# Patient Record
Sex: MALE | Race: WHITE | NOT HISPANIC OR LATINO | Employment: OTHER | URBAN - METROPOLITAN AREA
[De-identification: names, ages, dates, MRNs, and addresses within clinical notes are randomized per-mention and may not be internally consistent; named-entity substitution may affect disease eponyms.]

---

## 2017-01-12 ENCOUNTER — OFFICE VISIT (OUTPATIENT)
Dept: INTERNAL MEDICINE | Facility: CLINIC | Age: 82
End: 2017-01-12

## 2017-01-12 DIAGNOSIS — E78.00 PURE HYPERCHOLESTEROLEMIA: ICD-10-CM

## 2017-01-12 DIAGNOSIS — G47.00 PERSISTENT INSOMNIA: ICD-10-CM

## 2017-01-12 DIAGNOSIS — I10 ESSENTIAL HYPERTENSION: Primary | ICD-10-CM

## 2017-01-12 DIAGNOSIS — M79.10 MYALGIA: ICD-10-CM

## 2017-01-12 DIAGNOSIS — E66.9 ADIPOSITY: ICD-10-CM

## 2017-01-12 LAB
ALBUMIN SERPL-MCNC: 4.1 G/DL (ref 3.2–4.8)
ALBUMIN/GLOB SERPL: 1.6 G/DL (ref 1.5–2.5)
ALP SERPL-CCNC: 65 U/L (ref 25–100)
ALT SERPL W P-5'-P-CCNC: 26 U/L (ref 7–40)
ANION GAP SERPL CALCULATED.3IONS-SCNC: 11 MMOL/L (ref 3–11)
ARTICHOKE IGE QN: 56 MG/DL (ref 0–130)
AST SERPL-CCNC: 29 U/L (ref 0–33)
BASOPHILS # BLD AUTO: 0.02 10*3/MM3 (ref 0–0.2)
BASOPHILS NFR BLD AUTO: 0.3 % (ref 0–1)
BILIRUB SERPL-MCNC: 1 MG/DL (ref 0.3–1.2)
BUN BLD-MCNC: 15 MG/DL (ref 9–23)
BUN/CREAT SERPL: 13.6 (ref 7–25)
CALCIUM SPEC-SCNC: 9.7 MG/DL (ref 8.7–10.4)
CHLORIDE SERPL-SCNC: 106 MMOL/L (ref 99–109)
CHOLEST SERPL-MCNC: 132 MG/DL (ref 0–200)
CO2 SERPL-SCNC: 28 MMOL/L (ref 20–31)
CREAT BLD-MCNC: 1.1 MG/DL (ref 0.6–1.3)
CRP SERPL-MCNC: 5.5 MG/DL (ref 0–10)
DEPRECATED RDW RBC AUTO: 44.7 FL (ref 37–54)
EOSINOPHIL # BLD AUTO: 0.29 10*3/MM3 (ref 0.1–0.3)
EOSINOPHIL NFR BLD AUTO: 4.3 % (ref 0–3)
ERYTHROCYTE [DISTWIDTH] IN BLOOD BY AUTOMATED COUNT: 12.9 % (ref 11.3–14.5)
GFR SERPL CREATININE-BSD FRML MDRD: 65 ML/MIN/1.73
GLOBULIN UR ELPH-MCNC: 2.5 GM/DL
GLUCOSE BLD-MCNC: 112 MG/DL (ref 70–100)
HCT VFR BLD AUTO: 45.2 % (ref 38.9–50.9)
HDLC SERPL-MCNC: 43 MG/DL (ref 40–60)
HGB BLD-MCNC: 15.8 G/DL (ref 13.1–17.5)
IMM GRANULOCYTES # BLD: 0.04 10*3/MM3 (ref 0–0.03)
IMM GRANULOCYTES NFR BLD: 0.6 % (ref 0–0.6)
LYMPHOCYTES # BLD AUTO: 1.66 10*3/MM3 (ref 0.6–4.8)
LYMPHOCYTES NFR BLD AUTO: 24.6 % (ref 24–44)
MCH RBC QN AUTO: 33.1 PG (ref 27–31)
MCHC RBC AUTO-ENTMCNC: 35 G/DL (ref 32–36)
MCV RBC AUTO: 94.8 FL (ref 80–99)
MONOCYTES # BLD AUTO: 0.41 10*3/MM3 (ref 0–1)
MONOCYTES NFR BLD AUTO: 6.1 % (ref 0–12)
NEUTROPHILS # BLD AUTO: 4.33 10*3/MM3 (ref 1.5–8.3)
NEUTROPHILS NFR BLD AUTO: 64.1 % (ref 41–71)
PLATELET # BLD AUTO: 148 10*3/MM3 (ref 150–450)
PMV BLD AUTO: 10.8 FL (ref 6–12)
POTASSIUM BLD-SCNC: 4.5 MMOL/L (ref 3.5–5.5)
PROT SERPL-MCNC: 6.6 G/DL (ref 5.7–8.2)
RBC # BLD AUTO: 4.77 10*6/MM3 (ref 4.2–5.76)
SODIUM BLD-SCNC: 145 MMOL/L (ref 132–146)
TRIGL SERPL-MCNC: 117 MG/DL (ref 0–150)
WBC NRBC COR # BLD: 6.75 10*3/MM3 (ref 3.5–10.8)

## 2017-01-12 PROCEDURE — 85025 COMPLETE CBC W/AUTO DIFF WBC: CPT | Performed by: INTERNAL MEDICINE

## 2017-01-12 PROCEDURE — 80053 COMPREHEN METABOLIC PANEL: CPT | Performed by: INTERNAL MEDICINE

## 2017-01-12 PROCEDURE — 99214 OFFICE O/P EST MOD 30 MIN: CPT | Performed by: INTERNAL MEDICINE

## 2017-01-12 PROCEDURE — 86140 C-REACTIVE PROTEIN: CPT | Performed by: INTERNAL MEDICINE

## 2017-01-12 PROCEDURE — 80061 LIPID PANEL: CPT | Performed by: INTERNAL MEDICINE

## 2017-01-12 RX ORDER — MAGNESIUM HYDROXIDE/ALUMINUM HYDROXICE/SIMETHICONE 120; 1200; 1200 MG/30ML; MG/30ML; MG/30ML
15 SUSPENSION ORAL DAILY PRN
COMMUNITY
End: 2017-06-08 | Stop reason: HOSPADM

## 2017-01-12 RX ORDER — CLOPIDOGREL BISULFATE 75 MG/1
75 TABLET ORAL DAILY
Qty: 90 TABLET | Refills: 1 | Status: SHIPPED | OUTPATIENT
Start: 2017-01-12 | End: 2017-11-11 | Stop reason: SDUPTHER

## 2017-01-12 NOTE — MR AVS SNAPSHOT
Iván Host   1/12/2017 7:45 AM   Office Visit    Provider:  Charles Scott MD   Department:  [facility] INTERNAL MEDICINE   Dept Phone:  [phone]                Your Full Care Plan              Today's Medication Changes          These changes are accurate as of: 1/12/17  8:20 AM.  If you have any questions, ask your nurse or doctor.               New Medication(s)Ordered:     Phentermine-Topiramate 3.75-23 MG capsule sustained-release 24 hr   Take 1 capsule by mouth Every Morning.         Medication(s)that have changed:     clopidogrel 75 MG tablet   Commonly known as:  PLAVIX   Take 1 tablet by mouth Daily.   What changed:  See the new instructions.            Where to Get Your Medications      These medications were sent to [facility]/pharmacy #[id number] - [place], [place] - [address] - [phone]  - [phone]   [address] [id number]    Hours:  24-hours Phone:  [phone]     clopidogrel 75 MG tablet         You can get these medications from any pharmacy     Bring a paper prescription for each of these medications     Phentermine-Topiramate 3.75-23 MG capsule sustained-release 24 hr                  Your Updated Medication List          This list is accurate as of: 1/12/17  8:20 AM.  Always use your most recent med list.                ADVIL 200 MG capsule   Generic drug:  Ibuprofen       aluminum-magnesium hydroxide-simethicone 200-200-20 MG/5ML suspension   Commonly known as:  MAALOX/MYLANTA       aspirin 81 MG chewable tablet   Chew 1 tablet Daily.       atenolol 50 MG tablet   Commonly known as:  TENORMIN   TAKE 1 TABLET DAILY.       bismuth subsalicylate 262 MG/15ML suspension   Commonly known as:  PEPTO BISMOL       CENTRUM ADULTS tablet       clopidogrel 75 MG tablet   Commonly known as:  PLAVIX   Take 1 tablet by mouth Daily.       Co Q-10 200 MG capsule       Glucosamine 500 MG capsule       oxybutynin XL 5 MG 24 hr tablet      Commonly known as:  DITROPAN-XL   TAKE 1 TABLET BY MOUTH EVERY 12 (TWELVE) HOURS.       Phentermine-Topiramate 3.75-23 MG capsule sustained-release 24 hr   Take 1 capsule by mouth Every Morning.       raNITIdine 300 MG tablet   Commonly known as:  ZANTAC   Take 1 tablet by mouth every night.       rosuvastatin 10 MG tablet   Commonly known as:  CRESTOR   Take 1 tablet by mouth Every Night.       tamsulosin 0.4 MG capsule 24 hr capsule   Commonly known as:  FLOMAX   TAKE 1 CAPSULE EVERY 12 HOURS       triamcinolone 0.1 % cream   Commonly known as:  KENALOG       VIAGRA 100 MG tablet   Generic drug:  sildenafil       Vitamin C 500 MG capsule       Vitamin D (Cholecalciferol) 1000 UNITS capsule       zaleplon 10 MG capsule   Commonly known as:  SONATA               We Performed the Following     C-reactive Protein     CBC & Differential     Comprehensive Metabolic Panel     Lipid Panel       You Were Diagnosed With        Codes Comments    Essential hypertension    -  Primary ICD-10-CM: I10  ICD-9-CM: 401.9     Adiposity     ICD-10-CM: E66.9  ICD-9-CM: 278.02     Pure hypercholesterolemia     ICD-10-CM: E78.00  ICD-9-CM: 272.0     Myalgia     ICD-10-CM: M79.1  ICD-9-CM: 729.1       Instructions    1.  Start Qsymia 1 capsule every morning - call the office  February 1 with status.    2.  Plan on increased dose of medicine - next month.    3.  Plan on three-day bowel prep for colonoscopy - in March.    4.  Continue usual medicines and supplements - as listed.    5.  Use Pepto-Bismol or Mylanta - for indigestion.    6.  Maintain a routine physical fitness program - every week.    7.  Follow a low-calorie diet - low in salt and low in sugar - for steady weight loss.    8.  Goal weight by December - 235 pounds.    9.  Return in April - fasting checkup.     Patient Instructions History      [facility] Sign[facility]     [facility] allows you to send messages to your doctor, view your test results, renew your  prescriptions, schedule appointments, and more. To sign up, go to [employer] and click on the Sign Up Now link in the New User? box. Enter your [employer] Activation Code exactly as it appears below along with the last four digits of your Social Security Number and your Date of Birth () to complete the sign-up process. If you do not sign up before the expiration date, you must request a new code.    [employer] Activation Code: [email or web address]  Expires: 2017  8:20 AM    If you have questions, you can email [email or web address] or call [phone] to talk to our [employer] staff. Remember, [employer] is NOT to be used for urgent needs. For medical emergencies, dial 911.               Other Info from Your Visit           Your Appointments     Mar 20, 2017  7:00 AM EDT   Outside Facility with Ryan Torres MD   [facility] GASTROENTEROLOGY (--)    [address]  [facility] [address]   [phone]            2017  7:45 AM EDT   Follow Up with Charles Scott MD   [facility] INTERNAL MEDICINE (--)    [address]  [facility] [address]   [phone]           Arrive 15 minutes prior to appointment.              Allergies     Atorvastatin Intolerance     Fatigue    Topiramate Intolerance     Leg cramps      Vital Signs     Smoking Status                   Never Smoker           Problems and Diagnoses Noted     Adiposity    High cholesterol or triglycerides    High blood pressure    Myalgia

## 2017-01-12 NOTE — PATIENT INSTRUCTIONS
1.  Start Qsymia 1 capsule every morning - call the office  February 1 with status.    2.  Plan on increased dose of medicine - next month.    3.  Plan on three-day bowel prep for colonoscopy - in March.    4.  Continue usual medicines and supplements - as listed.    5.  Use Pepto-Bismol or Mylanta - for indigestion.    6.  Maintain a routine physical fitness program - every week.    7.  Follow a low-calorie diet - low in salt and low in sugar - for steady weight loss.    8.  Goal weight by December - 235 pounds.    9.  Return in April - fasting checkup.

## 2017-01-13 VITALS
DIASTOLIC BLOOD PRESSURE: 80 MMHG | SYSTOLIC BLOOD PRESSURE: 130 MMHG | HEART RATE: 70 BPM | RESPIRATION RATE: 14 BRPM | WEIGHT: 247 LBS | TEMPERATURE: 97.8 F | OXYGEN SATURATION: 96 % | BODY MASS INDEX: 32.59 KG/M2

## 2017-01-13 RX ORDER — ROSUVASTATIN CALCIUM 10 MG/1
10 TABLET, COATED ORAL EVERY OTHER DAY
Qty: 90 TABLET | Refills: 1 | Status: SHIPPED | OUTPATIENT
Start: 2017-01-13 | End: 2017-06-08 | Stop reason: ALTCHOICE

## 2017-01-13 NOTE — PROGRESS NOTES
Siria Lara is a 81 y.o. male.     History of Present Illness     The patient is now 2 months status post TIA and brief hospitalization.  He has had no more neurologic events.  He has had no palpitations or chest pains.  He continues on aspirin and Plavix for secondary prevention.  He does have a remote history of a TIA several years ago as well as hypertension and hyperlipidemia.  He has previously failed atorvastatin but is now tolerating Crestor for the last several weeks.  He is back to her regular exercise program and  has never smoked.    The following portions of the patient's history were reviewed and updated as appropriate: allergies, current medications, past family history, past medical history, past social history, past surgical history and problem list.    Review of Systems   Constitutional: Negative for appetite change and fatigue.   HENT: Negative for ear pain and sore throat.    Respiratory: Negative for cough and shortness of breath.    Cardiovascular: Negative for chest pain and palpitations.   Gastrointestinal: Negative for abdominal pain and nausea.   Genitourinary: Positive for frequency and urgency.        Overactive bladder responds well to oxybutynin   Musculoskeletal: Positive for arthralgias. Negative for back pain and gait problem.   Neurological: Negative for dizziness and headaches.   Psychiatric/Behavioral: Negative for sleep disturbance. The patient is not nervous/anxious.         Sleep quality is good with medication ×2 months.         Objective   There were no vitals taken for this visit.    Physical Exam   Constitutional: He is oriented to person, place, and time. He appears well-developed and well-nourished. No distress.   Cardiovascular: Normal rate, regular rhythm and normal heart sounds.    Pulmonary/Chest: Effort normal and breath sounds normal. He has no wheezes. He has no rales.   Abdominal: Soft. Bowel sounds are normal. He exhibits no distension. There is no  tenderness.   Obese   Musculoskeletal: Normal range of motion. He exhibits no edema.   Neurological: He is alert and oriented to person, place, and time. He exhibits normal muscle tone. Coordination normal.   Psychiatric: He has a normal mood and affect. His behavior is normal. Judgment and thought content normal.   Nursing note and vitals reviewed.    Procedures  Assessment/Plan   Diagnoses and all orders for this visit:    Essential hypertension    Adiposity    Pure hypercholesterolemia  -     Comprehensive Metabolic Panel  -     Lipid Panel    Myalgia  -     CBC & Differential  -     C-reactive Protein  -     CK; Future  -     CBC Auto Differential    Other orders  -     Phentermine-Topiramate 3.75-23 MG capsule sustained-release 24 hr; Take 1 capsule by mouth Every Morning.  -     clopidogrel (PLAVIX) 75 MG tablet; Take 1 tablet by mouth Daily.      The patient is neurologically doing well.  I've asked him to continue on aspirin and Plavix for secondary prevention.  He must continue working on his risk factors to optimize his chances for preventing future events.    The patient's cholesterol level is quite low with an LDL of 50.  He will likely do well on Crestor every 48 hours and will also likely have less risk of failure on this medication.    The patient's obesity remains a significant risk factor.  He has made no progress in the last few years with standard care.  I've counseled him on the use of medications for appetite suppression and is agreeable.    The patient's blood pressure is in good control.  He should continue salt restriction and atenolol for a goal blood pressure of 130/80.    The patient has a colonoscopy scheduled for March.  Because of his advanced age and fragile health, I've asked him to use a three-day bowel prep to minimize chances of excessive stress and dehydration.  This will be his last screening colonoscopy.    Patient Instructions   1.  Start Qsymia 1 capsule every morning - call  the office  February 1 with status.    2.  Plan on increased dose of medicine - next month.    3.  Plan on three-day bowel prep for colonoscopy - in March.    4.  Continue usual medicines and supplements - as listed.    5.  Use Pepto-Bismol or Mylanta - for indigestion.    6.  Maintain a routine physical fitness program - every week.    7.  Follow a low-calorie diet - low in salt and low in sugar - for steady weight loss.    8.  Goal weight by December - 235 pounds.    9.  Return in April - fasting checkup.    10.  Change Crestor 10 mg even days only for long-term safety.  Repeat CMP and lipid panel in 5 weeks.    11.  Other laboratory tests are acceptable and required no change in treatment.    12.  Patient counseled on the above by telephone Friday, January 13 and understands the new plan.

## 2017-01-17 DIAGNOSIS — E66.9 ADIPOSITY: Primary | ICD-10-CM

## 2017-02-20 ENCOUNTER — OFFICE VISIT (OUTPATIENT)
Dept: INTERNAL MEDICINE | Facility: CLINIC | Age: 82
End: 2017-02-20

## 2017-02-20 DIAGNOSIS — I10 ESSENTIAL HYPERTENSION: ICD-10-CM

## 2017-02-20 DIAGNOSIS — R19.7 DIARRHEA OF PRESUMED INFECTIOUS ORIGIN: ICD-10-CM

## 2017-02-20 DIAGNOSIS — E66.9 ADIPOSITY: Primary | ICD-10-CM

## 2017-02-20 DIAGNOSIS — E78.00 PURE HYPERCHOLESTEROLEMIA: ICD-10-CM

## 2017-02-20 PROCEDURE — 99213 OFFICE O/P EST LOW 20 MIN: CPT | Performed by: INTERNAL MEDICINE

## 2017-02-20 NOTE — PATIENT INSTRUCTIONS
1.  Use Pepto-Bismol and Imodium as needed - to prevent diarrhea.    2.  Drink Powerade ZERO - 1 bottle daily to prevent dehydration - as needed.    3.  Change Crestor - every other day - even days only.    4.  Continue usual medicines and supplements - as listed.    5.  Return April 11 - fasting checkup.    6.  Bring in stool specimens - Friday or next Monday - as needed - if diarrhea does not resolve.    7.  Continue off of Qsymia because of memory impairment.

## 2017-02-20 NOTE — PROGRESS NOTES
Siria Lara is a 81 y.o. male.     History of Present Illness     The patient's had 3 days of watery diarrhea.  He has had no nausea vomiting or abdominal cramps.  He has had no fevers chills or sweats.  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  He did travel to [place] for several days returning on Sunday February 12.    The patient's had many years of mild obesity.  He was started on Qsymia once month ago for weight loss and took it for several days.  He developed a memory difficulty when speaking to his wife and talking on the phone.  He stopped the medication 3 weeks ago and feels that he was much better within 48 hours.  He still attends many business meetings.  He has continued to take Crestor every day even though he was cold a month ago to change it to 48 hours.  He just forgot to put down the note.    The following portions of the patient's history were reviewed and updated as appropriate: allergies, current medications, past family history, past medical history, past social history, past surgical history and problem list.    Review of Systems   Constitutional: Negative for appetite change and fatigue.   HENT: Negative for congestion and sore throat.    Respiratory: Negative for cough and shortness of breath.    Cardiovascular: Negative for chest pain and palpitations.   Gastrointestinal: Positive for diarrhea. Negative for abdominal distention, blood in stool and nausea.   Neurological: Negative for dizziness and light-headedness.   Psychiatric/Behavioral: Positive for decreased concentration. Negative for sleep disturbance. The patient is not nervous/anxious.        Objective   There were no vitals taken for this visit.    Physical Exam   Constitutional: He is oriented to person, place, and time. He appears well-developed and well-nourished. No distress.   HENT:   Right Ear:  External ear normal.   Left Ear: External ear normal.   Mouth/Throat: Oropharynx is clear and moist.   Eyes: EOM are normal. Pupils are equal, round, and reactive to light. No scleral icterus.   Neck: Normal range of motion. Neck supple. No JVD present.   Cardiovascular: Normal rate, regular rhythm and normal heart sounds.    Pulmonary/Chest: Effort normal and breath sounds normal. He has no wheezes. He has no rales.   Abdominal: Soft. Bowel sounds are normal. He exhibits no distension and no mass. There is no tenderness.   Lymphadenopathy:     He has no cervical adenopathy.   Neurological: He is alert and oriented to person, place, and time. He exhibits normal muscle tone. Coordination normal.   Psychiatric: He has a normal mood and affect. His behavior is normal. Judgment and thought content normal.   Nursing note and vitals reviewed.    Procedures  Assessment/Plan   Diagnoses and all orders for this visit:    Adiposity    Essential hypertension    Pure hypercholesterolemia    Diarrhea of presumed infectious origin      The patient has 3 days of diarrheal illness probably infectious in origin.  He has lost 7 pounds last month and have counseled him on rehydration.  I've asked him to treat this symptomatically and return for further studies if it is not resolved in one week.    Patient has apparently had an adverse drug reaction to a weight loss medication.  He also experienced a TIA in November which may still have unresolved impairment.     The patient has hyperlipidemia and has had a TIA.  He is previously failed Lipitor.  His LDL cholesterol was 50 on Crestor.  I've asked him to change Crestor to 48 hours schedule since it may work just as well and will have much less chance of adverse reaction.    Patient Instructions   1.  Use Pepto-Bismol and Imodium as needed - to prevent diarrhea.    2.  Drink Powerade ZERO - 1 bottle daily to prevent dehydration - as needed.    3.  Change Crestor - every other day - even  days only.    4.  Continue usual medicines and supplements - as listed.    5.  Return April 11 - fasting checkup.    6.  Bring in stool specimens - Friday or next Monday - as needed - if diarrhea does not resolve.    7.  Continue off of Qsymia because of memory impairment.    Electronically signed Charles Scott M.D.2/20/2017 8:22 AM

## 2017-03-08 ENCOUNTER — LAB REQUISITION (OUTPATIENT)
Dept: LAB | Facility: HOSPITAL | Age: 82
End: 2017-03-08

## 2017-03-08 ENCOUNTER — APPOINTMENT (OUTPATIENT)
Dept: LAB | Facility: HOSPITAL | Age: 82
End: 2017-03-08

## 2017-03-08 DIAGNOSIS — R19.7 DIARRHEA: ICD-10-CM

## 2017-03-08 DIAGNOSIS — R19.7 WATERY DIARRHEA: Primary | ICD-10-CM

## 2017-03-08 LAB — C DIFF TOX GENS STL QL NAA+PROBE: NOT DETECTED

## 2017-03-08 PROCEDURE — 87046 STOOL CULTR AEROBIC BACT EA: CPT | Performed by: INTERNAL MEDICINE

## 2017-03-08 PROCEDURE — 87209 SMEAR COMPLEX STAIN: CPT | Performed by: INTERNAL MEDICINE

## 2017-03-08 PROCEDURE — 87177 OVA AND PARASITES SMEARS: CPT | Performed by: INTERNAL MEDICINE

## 2017-03-08 PROCEDURE — 87493 C DIFF AMPLIFIED PROBE: CPT | Performed by: INTERNAL MEDICINE

## 2017-03-08 PROCEDURE — 87328 CRYPTOSPORIDIUM AG IA: CPT | Performed by: INTERNAL MEDICINE

## 2017-03-08 PROCEDURE — 87329 GIARDIA AG IA: CPT | Performed by: INTERNAL MEDICINE

## 2017-03-08 PROCEDURE — 87045 FECES CULTURE AEROBIC BACT: CPT | Performed by: INTERNAL MEDICINE

## 2017-03-09 RX ORDER — ATENOLOL 50 MG/1
TABLET ORAL
Qty: 90 TABLET | Refills: 1 | Status: SHIPPED | OUTPATIENT
Start: 2017-03-09 | End: 2017-10-02 | Stop reason: SDUPTHER

## 2017-03-10 LAB
BACTERIA SPEC AEROBE CULT: NORMAL
CRYPTOSP AG STL QL IA: NEGATIVE
G LAMBLIA AG STL QL IA: NEGATIVE

## 2017-03-12 LAB
O+P SPEC MICRO: NORMAL
O+P STL TRI STN: NORMAL

## 2017-03-14 ENCOUNTER — TELEPHONE (OUTPATIENT)
Dept: GASTROENTEROLOGY | Facility: CLINIC | Age: 82
End: 2017-03-14

## 2017-03-14 ENCOUNTER — TELEPHONE (OUTPATIENT)
Dept: INTERNAL MEDICINE | Facility: CLINIC | Age: 82
End: 2017-03-14

## 2017-03-14 NOTE — TELEPHONE ENCOUNTER
Msg left requesting pt to call back to let us know how his diarrhea is doing per TGF. If still a problem he would need to come in, may come in some day this week per TGF.
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
## 2017-03-16 ENCOUNTER — OFFICE VISIT (OUTPATIENT)
Dept: INTERNAL MEDICINE | Facility: CLINIC | Age: 82
End: 2017-03-16

## 2017-03-16 VITALS
SYSTOLIC BLOOD PRESSURE: 150 MMHG | TEMPERATURE: 97.8 F | BODY MASS INDEX: 32.06 KG/M2 | WEIGHT: 243 LBS | OXYGEN SATURATION: 97 % | HEART RATE: 72 BPM | DIASTOLIC BLOOD PRESSURE: 100 MMHG | RESPIRATION RATE: 16 BRPM

## 2017-03-16 DIAGNOSIS — R19.7 DIARRHEA OF PRESUMED INFECTIOUS ORIGIN: Primary | ICD-10-CM

## 2017-03-16 DIAGNOSIS — K21.9 GASTROESOPHAGEAL REFLUX DISEASE WITHOUT ESOPHAGITIS: ICD-10-CM

## 2017-03-16 DIAGNOSIS — I10 ESSENTIAL HYPERTENSION: ICD-10-CM

## 2017-03-16 PROCEDURE — 99213 OFFICE O/P EST LOW 20 MIN: CPT | Performed by: INTERNAL MEDICINE

## 2017-03-16 RX ORDER — LOPERAMIDE HYDROCHLORIDE 2 MG/1
2 CAPSULE ORAL 2 TIMES DAILY PRN
COMMUNITY
End: 2017-03-18

## 2017-03-16 NOTE — PROGRESS NOTES
Subjective   Iván Lara is a 81 y.o. male.     History of Present Illness     The patient presented February 20 with a 3 day history of diarrhea.  He was asked to return with stool specimens if his diarrhea continued over the next week.  About one week ago he visited a gastroenterologist the patient's had continued diarrhea and fact has had difficulty controlling his bowels.  He has not been using I Imodium prior to activities.  He has never had a severe case of diarrhea in past years.    The following portions of the patient's history were reviewed and updated as appropriate: allergies, current medications, past family history, past medical history, past social history, past surgical history and problem list.    Review of Systems   Constitutional: Negative for appetite change, chills, fatigue and fever.   Cardiovascular: Negative for chest pain and palpitations.   Gastrointestinal: Positive for diarrhea. Negative for abdominal distention, anal bleeding, nausea and vomiting.   Genitourinary: Positive for frequency and urgency.   Neurological: Negative for dizziness and light-headedness.       Objective   Blood pressure 150/100, pulse 72, temperature 97.8 °F (36.6 °C), temperature source Oral, resp. rate 16, weight 243 lb (110 kg), SpO2 97 %.    Physical Exam   Constitutional: He appears well-developed and well-nourished. No distress.   Cardiovascular: Normal rate, regular rhythm and normal heart sounds.    Pulmonary/Chest: Effort normal and breath sounds normal. He has no wheezes. He has no rales.   Abdominal: Soft. He exhibits no distension. There is no tenderness.   Bowel sounds are hyperreactive   Psychiatric: He has a normal mood and affect. His behavior is normal. Judgment and thought content normal.   Nursing note and vitals reviewed.    Procedures  Assessment/Plan   Iván was seen today for diarrhea.    Diagnoses and all orders for this visit:    Diarrhea of presumed infectious origin    Essential  hypertension    Gastroesophageal reflux disease without esophagitis      The patient's diarrhea is certainly refractory now over 3 weeks.  Stool specimens have been negative for pathogens.  He seems to have either a disturbance of his bowel ludivina was possibly developed microscopic colitis.  I've encouraged her to follow through with his colonoscopy on Monday, March 20.    The patient's diarrhea has progressed to the point of difficulty with continence.  I've counseled him on using Lomotil prior to travel and eating only small amounts.  He may wish to wear special pads in his previous to protect against episodes of incontinence.    The patient's chronic GERD could certainly be a factor in the potential for diarrhea.  He should avoid PPIs because of risks for increasing bowel motility.  He should continue on ranitidine and use Pepto-Bismol as needed.    Patient Instructions   1.  Use Lomotil 2 tablets twice daily as needed - to prevent diarrhea.    2.  Prepare bowel - follow through with colonoscopy - next week.    3.  Always eat small amounts of food - to avoid bowel stimulation.    4.  Get adequate rest at night - to avoid bowel distress.    5.  Return April 11 - fasting checkup.      Electronically signed Charles Scott M.D.3/18/2017 5:24 PM

## 2017-03-16 NOTE — PATIENT INSTRUCTIONS
1.  Use Lomotil 2 tablets twice daily as needed - to prevent diarrhea.    2.  Prepare bowel - follow through with colonoscopy - next week.    3.  Always eat small amounts of food - to avoid bowel stimulation.    4.  Get adequate rest at night - to avoid bowel distress.    5.  Return April 11 - fasting checkup.

## 2017-03-18 RX ORDER — DIPHENOXYLATE HYDROCHLORIDE AND ATROPINE SULFATE 2.5; .025 MG/1; MG/1
2 TABLET ORAL 2 TIMES DAILY PRN
COMMUNITY
End: 2017-06-08 | Stop reason: ALTCHOICE

## 2017-03-20 ENCOUNTER — OUTSIDE FACILITY SERVICE (OUTPATIENT)
Dept: GASTROENTEROLOGY | Facility: CLINIC | Age: 82
End: 2017-03-20

## 2017-03-20 ENCOUNTER — LAB REQUISITION (OUTPATIENT)
Dept: LAB | Facility: HOSPITAL | Age: 82
End: 2017-03-20

## 2017-03-20 DIAGNOSIS — R19.7 DIARRHEA: ICD-10-CM

## 2017-03-20 DIAGNOSIS — Z86.010 HISTORY OF COLONIC POLYPS: ICD-10-CM

## 2017-03-20 PROCEDURE — 88305 TISSUE EXAM BY PATHOLOGIST: CPT | Performed by: INTERNAL MEDICINE

## 2017-03-20 PROCEDURE — 45380 COLONOSCOPY AND BIOPSY: CPT | Performed by: INTERNAL MEDICINE

## 2017-03-21 LAB
CYTO UR: NORMAL
LAB AP CASE REPORT: NORMAL
LAB AP CLINICAL INFORMATION: NORMAL
Lab: NORMAL
PATH REPORT.FINAL DX SPEC: NORMAL
PATH REPORT.GROSS SPEC: NORMAL

## 2017-03-23 ENCOUNTER — DOCUMENTATION (OUTPATIENT)
Dept: INTERNAL MEDICINE | Facility: CLINIC | Age: 82
End: 2017-03-23

## 2017-03-23 NOTE — PROGRESS NOTES
Patient notified of colon biopsy report.  He has already talked to the gastroenterologist.  He has started on Entocort capsules 3 daily and already feels better after 1 day.  Return to the office in 3 weeks.

## 2017-03-24 ENCOUNTER — TELEPHONE (OUTPATIENT)
Dept: INTERNAL MEDICINE | Facility: CLINIC | Age: 82
End: 2017-03-24

## 2017-03-24 NOTE — TELEPHONE ENCOUNTER
----- Message from Gaurav Wolf sent at 3/24/2017 10:35 AM EDT -----  Contact: TIM GEIGER RENEWAL:  [facility]IS PHARM[facility] SAID TGF NEEDS TO APPROVE HIS ATENOLOL FOR RENEWAL.  [facility].  [phone]

## 2017-04-11 ENCOUNTER — OFFICE VISIT (OUTPATIENT)
Dept: INTERNAL MEDICINE | Facility: CLINIC | Age: 82
End: 2017-04-11

## 2017-04-11 DIAGNOSIS — K21.9 GASTROESOPHAGEAL REFLUX DISEASE WITHOUT ESOPHAGITIS: ICD-10-CM

## 2017-04-11 DIAGNOSIS — J06.9 ACUTE URI: Primary | ICD-10-CM

## 2017-04-11 DIAGNOSIS — K52.832 LYMPHOCYTIC COLITIS: ICD-10-CM

## 2017-04-11 DIAGNOSIS — E78.00 PURE HYPERCHOLESTEROLEMIA: ICD-10-CM

## 2017-04-11 DIAGNOSIS — I10 ESSENTIAL HYPERTENSION: ICD-10-CM

## 2017-04-11 PROBLEM — R19.7 DIARRHEA: Status: RESOLVED | Noted: 2017-02-20 | Resolved: 2017-04-11

## 2017-04-11 LAB
ALBUMIN SERPL-MCNC: 4.1 G/DL (ref 3.2–4.8)
ALBUMIN/GLOB SERPL: 1.6 G/DL (ref 1.5–2.5)
ALP SERPL-CCNC: 66 U/L (ref 25–100)
ALT SERPL W P-5'-P-CCNC: 21 U/L (ref 7–40)
ANION GAP SERPL CALCULATED.3IONS-SCNC: 0 MMOL/L (ref 3–11)
ARTICHOKE IGE QN: 78 MG/DL (ref 0–130)
AST SERPL-CCNC: 19 U/L (ref 0–33)
BASOPHILS # BLD AUTO: 0.04 10*3/MM3 (ref 0–0.2)
BASOPHILS NFR BLD AUTO: 0.4 % (ref 0–1)
BILIRUB SERPL-MCNC: 1.2 MG/DL (ref 0.3–1.2)
BUN BLD-MCNC: 19 MG/DL (ref 9–23)
BUN/CREAT SERPL: 15.8 (ref 7–25)
CALCIUM SPEC-SCNC: 9.5 MG/DL (ref 8.7–10.4)
CHLORIDE SERPL-SCNC: 106 MMOL/L (ref 99–109)
CHOLEST SERPL-MCNC: 168 MG/DL (ref 0–200)
CO2 SERPL-SCNC: 33 MMOL/L (ref 20–31)
CREAT BLD-MCNC: 1.2 MG/DL (ref 0.6–1.3)
CRP SERPL-MCNC: 0.28 MG/DL (ref 0–1)
DEPRECATED RDW RBC AUTO: 49.4 FL (ref 37–54)
EOSINOPHIL # BLD AUTO: 0.35 10*3/MM3 (ref 0.1–0.3)
EOSINOPHIL NFR BLD AUTO: 3.9 % (ref 0–3)
ERYTHROCYTE [DISTWIDTH] IN BLOOD BY AUTOMATED COUNT: 13.8 % (ref 11.3–14.5)
GFR SERPL CREATININE-BSD FRML MDRD: 58 ML/MIN/1.73
GLOBULIN UR ELPH-MCNC: 2.5 GM/DL
GLUCOSE BLD-MCNC: 106 MG/DL (ref 70–100)
HCT VFR BLD AUTO: 46.5 % (ref 38.9–50.9)
HDLC SERPL-MCNC: 55 MG/DL (ref 40–60)
HGB BLD-MCNC: 15.2 G/DL (ref 13.1–17.5)
IMM GRANULOCYTES # BLD: 0.04 10*3/MM3 (ref 0–0.03)
IMM GRANULOCYTES NFR BLD: 0.4 % (ref 0–0.6)
LYMPHOCYTES # BLD AUTO: 1.47 10*3/MM3 (ref 0.6–4.8)
LYMPHOCYTES NFR BLD AUTO: 16.4 % (ref 24–44)
MCH RBC QN AUTO: 32.2 PG (ref 27–31)
MCHC RBC AUTO-ENTMCNC: 32.7 G/DL (ref 32–36)
MCV RBC AUTO: 98.5 FL (ref 80–99)
MONOCYTES # BLD AUTO: 0.8 10*3/MM3 (ref 0–1)
MONOCYTES NFR BLD AUTO: 8.9 % (ref 0–12)
NEUTROPHILS # BLD AUTO: 6.25 10*3/MM3 (ref 1.5–8.3)
NEUTROPHILS NFR BLD AUTO: 70 % (ref 41–71)
PLATELET # BLD AUTO: 129 10*3/MM3 (ref 150–450)
PMV BLD AUTO: 11 FL (ref 6–12)
POTASSIUM BLD-SCNC: 5.4 MMOL/L (ref 3.5–5.5)
PROT SERPL-MCNC: 6.6 G/DL (ref 5.7–8.2)
RBC # BLD AUTO: 4.72 10*6/MM3 (ref 4.2–5.76)
SODIUM BLD-SCNC: 139 MMOL/L (ref 132–146)
TRIGL SERPL-MCNC: 120 MG/DL (ref 0–150)
WBC NRBC COR # BLD: 8.95 10*3/MM3 (ref 3.5–10.8)

## 2017-04-11 PROCEDURE — 99213 OFFICE O/P EST LOW 20 MIN: CPT | Performed by: INTERNAL MEDICINE

## 2017-04-11 PROCEDURE — G0438 PPPS, INITIAL VISIT: HCPCS | Performed by: INTERNAL MEDICINE

## 2017-04-11 PROCEDURE — 80061 LIPID PANEL: CPT | Performed by: INTERNAL MEDICINE

## 2017-04-11 PROCEDURE — 86140 C-REACTIVE PROTEIN: CPT | Performed by: INTERNAL MEDICINE

## 2017-04-11 PROCEDURE — 80053 COMPREHEN METABOLIC PANEL: CPT | Performed by: INTERNAL MEDICINE

## 2017-04-11 PROCEDURE — 85025 COMPLETE CBC W/AUTO DIFF WBC: CPT | Performed by: INTERNAL MEDICINE

## 2017-04-11 PROCEDURE — 36415 COLL VENOUS BLD VENIPUNCTURE: CPT | Performed by: INTERNAL MEDICINE

## 2017-04-11 RX ORDER — BUDESONIDE 3 MG/1
1 CAPSULE, COATED PELLETS ORAL DAILY
Refills: 11 | COMMUNITY
Start: 2017-03-20 | End: 2020-07-17

## 2017-04-11 RX ORDER — OXYBUTYNIN CHLORIDE 5 MG/1
5 TABLET, EXTENDED RELEASE ORAL EVERY 12 HOURS
Qty: 180 TABLET | Refills: 1 | Status: SHIPPED | OUTPATIENT
Start: 2017-04-11

## 2017-04-11 NOTE — PROGRESS NOTES
QUICK REFERENCE INFORMATION:  The ABCs of the Annual Wellness Visit    Initial Medicare Wellness Visit    HEALTH RISK ASSESSMENT    1935    Recent Hospitalizations:  No recent hospitalization(s)..        Current Medical Providers:  Patient Care Team:  Charles Scott MD as PCP - General  Charles Scott MD as PCP - Family Medicine  Charles Scott MD as PCP - Claims Attributed - PLEASE DO NOT REMOVE  Charles Scott MD as MSSP ACO Attributed - PLEASE DO NOT REMOVE        Smoking Status:  History   Smoking Status   • Never Smoker   Smokeless Tobacco   • Never Used       Alcohol Consumption:  History   Alcohol Use   • 4.2 - 6.0 oz/week   • 7 - 10 Glasses of wine per week       Depression Screen:   PHQ-9 Depression Screening 10/7/2016   Little interest or pleasure in doing things 0   Feeling down, depressed, or hopeless 0   Trouble falling or staying asleep, or sleeping too much 0   Feeling tired or having little energy 0   Poor appetite or overeating 0   Feeling bad about yourself - or that you are a failure or have let yourself or your family down 0   Trouble concentrating on things, such as reading the newspaper or watching television 0   Moving or speaking so slowly that other people could have noticed. Or the opposite - being so fidgety or restless that you have been moving around a lot more than usual 0   Thoughts that you would be better off dead, or of hurting yourself in some way 0   PHQ-9 Total Score 0   If you checked off any problems, how difficult have these problems made it for you to do your work, take care of things at home, or get along with other people? Not difficult at all       Health Habits and Functional and Cognitive Screening:  No flowsheet data found.               Does the patient have evidence of cognitive impairment? No    Asprin use counseling:yes      Recent Lab Results:    Visual Acuity:  No exam data present    Age-appropriate Screening Schedule:  Refer to the list  below for future screening recommendations based on patient's age, sex and/or medical conditions. Orders for these recommended tests are listed in the plan section. The patient has been provided with a written plan.    Health Maintenance   Topic Date Due   • PNEUMOCOCCAL VACCINES (65+ LOW/MEDIUM RISK) (2 of 2 - PPSV23) 01/01/2009   • ZOSTER VACCINE  04/28/2016   • LIPID PANEL  01/12/2018   • TDAP/TD VACCINES (2 - Td) 01/01/2020   • INFLUENZA VACCINE  Completed        Subjective   History of Present Illness    Alin Lara is a 81 y.o. male who presents for an Annual Wellness Visit.    The following portions of the patient's history were reviewed and updated as appropriate: allergies and current medications.    Outpatient Medications Prior to Visit   Medication Sig Dispense Refill   • aluminum-magnesium hydroxide-simethicone (MAALOX/MYLANTA) 200-200-20 MG/5ML suspension Take 15 mL by mouth Daily As Needed.     • Ascorbic Acid (VITAMIN C) 500 MG capsule Take 1 capsule by mouth daily.     • aspirin 81 MG chewable tablet Chew 1 tablet Daily.     • atenolol (TENORMIN) 50 MG tablet TAKE 1 TABLET DAILY. 90 tablet 1   • bismuth subsalicylate (PEPTO BISMOL) 262 MG/15ML suspension Take 15 mL by mouth Daily As Needed. indigestion     • clopidogrel (PLAVIX) 75 MG tablet Take 1 tablet by mouth Daily. 90 tablet 1   • Coenzyme Q10 (CO Q-10) 200 MG capsule Take 1 capsule by mouth 3 (Three) Times a Day With Meals.     • diphenoxylate-atropine (LOMOTIL) 2.5-0.025 MG per tablet Take 2 tablets by mouth 2 (Two) Times a Day As Needed.     • Glucosamine 500 MG capsule Take 1 capsule by mouth daily.     • Ibuprofen (ADVIL) 200 MG capsule Take 2 capsules by mouth Daily As Needed. pain     • Multiple Vitamins-Minerals (CENTRUM ADULTS) tablet Take 1-2 tablets by mouth daily.     • ranitidine (ZANTAC) 300 MG tablet Take 1 tablet by mouth every night. 90 tablet 1   • rosuvastatin (CRESTOR) 10 MG tablet Take 1 tablet by mouth Every Other  Day. Even days only 90 tablet 1   • sildenafil (VIAGRA) 100 MG tablet Take 0.5-1 tablets by mouth as needed.     • tamsulosin (FLOMAX) 0.4 MG capsule 24 hr capsule TAKE 1 CAPSULE EVERY 12 HOURS 180 capsule 1   • triamcinolone (KENALOG) 0.1 % cream Apply  topically. Apply to itchy rash twice daily as needed     • Vitamin D, Cholecalciferol, 1000 UNITS capsule Take 3 capsules by mouth daily.     • zaleplon (SONATA) 10 MG capsule Take 0.5-1 capsules by mouth at night as needed.     • oxybutynin XL (DITROPAN-XL) 5 MG 24 hr tablet TAKE 1 TABLET BY MOUTH EVERY 12 (TWELVE) HOURS. 180 tablet 1     No facility-administered medications prior to visit.        Patient Active Problem List   Diagnosis   • Paroxysmal atrial fibrillation   • Basal cell carcinoma of skin   • Impotence of organic origin   • Gastroesophageal reflux disease   • Hyperlipidemia   • Hypertension   • Persistent insomnia   • Knee pain   • Osteoarthritis of lumbar spine   • Adiposity   • Overactive bladder   • Arthralgia of hip   • Impaired glucose tolerance   • Disorder of prostate   • Pulmonary embolism   • Inflammation of sacroiliac joint   • Generalized osteoarthritis   • Preventative health care   • Vertigo   • TIA (transient ischemic attack)   • Lymphocytic colitis       Advanced Care Planning:  has an advanced directive - a copy HAS NOT been provided. Have asked the patient to send this to us to add to record    Identification of Risk Factors:  Risk factors include: weight , cardiovascular risk, alcohol use and increased fall risk.    Review of Systems    Compared to one year ago, the patient feels his physical health is the same.  Compared to one year ago, the patient feels his mental health is the same.    Objective     Physical Exam    There were no vitals filed for this visit.    There is no height or weight on file to calculate BMI.  Discussed the patient's BMI with him. The BMI is above average; BMI management plan is  completed.    Assessment/Plan   Patient Self-Management and Personalized Health Advice  The patient has been provided with information about: diet, exercise, prevention of cardiac or vascular disease, alcohol use and supplements and preventive services including:   · Counseling for cardiovascular disease risk reduction, Exercise counseling provided, Fall Risk assessment done, Nutrition counseling provided, Urinary Incontinence assessment done.    Visit Diagnoses:    ICD-10-CM ICD-9-CM   1. Acute URI J06.9 465.9   2. Gastroesophageal reflux disease without esophagitis K21.9 530.81   3. Lymphocytic colitis K52.832 558.9   4. Essential hypertension I10 401.9   5. Pure hypercholesterolemia E78.00 272.0       Orders Placed This Encounter   Procedures   • Comprehensive Metabolic Panel   • C-reactive Protein   • Lipid Panel       Outpatient Encounter Prescriptions as of 4/11/2017   Medication Sig Dispense Refill   • aluminum-magnesium hydroxide-simethicone (MAALOX/MYLANTA) 200-200-20 MG/5ML suspension Take 15 mL by mouth Daily As Needed.     • Ascorbic Acid (VITAMIN C) 500 MG capsule Take 1 capsule by mouth daily.     • aspirin 81 MG chewable tablet Chew 1 tablet Daily.     • atenolol (TENORMIN) 50 MG tablet TAKE 1 TABLET DAILY. 90 tablet 1   • bismuth subsalicylate (PEPTO BISMOL) 262 MG/15ML suspension Take 15 mL by mouth Daily As Needed. indigestion     • Budesonide (ENTOCORT EC) 3 MG 24 hr capsule Take 3 capsules by mouth Daily.  11   • clopidogrel (PLAVIX) 75 MG tablet Take 1 tablet by mouth Daily. 90 tablet 1   • Coenzyme Q10 (CO Q-10) 200 MG capsule Take 1 capsule by mouth 3 (Three) Times a Day With Meals.     • diphenoxylate-atropine (LOMOTIL) 2.5-0.025 MG per tablet Take 2 tablets by mouth 2 (Two) Times a Day As Needed.     • Glucosamine 500 MG capsule Take 1 capsule by mouth daily.     • Ibuprofen (ADVIL) 200 MG capsule Take 2 capsules by mouth Daily As Needed. pain     • Multiple Vitamins-Minerals (CENTRUM  ADULTS) tablet Take 1-2 tablets by mouth daily.     • oxybutynin XL (DITROPAN-XL) 5 MG 24 hr tablet Take 1 tablet by mouth Every 12 (Twelve) Hours. 180 tablet 1   • ranitidine (ZANTAC) 300 MG tablet Take 1 tablet by mouth every night. 90 tablet 1   • rosuvastatin (CRESTOR) 10 MG tablet Take 1 tablet by mouth Every Other Day. Even days only 90 tablet 1   • sildenafil (VIAGRA) 100 MG tablet Take 0.5-1 tablets by mouth as needed.     • tamsulosin (FLOMAX) 0.4 MG capsule 24 hr capsule TAKE 1 CAPSULE EVERY 12 HOURS 180 capsule 1   • triamcinolone (KENALOG) 0.1 % cream Apply  topically. Apply to itchy rash twice daily as needed     • Vitamin D, Cholecalciferol, 1000 UNITS capsule Take 3 capsules by mouth daily.     • zaleplon (SONATA) 10 MG capsule Take 0.5-1 capsules by mouth at night as needed.     • [DISCONTINUED] oxybutynin XL (DITROPAN-XL) 5 MG 24 hr tablet TAKE 1 TABLET BY MOUTH EVERY 12 (TWELVE) HOURS. 180 tablet 1     No facility-administered encounter medications on file as of 4/11/2017.        Reviewed use of high risk medication in the elderly: yes  Reviewed for potential of harmful drug interactions in the elderly: yes    Follow Up:  Return in about 3 months (around 7/11/2017) for Annual.     An After Visit Summary and PPPS with all of these plans were given to the patient.     The wellness exam has been reviewed in detail.  The patient has been fully counseled on preventative guidelines for vaccines, cancer screenings, and other health maintenance needs.  Functional testing has been performed to assess capacity for independent living and need for other medical interventions.   The patient was counseled on maintaining a lifestyle to promote good health and to minimize chronic diseases.  The patient has been assisted with scheduling healthcare procedures for the coming year and given a written document outlining these recommendations.    Electronically signed Charles Scott M.D.4/12/2017 2:32 PM

## 2017-04-11 NOTE — PATIENT INSTRUCTIONS
1.  Take plain Mucinex - every 12 hours for 10 days.    2.  Call this coming Friday or Monday as needed - for an antibiotic.    3.  Continue usual medicines and supplements - as listed.    4.  Continue a well-balanced diet - low in salt and low in sugar.    5.  Return in mid July - annual checkup fasting.

## 2017-04-12 VITALS
BODY MASS INDEX: 32.34 KG/M2 | SYSTOLIC BLOOD PRESSURE: 120 MMHG | WEIGHT: 244 LBS | DIASTOLIC BLOOD PRESSURE: 80 MMHG | OXYGEN SATURATION: 98 % | TEMPERATURE: 99.1 F | RESPIRATION RATE: 14 BRPM | HEART RATE: 50 BPM | HEIGHT: 73 IN

## 2017-04-12 NOTE — PROGRESS NOTES
"Siria Lara is a 81 y.o. male.     History of Present Illness     The patient's had 3 days of cough congestion and drainage.  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  He has had no fever sore throat or body aches.    The patient's had recent protracted diarrhea.  Colonoscopy last month showed microscopic colitis.  He is been on budesonide capsules last 2 weeks and his bowels have greatly normalized.    The following portions of the patient's history were reviewed and updated as appropriate: allergies, current medications, past family history, past medical history, past social history, past surgical history and problem list.    Review of Systems   Constitutional: Negative for appetite change and fatigue.   Respiratory: Negative for shortness of breath and wheezing.    Cardiovascular: Negative for chest pain and palpitations.   Gastrointestinal: Positive for diarrhea. Negative for abdominal distention and nausea.        Diarrhea much improved on Entocort  Indigestion stable on ranitidine   Genitourinary: Positive for frequency and urgency.        Urgency and frequency improved with oxybutynin   Musculoskeletal: Positive for arthralgias. Negative for back pain and gait problem.        Occasionally takes ibuprofen for joint aches not daily   Neurological: Negative for dizziness and light-headedness.   Psychiatric/Behavioral: Positive for sleep disturbance. The patient is nervous/anxious.         Sleep quality good without medication       Objective   Blood pressure 120/80, pulse 50, temperature 99.1 °F (37.3 °C), resp. rate 14, height 73\" (185.4 cm), weight 244 lb (111 kg), SpO2 98 %.    Physical Exam   Constitutional: He is oriented to person, place, and time. He appears well-developed and well-nourished.   Cardiovascular: Normal rate, regular rhythm and normal heart sounds.    Pulmonary/Chest: Effort normal and breath sounds normal. He has no wheezes. He has no " rales.   Abdominal: Soft. Bowel sounds are normal. He exhibits no distension. There is no tenderness.   Obese   Musculoskeletal: Normal range of motion. He exhibits no edema or tenderness.   Neurological: He is alert and oriented to person, place, and time. He exhibits normal muscle tone. Coordination normal.   Psychiatric: He has a normal mood and affect. His behavior is normal. Judgment and thought content normal.   Nursing note and vitals reviewed.    Procedures  Assessment/Plan   Iván was seen today for annual exam and hyperlipidemia.    Diagnoses and all orders for this visit:    Acute URI  -     CBC & Differential  -     CBC Auto Differential    Gastroesophageal reflux disease without esophagitis    Lymphocytic colitis  -     C-reactive Protein    Essential hypertension    Pure hypercholesterolemia  -     Comprehensive Metabolic Panel  -     Lipid Panel    Other orders  -     oxybutynin XL (DITROPAN-XL) 5 MG 24 hr tablet; Take 1 tablet by mouth Every 12 (Twelve) Hours.      The patient has an acute bronchitis likely viral.  He will likely clear with Mucinex and time.  He may need a Z-Eric over the next week if this does not resolve.    Patient's microscopic colitis is in remission now on Entocort capsules.  He will hopefully come down on dosing over the next year.    The patient's status post 2 TIA's and is now doing well on aspirin and Plavix and Crestor.  His LDL at 78 is acceptable target.  He does have hyperglycemia and will need monitoring for diabetes.    The wellness exam has been reviewed in detail.  The patient has been fully counseled on preventative guidelines for vaccines, cancer screenings, and other health maintenance needs.  Functional testing has been performed to assess capacity for independent living and need for other medical interventions.   The patient was counseled on maintaining a lifestyle to promote good health and to minimize chronic diseases.  The patient has been assisted with  scheduling healthcare procedures for the coming year and given a written document outlining these recommendations.    Patient Instructions   1.  Take plain Mucinex - every 12 hours for 10 days.    2.  Call this coming Friday or Monday as needed - for an antibiotic.    3.  Continue usual medicines and supplements - as listed.    4.  Continue a well-balanced diet - low in salt and low in sugar.    5.  Return in mid July - annual checkup fasting.    6.  Blood glucose elevated at 106.  Continue low-calorie diabetic diet to prevent diabetes.    7.  Other laboratory tests are acceptable and require no change in treatment.    Electronically signed Charles Scott M.D.4/12/2017 2:48 PM

## 2017-04-14 DIAGNOSIS — J20.8 ACUTE BRONCHITIS DUE TO OTHER SPECIFIED ORGANISMS: Primary | ICD-10-CM

## 2017-04-14 RX ORDER — AZITHROMYCIN 250 MG/1
TABLET, FILM COATED ORAL
Qty: 6 TABLET | Refills: 0 | Status: SHIPPED | OUTPATIENT
Start: 2017-04-14 | End: 2017-04-17 | Stop reason: SDUPTHER

## 2017-04-14 NOTE — PROGRESS NOTES
The patient has called this morning complaining of persistent productive cough.  He was up twice last night clearing his chest.  He has no headache fever or sore throat.  His bowel movements are formed.  He is taking Tessalon with some relief.  He is taking Mucinex twice daily.  Start Z-Eric today.  Patient warned that he may have looser bowels on the Z-Eric.  Return to the office on Tuesday or Wednesday next week and let us the cough and congestion are fully recovered.

## 2017-04-17 ENCOUNTER — TELEPHONE (OUTPATIENT)
Dept: INTERNAL MEDICINE | Facility: CLINIC | Age: 82
End: 2017-04-17

## 2017-04-17 RX ORDER — AZITHROMYCIN 250 MG/1
250 TABLET, FILM COATED ORAL DAILY
Qty: 6 TABLET | Refills: 0 | Status: SHIPPED | OUTPATIENT
Start: 2017-04-17 | End: 2017-06-08 | Stop reason: ALTCHOICE

## 2017-04-17 NOTE — TELEPHONE ENCOUNTER
Called and notified pt labs acceptable except BG up at 106, continue low-calorie diabetic diet to prevent diabetes per TGF. Pt states better since started zpak (has last dose tomorrow) but still has deep chest cough with little bit of phlegm. He's stopped mucinex when started zpack bc he thought he was suppose to. Per TGF continue zpak for another 6 days, continue plain mucinex bid, come in for OX Thurs or Fri if not totally better.

## 2017-05-09 ENCOUNTER — TELEPHONE (OUTPATIENT)
Dept: INTERNAL MEDICINE | Facility: CLINIC | Age: 82
End: 2017-05-09

## 2017-05-17 ENCOUNTER — OUTSIDE FACILITY SERVICE (OUTPATIENT)
Dept: GASTROENTEROLOGY | Facility: CLINIC | Age: 82
End: 2017-05-17

## 2017-05-17 PROCEDURE — 43249 ESOPH EGD DILATION <30 MM: CPT | Performed by: INTERNAL MEDICINE

## 2017-06-08 ENCOUNTER — OFFICE VISIT (OUTPATIENT)
Dept: GASTROENTEROLOGY | Facility: CLINIC | Age: 82
End: 2017-06-08

## 2017-06-08 VITALS
HEART RATE: 45 BPM | WEIGHT: 244.4 LBS | DIASTOLIC BLOOD PRESSURE: 88 MMHG | HEIGHT: 73 IN | SYSTOLIC BLOOD PRESSURE: 142 MMHG | BODY MASS INDEX: 32.39 KG/M2 | TEMPERATURE: 97 F | OXYGEN SATURATION: 96 %

## 2017-06-08 DIAGNOSIS — K52.832 LYMPHOCYTIC COLITIS: Primary | ICD-10-CM

## 2017-06-08 DIAGNOSIS — K22.2 ESOPHAGEAL STRICTURE: ICD-10-CM

## 2017-06-08 DIAGNOSIS — K21.00 GASTROESOPHAGEAL REFLUX DISEASE WITH ESOPHAGITIS: ICD-10-CM

## 2017-06-08 PROCEDURE — 99213 OFFICE O/P EST LOW 20 MIN: CPT | Performed by: INTERNAL MEDICINE

## 2017-06-08 NOTE — PROGRESS NOTES
[facility]   History & Physical    Chief Complaint   Patient presents with   • Follow-up     EGD         Subjective       HPI  Here for follow up of microscopic colitis.  He has tapered his budesonide to 3 mg per day with excellent results. His swallowing is fine since his esophageal dilatation.  He has no symptoms of gerd and so not on PPI treatment.       Past Medical History:   Diagnosis Date   • Basal cell carcinoma    • Blood loss     post op   • HNP (herniated nucleus pulposus), lumbar ,     L5 L6   • Hyperglycemia    • Hyperlipidemia    • Hypertension    • OAB (overactive bladder)    • Obesity    • Paroxysmal atrial fibrillation     one episode - cardioverted   • Pulmonary embolism     after injury and protracted immobilization   • Right knee DJD     Partial knee arthroplasty   • Shoulder dislocation     Repeated episodes   • Sleep disturbances     Recurrent use of medication   • Transient cerebral ischemia ,     Negative medical workup on both occasions         Family History   Problem Relation Age of Onset   • Dementia Mother       age 94   • Other Father       age 86 of unknown cause   • Coronary artery disease Brother    • No Known Problems Brother    • Sick sinus syndrome Other      Has pacemaker   • Diabetes Paternal Grandmother           reports that he has never smoked. He has never used smokeless tobacco. He reports that he drinks alcohol. He reports that he does not use illicit drugs.        Current Outpatient Prescriptions:   •  Ascorbic Acid (VITAMIN C) 500 MG capsule, Take 1 capsule by mouth daily., Disp: , Rfl:   •  aspirin 81 MG chewable tablet, Chew 1 tablet Daily., Disp: , Rfl:   •  atenolol (TENORMIN) 50 MG tablet, TAKE 1 TABLET DAILY., Disp: 90 tablet, Rfl: 1  •  Budesonide (ENTOCORT EC) 3 MG 24 hr capsule, Take 2 capsules by mouth Daily., Disp: , Rfl: 11  •  clopidogrel (PLAVIX) 75 MG tablet, Take 1 tablet by mouth  "Daily., Disp: 90 tablet, Rfl: 1  •  Multiple Vitamins-Minerals (CENTRUM ADULTS) tablet, Take 1-2 tablets by mouth daily., Disp: , Rfl:   •  oxybutynin XL (DITROPAN-XL) 5 MG 24 hr tablet, Take 1 tablet by mouth Every 12 (Twelve) Hours., Disp: 180 tablet, Rfl: 1  •  ranitidine (ZANTAC) 300 MG tablet, Take 1 tablet by mouth every night., Disp: 90 tablet, Rfl: 1  •  tamsulosin (FLOMAX) 0.4 MG capsule 24 hr capsule, TAKE 1 CAPSULE EVERY 12 HOURS, Disp: 180 capsule, Rfl: 1  •  triamcinolone (KENALOG) 0.1 % cream, Apply  topically. Apply to itchy rash twice daily as needed, Disp: , Rfl:   •  Vitamin D, Cholecalciferol, 1000 UNITS capsule, Take 3 capsules by mouth daily., Disp: , Rfl:   •  sildenafil (VIAGRA) 100 MG tablet, Take 0.5-1 tablets by mouth as needed., Disp: , Rfl:     Allergies:  Atorvastatin; Qsymia [phentermine-topiramate]; and Topiramate    ROS:    Review of Systems   Constitution: Negative.   HENT: Negative.    Eyes: Negative.    Cardiovascular: Negative.    Respiratory: Negative.    Endocrine: Negative.    Hematologic/Lymphatic: Negative.    Skin: Negative.    Musculoskeletal: Negative.    Gastrointestinal: Negative.    Genitourinary: Negative.    Neurological: Negative.    Psychiatric/Behavioral: Negative.    Allergic/Immunologic: Negative.        Objective     Blood pressure 142/88, pulse (!) 45, temperature 97 °F (36.1 °C), temperature source Temporal Artery , height 73\" (185.4 cm), weight 244 lb 6.4 oz (111 kg), SpO2 96 %.    Physical Exam   Constitutional: Pt is oriented to person, place, and time and well-developed, well-nourished, and in no distress.   HENT:   Mouth/Throat: Oropharynx is clear and moist.   Neck: Normal range of motion. Neck supple.   Cardiovascular: Normal rate, regular rhythm and normal heart sounds.    Pulmonary/Chest: Effort normal and breath sounds normal. No respiratory distress. No  wheezes.   Abdominal: Soft. Bowel sounds are normal.  Soft, non-tender, normal bowel sounds; no " bruits, organomegaly or masses.  Skin: Skin is warm and dry.   Psychiatric: Mood, memory, affect and judgment normal.     Assessment/Plan   Overall he is fine at present.  He will come if he has recurrence of dysphagia.  He needs a repeat colonoscopy in March 2018 for repeat biopsies and see if healed and can stop his entercort.     Diagnosis:  Iván was seen today for follow-up.    Diagnoses and all orders for this visit:    Lymphocytic colitis    Gastroesophageal reflux disease with esophagitis    Esophageal stricture        Anticipated Surgical Procedure:    The risks, benefits, and alternatives of this procedure have been discussed with the patient or the responsible party- the patient understands and agrees to proceed.

## 2017-06-12 RX ORDER — TAMSULOSIN HYDROCHLORIDE 0.4 MG/1
CAPSULE ORAL
Qty: 180 CAPSULE | Refills: 1 | Status: SHIPPED | OUTPATIENT
Start: 2017-06-12 | End: 2017-12-30 | Stop reason: SDUPTHER

## 2017-07-20 ENCOUNTER — OFFICE VISIT (OUTPATIENT)
Dept: INTERNAL MEDICINE | Facility: CLINIC | Age: 82
End: 2017-07-20

## 2017-07-20 DIAGNOSIS — E78.00 PURE HYPERCHOLESTEROLEMIA: ICD-10-CM

## 2017-07-20 DIAGNOSIS — M15.9 GENERALIZED OSTEOARTHRITIS: ICD-10-CM

## 2017-07-20 DIAGNOSIS — R73.02 IMPAIRED GLUCOSE TOLERANCE: ICD-10-CM

## 2017-07-20 DIAGNOSIS — M79.10 MYALGIA: ICD-10-CM

## 2017-07-20 DIAGNOSIS — K52.832 LYMPHOCYTIC COLITIS: ICD-10-CM

## 2017-07-20 DIAGNOSIS — K21.9 GASTROESOPHAGEAL REFLUX DISEASE WITHOUT ESOPHAGITIS: ICD-10-CM

## 2017-07-20 DIAGNOSIS — I10 ESSENTIAL HYPERTENSION: ICD-10-CM

## 2017-07-20 DIAGNOSIS — E66.9 ADIPOSITY: ICD-10-CM

## 2017-07-20 DIAGNOSIS — N32.81 OVERACTIVE BLADDER: ICD-10-CM

## 2017-07-20 DIAGNOSIS — G47.00 PERSISTENT INSOMNIA: ICD-10-CM

## 2017-07-20 DIAGNOSIS — N42.9 DISORDER OF PROSTATE: Primary | ICD-10-CM

## 2017-07-20 DIAGNOSIS — I48.0 PAROXYSMAL ATRIAL FIBRILLATION (HCC): ICD-10-CM

## 2017-07-20 DIAGNOSIS — N40.0 PROSTATISM: ICD-10-CM

## 2017-07-20 DIAGNOSIS — R35.1 NOCTURIA: ICD-10-CM

## 2017-07-20 DIAGNOSIS — G45.1 HEMISPHERIC CAROTID ARTERY SYNDROME: ICD-10-CM

## 2017-07-20 DIAGNOSIS — G89.29 CHRONIC PAIN OF RIGHT KNEE: ICD-10-CM

## 2017-07-20 DIAGNOSIS — M25.561 CHRONIC PAIN OF RIGHT KNEE: ICD-10-CM

## 2017-07-20 LAB
ALBUMIN SERPL-MCNC: 4.2 G/DL (ref 3.2–4.8)
ALBUMIN/GLOB SERPL: 1.8 G/DL (ref 1.5–2.5)
ALP SERPL-CCNC: 66 U/L (ref 25–100)
ALT SERPL W P-5'-P-CCNC: 17 U/L (ref 7–40)
ANION GAP SERPL CALCULATED.3IONS-SCNC: 6 MMOL/L (ref 3–11)
ARTICHOKE IGE QN: 138 MG/DL (ref 0–130)
AST SERPL-CCNC: 18 U/L (ref 0–33)
BASOPHILS # BLD AUTO: 0.04 10*3/MM3 (ref 0–0.2)
BASOPHILS NFR BLD AUTO: 0.5 % (ref 0–1)
BILIRUB SERPL-MCNC: 0.9 MG/DL (ref 0.3–1.2)
BUN BLD-MCNC: 21 MG/DL (ref 9–23)
BUN/CREAT SERPL: 21 (ref 7–25)
CALCIUM SPEC-SCNC: 9.5 MG/DL (ref 8.7–10.4)
CHLORIDE SERPL-SCNC: 105 MMOL/L (ref 99–109)
CHOLEST SERPL-MCNC: 220 MG/DL (ref 0–200)
CK SERPL-CCNC: 42 U/L (ref 26–174)
CO2 SERPL-SCNC: 28 MMOL/L (ref 20–31)
CREAT BLD-MCNC: 1 MG/DL (ref 0.6–1.3)
CRP SERPL-MCNC: 0.44 MG/DL (ref 0–1)
DEPRECATED RDW RBC AUTO: 48.5 FL (ref 37–54)
EOSINOPHIL # BLD AUTO: 0.28 10*3/MM3 (ref 0–0.3)
EOSINOPHIL NFR BLD AUTO: 3.7 % (ref 0–3)
ERYTHROCYTE [DISTWIDTH] IN BLOOD BY AUTOMATED COUNT: 13.5 % (ref 11.3–14.5)
GFR SERPL CREATININE-BSD FRML MDRD: 72 ML/MIN/1.73
GLOBULIN UR ELPH-MCNC: 2.4 GM/DL
GLUCOSE BLD-MCNC: 111 MG/DL (ref 70–100)
HBA1C MFR BLD: 5.2 % (ref 4.8–5.6)
HCT VFR BLD AUTO: 46.6 % (ref 38.9–50.9)
HDLC SERPL-MCNC: 43 MG/DL (ref 40–60)
HGB BLD-MCNC: 15.5 G/DL (ref 13.1–17.5)
IMM GRANULOCYTES # BLD: 0.04 10*3/MM3 (ref 0–0.03)
IMM GRANULOCYTES NFR BLD: 0.5 % (ref 0–0.6)
LYMPHOCYTES # BLD AUTO: 1.88 10*3/MM3 (ref 0.6–4.8)
LYMPHOCYTES NFR BLD AUTO: 24.6 % (ref 24–44)
MCH RBC QN AUTO: 33 PG (ref 27–31)
MCHC RBC AUTO-ENTMCNC: 33.3 G/DL (ref 32–36)
MCV RBC AUTO: 99.1 FL (ref 80–99)
MONOCYTES # BLD AUTO: 0.54 10*3/MM3 (ref 0–1)
MONOCYTES NFR BLD AUTO: 7.1 % (ref 0–12)
NEUTROPHILS # BLD AUTO: 4.85 10*3/MM3 (ref 1.5–8.3)
NEUTROPHILS NFR BLD AUTO: 63.6 % (ref 41–71)
PLATELET # BLD AUTO: 132 10*3/MM3 (ref 150–450)
PMV BLD AUTO: 11.2 FL (ref 6–12)
POTASSIUM BLD-SCNC: 5.3 MMOL/L (ref 3.5–5.5)
PROT SERPL-MCNC: 6.6 G/DL (ref 5.7–8.2)
PSA SERPL-MCNC: 0.68 NG/ML (ref 0–4)
RBC # BLD AUTO: 4.7 10*6/MM3 (ref 4.2–5.76)
SODIUM BLD-SCNC: 139 MMOL/L (ref 132–146)
TRIGL SERPL-MCNC: 157 MG/DL (ref 0–150)
TSH SERPL DL<=0.05 MIU/L-ACNC: 2.46 MIU/ML (ref 0.35–5.35)
WBC NRBC COR # BLD: 7.63 10*3/MM3 (ref 3.5–10.8)

## 2017-07-20 PROCEDURE — 83036 HEMOGLOBIN GLYCOSYLATED A1C: CPT | Performed by: INTERNAL MEDICINE

## 2017-07-20 PROCEDURE — 36415 COLL VENOUS BLD VENIPUNCTURE: CPT | Performed by: INTERNAL MEDICINE

## 2017-07-20 PROCEDURE — 99215 OFFICE O/P EST HI 40 MIN: CPT | Performed by: INTERNAL MEDICINE

## 2017-07-20 PROCEDURE — 86140 C-REACTIVE PROTEIN: CPT | Performed by: INTERNAL MEDICINE

## 2017-07-20 PROCEDURE — 85025 COMPLETE CBC W/AUTO DIFF WBC: CPT | Performed by: INTERNAL MEDICINE

## 2017-07-20 PROCEDURE — 82550 ASSAY OF CK (CPK): CPT | Performed by: INTERNAL MEDICINE

## 2017-07-20 PROCEDURE — 80061 LIPID PANEL: CPT | Performed by: INTERNAL MEDICINE

## 2017-07-20 PROCEDURE — 84443 ASSAY THYROID STIM HORMONE: CPT | Performed by: INTERNAL MEDICINE

## 2017-07-20 PROCEDURE — 84153 ASSAY OF PSA TOTAL: CPT | Performed by: INTERNAL MEDICINE

## 2017-07-20 PROCEDURE — 80053 COMPREHEN METABOLIC PANEL: CPT | Performed by: INTERNAL MEDICINE

## 2017-07-20 PROCEDURE — 93000 ELECTROCARDIOGRAM COMPLETE: CPT | Performed by: INTERNAL MEDICINE

## 2017-07-20 RX ORDER — ZALEPLON 10 MG/1
1 CAPSULE ORAL NIGHTLY PRN
COMMUNITY
End: 2017-11-21

## 2017-07-20 RX ORDER — RANITIDINE 150 MG/1
300 TABLET ORAL NIGHTLY
Qty: 90 TABLET | Refills: 3 | Status: SHIPPED | OUTPATIENT
Start: 2017-07-20 | End: 2020-07-17

## 2017-07-20 NOTE — PROGRESS NOTES
Siria Lara is a 81 y.o. male.     Chief Complaint   Patient presents with   • Arthritis       History of Present Illness     The patient underwent partial right knee arthroplasty 2 years ago for urgent disease and refractory pain.  He has had a difficult recovery and is worked with Dr. Rosenberg for maintaining fitness and pain control.  He is starting to have more pain and occasional lives with his leg.  The patient is traveling extensively and often has to curtail activities because of his knee.  He is taking all analgesic medications.  Does have generalized osteoarthritis and is been extensively athletic person through his lifetime.  He has had moderate lumbar spondylosis and undergone 3 surgical operations.    The following portions of the patient's history were reviewed and updated as appropriate: allergies, current medications, past family history, past medical history, past social history, past surgical history and problem list.    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   • Gastroesophageal reflux disease 05/02/2016   • Hyperlipidemia 05/02/2016   • Hypertension 05/02/2016   • Persistent insomnia 05/02/2016   • Knee pain 05/02/2016   • Osteoarthritis of lumbar spine 05/02/2016   • Adiposity 05/02/2016   • Overactive bladder 05/02/2016   • Arthralgia of hip 05/02/2016   • Impaired glucose tolerance 05/02/2016   • Prostatism 05/02/2016   • Pulmonary embolism 05/02/2016   • Inflammation of sacroiliac joint 05/02/2016   • Generalized osteoarthritis 07/19/2016   • Preventative health care 07/19/2016   • Vertigo 10/07/2016   • TIA (transient ischemic attack) 10/08/2016   • Lymphocytic colitis 04/11/2017   • Esophageal stricture 06/08/2017     Resolved Ambulatory Problems     Diagnosis Date Noted   • Acute bronchitis 04/28/2016   • Diarrhea 02/20/2017     Past  Medical History:   Diagnosis Date   • Basal cell carcinoma    • Blood loss    • ED (erectile dysfunction)    • Fasting hypoglycemia    • Generalized osteoarthritis    • GERD (gastroesophageal reflux disease)    • HNP (herniated nucleus pulposus), lumbar ,    • Hyperglycemia    • Hyperlipidemia    • Hypertension    • Microscopic colitis    • OAB (overactive bladder)    • Obesity    • Paroxysmal atrial fibrillation    • Prostatism    • Pulmonary embolism    • Right knee DJD    • Shoulder dislocation    • Sleep disturbances    • Transient cerebral ischemia ,    • Vertigo      Past Surgical History:   Procedure Laterality Date   • CARDIOVERSION      Atrial fibrillation   • KNEE ARTHROPLASTY Right 2015    Partial    • KNEE ARTHROSCOPY Right 2015    Failed procedure   • LUMBAR DISC SURGERY  2014    Surgery ×2 Ruptured disc   • LUMBAR DISCECTOMY      L5 L6      Family History   Problem Relation Age of Onset   • Dementia Mother       age 94   • Other Father       age 86 of unknown cause   • Coronary artery disease Brother    • No Known Problems Brother    • Sick sinus syndrome Other      Has pacemaker   • Diabetes Paternal Grandmother      Social History     Social History   • Marital status:      Spouse name: N/A   • Number of children: N/A   • Years of education: N/A     Occupational History   • Businessman      Social History Main Topics   • Smoking status: Never Smoker   • Smokeless tobacco: Never Used   • Alcohol use Yes      Comment: Occasionally   • Drug use: No   • Sexual activity: Yes     Partners: Female      Comment: Monogamous     Other Topics Concern   • Not on file     Social History Narrative    Domestic life   lives in private home with wife        [place]   Confucianist        Sleep hygiene  chronic insomnia  -  Has used sonata in recent years - 2017  sleeps without medicine.        Caffeine use  2 or 3 cups of coffee daily        Exercise  habits   works with a         Diet     low-calorie diabetic diet low in salt and low in sugar.          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[place]        Hearing   minimal hearing impairment        Vision   corrects with lenses        Driving    no limitations             Review of Systems   Constitutional: Negative for appetite change and fatigue.   HENT: Negative for ear pain and sore throat.    Eyes: Negative for itching and visual disturbance.   Respiratory: Negative for cough and shortness of breath.    Cardiovascular: Negative for chest pain and palpitations.   Gastrointestinal: Positive for constipation. Negative for abdominal pain and nausea.        Diarrhea resolved on Entocort   Endocrine: Negative for cold intolerance and heat intolerance.   Genitourinary: Positive for frequency and urgency. Negative for dysuria and hematuria.        Nocturia stable at 2 or 3   Musculoskeletal: Positive for arthralgias. Negative for back pain and gait problem.        Moderate right knee pain   Skin: Negative for rash and wound.   Allergic/Immunologic: Negative for environmental allergies and food allergies.   Neurological: Negative for dizziness and headaches.   Hematological: Negative for adenopathy. Does not bruise/bleed easily.   Psychiatric/Behavioral: Positive for sleep disturbance. The patient is not nervous/anxious.         Uses zolpidem once or twice monthly       Objective   There were no vitals taken for this visit.    Physical Exam   Constitutional: He is oriented to person, place, and time. He appears well-developed and well-nourished. No distress.   HENT:   Right Ear: External ear normal.   Left Ear: External ear normal.   Nose: Nose normal.   Mouth/Throat: Oropharynx is clear and moist.   Eyes: EOM are normal. Pupils are equal, round, and reactive to light. No scleral icterus.   Neck: Neck supple. No JVD present.   50% range of motion   Cardiovascular: Normal rate, regular  rhythm, normal heart sounds and intact distal pulses.    No murmur heard.  Pulmonary/Chest: Effort normal and breath sounds normal. He has no wheezes. He has no rales.   Abdominal: Soft. Bowel sounds are normal. He exhibits no distension and no mass. There is no tenderness. No hernia.   Obese   Genitourinary: Rectum normal and penis normal.   Genitourinary Comments: Testicles normal.  Prostate not palpable due to sphincter tightness.   Musculoskeletal: Normal range of motion. He exhibits no edema.   Moderate tenderness medial collateral ligament right knee   Lymphadenopathy:     He has no cervical adenopathy.   Neurological: He is alert and oriented to person, place, and time. He displays normal reflexes. No cranial nerve deficit. He exhibits normal muscle tone. Coordination normal.   Vibratory normal  Romberg negative  Gait normal  Plantars downgoing     Skin: Skin is warm and dry. No rash noted.   Moderate sun damage on scalp   Psychiatric: He has a normal mood and affect. His behavior is normal. Judgment and thought content normal.   Nursing note and vitals reviewed.      ECG 12 Lead  Date/Time: 7/20/2017 8:28 AM  Performed by: CHARLES SCOTT  Authorized by: CHARLES SCOTT   Interpreted by ED physician: Charles Scott M.D.  Comparison: compared with previous ECG from 10/7/2016  Similar to previous ECG  Rhythm: sinus rhythm  Rate: bradycardic  BPM: 48  Conduction: 1st degree  ST Segments: ST segments normal  T Waves: T waves normal  QRS axis: normal  Other findings: LAE  Clinical impression: abnormal ECG  Comments: Indication - bradycardia  Baseline EKG          Assessment/Plan   Iván was seen today for arthritis.    Diagnoses and all orders for this visit:    Disorder of prostate  -     Cancel: PSA  -     PSA    Generalized osteoarthritis    Overactive bladder  -     Cancel: CBC & Differential  -     Cancel: Urinalysis With / Culture If Indicated  -     Urinalysis With / Culture If  Indicated    Chronic pain of right knee  -     Cancel: C-reactive Protein  -     Ambulatory Referral to Physical Therapy Evaluate and treat, Ortho    Persistent insomnia    Impaired glucose tolerance  -     Cancel: Hemoglobin A1c  -     Hemoglobin A1c    Gastroesophageal reflux disease without esophagitis    Adiposity    Lymphocytic colitis  -     CBC & Differential  -     C-reactive Protein  -     CBC Auto Differential    Pure hypercholesterolemia  -     Cancel: Comprehensive Metabolic Panel  -     Cancel: Lipid Panel  -     Comprehensive Metabolic Panel  -     Lipid Panel    Essential hypertension    Hemispheric carotid artery syndrome    Paroxysmal atrial fibrillation  -     ECG 12 Lead  -     Holter Monitor - 24 Hour  -     Cancel: TSH  -     TSH    Myalgia  -     CK    Nocturia   -     CBC & Differential  -     CBC Auto Differential    Prostatism    Other orders  -     raNITIdine (ZANTAC) 150 MG tablet; Take 2 tablets by mouth Every Night.      The patient's had a severe degree of pain in his right knee limiting activities.  I've asked him to work with the physical therapist again to rebuild knee fitness and strength.  He may need a neuro orthopedist since Dr. Rosenberg has moved.    The patient has prediabetes with a blood glucose at 111 fasting.  He does have diabetes in a grandmother.  Hemoglobin A1c remains normal.  He should follow a diabetic diet and have a goal weight loss of 10 pounds in the next year.      He may be a candidate for Belviq.  Intense work with the dietitian will be his best opportunity for progress.  Evaluation has obesity with a BMI of 32.  Weight loss will help blood sugars, cardiovascular risk, and arthritic pain.    The patient has hyperlipidemia with an LDL of 138.  His brother did develop coronary artery disease.  The patient has previously failed Lipitor with refractory fatigue.  We will try him on low-dose pravastatin with CoQ10 and monitor his progress.     The patient has a  "history of atrial fibrillation dating back to 1991.  Remarkably he has had little evidence of atrial fibrillation since.  His Holter monitor remains completely normal now.  He should continue on atenolol to regulate heart rate.    The patient has substantial prostatism and overactive bladder.  The combination of Flomax and oxybutynin have worked well.  He does limit fluid drinking and urinates on his due to check timing.    The patient's had many years of refractory insomnia.  In recent years he has required so not on a nightly basis.  He is doing remarkably better taking Sonata about once weekly.    She has a history of recurring GERD symptoms and one episode of esophageal stricture.  He has good symptomatic control on ranitidine.    Patient Instructions   1.  Physical therapy - rehabilitation right knee pain.    2.  Limit walking - 3 days weekly.  Use stationary cycle or water aerobics - 3 days weekly.    3.  Follow a low-calorie diet - low in salt and low in sugar.    4.  Check blood pressure once monthly - goal blood pressure 130/80.    5.  Prevnar 13 vaccine - [facility] pharmacy.    6.  Continue usual medicines and supplements - as listed.    7.  Return Holter monitored - by noon tomorrow.    8.  Return visit November - fasting checkup and wellness guidelines.    9.  Fasting hyperglycemia at 111.  Hemoglobin A1c normal.  Continue diabetic diet.    10.  LDL cholesterol elevated 138.  Start pravastatin 20 mg even days only.  Start CoQ10 200 mg daily.  Repeat lipid panel in one month.    11.  Consider work with registered dietitian and wife at \"[employer]\" for diabetic diet.    12.  Other laboratory tests are acceptable and require no change in treatment.    13.  Monitor indicates normal heart rhythm.    Current Outpatient Prescriptions:   •  Coenzyme Q10 200 MG capsule, Take 1 capsule by mouth Daily., Disp: , Rfl:   •  pravastatin (PRAVACHOL) 20 MG tablet, Take 1 tablet by mouth Every Other Day. Even days only, Disp: " , Rfl:   •  zaleplon (SONATA) 10 MG capsule, Take 1 capsule by mouth At Night As Needed., Disp: , Rfl:   •  Ascorbic Acid (VITAMIN C) 500 MG capsule, Take 1 capsule by mouth daily., Disp: , Rfl:   •  aspirin 81 MG chewable tablet, Chew 1 tablet Daily., Disp: , Rfl:   •  atenolol (TENORMIN) 50 MG tablet, TAKE 1 TABLET DAILY., Disp: 90 tablet, Rfl: 1  •  Budesonide (ENTOCORT EC) 3 MG 24 hr capsule, Take 2 capsules by mouth Daily., Disp: , Rfl: 11  •  clopidogrel (PLAVIX) 75 MG tablet, Take 1 tablet by mouth Daily., Disp: 90 tablet, Rfl: 1  •  Multiple Vitamins-Minerals (CENTRUM ADULTS) tablet, Take 1-2 tablets by mouth daily., Disp: , Rfl:   •  oxybutynin XL (DITROPAN-XL) 5 MG 24 hr tablet, Take 1 tablet by mouth Every 12 (Twelve) Hours., Disp: 180 tablet, Rfl: 1  •  raNITIdine (ZANTAC) 150 MG tablet, Take 2 tablets by mouth Every Night., Disp: 90 tablet, Rfl: 3  •  sildenafil (VIAGRA) 100 MG tablet, Take 0.5-1 tablets by mouth as needed., Disp: , Rfl:   •  tamsulosin (FLOMAX) 0.4 MG capsule 24 hr capsule, TAKE 1 CAPSULE EVERY 12 HOURS, Disp: 180 capsule, Rfl: 1  •  triamcinolone (KENALOG) 0.1 % cream, Apply  topically. Apply to itchy rash twice daily as needed, Disp: , Rfl:   •  Vitamin D, Cholecalciferol, 1000 UNITS capsule, Take 3 capsules by mouth daily., Disp: , Rfl:     Allergies   Allergen Reactions   • Atorvastatin      Fatigue   • Qsymia [Phentermine-Topiramate] Mental Status Change   • Topiramate Myalgia       Immunization History   Administered Date(s) Administered   • Influenza Split High Dose Preservative Free IM 10/01/2016   • Influenza, Quadrivalent 10/01/2015   • Pneumococcal Conjugate 01/01/2008   • Tdap 01/01/2010   • Zoster 01/01/2016     Electronically signed Charles Scott M.D.7/22/2017 2:57 PM

## 2017-07-20 NOTE — PATIENT INSTRUCTIONS
1.  Physical therapy - rehabilitation right knee pain.    2.  Limit walking - 3 days weekly.  Use stationary cycle or water aerobics - 3 days weekly.    3.  Follow a low-calorie diet - low in salt and low in sugar.    4.  Check blood pressure once monthly - goal blood pressure 130/80.    5.  Prevnar 13 vaccine - [facility] pharmacy.    6.  Continue usual medicines and supplements - as listed.    7.  Return Holter monitored - by noon tomorrow.    8.  Return visit November - fasting checkup and wellness guidelines.

## 2017-07-21 PROCEDURE — 93224 XTRNL ECG REC UP TO 48 HRS: CPT | Performed by: INTERNAL MEDICINE

## 2017-07-22 RX ORDER — UBIDECARENONE 200 MG
1 CAPSULE ORAL DAILY
COMMUNITY
End: 2017-07-26 | Stop reason: SDUPTHER

## 2017-07-22 RX ORDER — PRAVASTATIN SODIUM 20 MG
1 TABLET ORAL EVERY OTHER DAY
COMMUNITY
End: 2017-07-26 | Stop reason: SDUPTHER

## 2017-07-25 ENCOUNTER — HOSPITAL ENCOUNTER (OUTPATIENT)
Dept: PHYSICAL THERAPY | Facility: HOSPITAL | Age: 82
Setting detail: THERAPIES SERIES
Discharge: HOME | End: 2017-07-25

## 2017-07-25 DIAGNOSIS — G89.29 CHRONIC PAIN OF RIGHT KNEE: Primary | ICD-10-CM

## 2017-07-25 DIAGNOSIS — M25.561 CHRONIC PAIN OF RIGHT KNEE: Primary | ICD-10-CM

## 2017-07-25 PROCEDURE — G8979 MOBILITY GOAL STATUS: HCPCS

## 2017-07-25 PROCEDURE — 97162 PT EVAL MOD COMPLEX 30 MIN: CPT

## 2017-07-25 PROCEDURE — 97110 THERAPEUTIC EXERCISES: CPT

## 2017-07-25 PROCEDURE — G8978 MOBILITY CURRENT STATUS: HCPCS

## 2017-07-25 NOTE — THERAPY EVALUATION
Outpatient Physical Therapy Ortho Initial Evaluation   Linda     Patient Name: Iván Lara  : 1935  MRN: [id number]  Today's Date: 2017      Visit Date: 2017    Patient Active Problem List   Diagnosis   • Paroxysmal atrial fibrillation   • Basal cell carcinoma of skin   • Impotence of organic origin   • Gastroesophageal reflux disease   • Hyperlipidemia   • Hypertension   • Persistent insomnia   • Knee pain   • Osteoarthritis of lumbar spine   • Adiposity   • Overactive bladder   • Arthralgia of hip   • Impaired glucose tolerance   • Prostatism   • Pulmonary embolism   • Inflammation of sacroiliac joint   • Generalized osteoarthritis   • Preventative health care   • Vertigo   • TIA (transient ischemic attack)   • Lymphocytic colitis   • Esophageal stricture        Past Medical History:   Diagnosis Date   • Basal cell carcinoma    • Blood loss     post op   • ED (erectile dysfunction)     Responsive to Viagra   • Fasting hypoglycemia     Hemoglobin A1c normal   • Generalized osteoarthritis    • GERD (gastroesophageal reflux disease)     History esophageal stricture   • HNP (herniated nucleus pulposus), lumbar ,     L5 L6   • Hyperglycemia    • Hyperlipidemia    • Hypertension    • Microscopic colitis     Positive biopsy - colonoscopy   • OAB (overactive bladder) 2000    Persistent urgency   • Obesity    • Paroxysmal atrial fibrillation     one episode - cardioverted   • Prostatism    • Pulmonary embolism     after injury and protracted immobilization   • Right knee DJD 2015    Partial knee arthroplasty   • Shoulder dislocation     Repeated episodes   • Sleep disturbances     Frequently requires medication   • Transient cerebral ischemia , 2016    Negative medical workup on both occasions   • Vertigo         Past Surgical History:   Procedure Laterality Date   • CARDIOVERSION      Atrial fibrillation   • KNEE ARTHROPLASTY Right 2015    Partial    •  KNEE ARTHROSCOPY Right 2015    Failed procedure   • LUMBAR DISC SURGERY  2014    Surgery ×2 Ruptured disc   • LUMBAR DISCECTOMY  1988    L5 L6        Visit Dx:     ICD-10-CM ICD-9-CM   1. Chronic pain of right knee M25.561 719.46    G89.29 338.29             Patient History       07/25/17 0800          History    Chief Complaint Pain  -GB      Type of Pain Knee pain  -GB      Date Current Problem(s) Began --   Ongoing  -GB      Brief Description of Current Complaint Patient underwent a partial TKA in Aug of 2015.  He has had pain since that time, with concerns of difficulty when getting up from couch, and increased pain with prolonged standing and walking.  He has had secondary complaints of shoulder pain.  -GB      Patient's Rating of General Health Good  -GB      Occupation/sports/leisure activities He is  with steps used daily.  He exercises regularly, including use of treadmill.  He stays active with various consultation jobs.  -GB      Pain     Pain Location Knee  -GB      Pain at Present 2  -GB      Pain at Best 1  -GB      Pain at Worst 3  -GB      Pain Frequency Constant/continuous  -GB      Fall Risk Assessment    Any falls in the past year: No  -GB      Daily Activities    Primary Language English  -GB      Barriers to learning Visual  -GB      Pt Participated in POC and Goals Yes  -GB      Safety    Are you being hurt, hit, or frightened by anyone at home or in your life? No  -GB      Are you being neglected by a caregiver No  -GB        User Key  (r) = Recorded By, (t) = Taken By, (c) = Cosigned By    Initials Name Provider Type    STEVEN Shore, PT Physical Therapist                PT Ortho       07/25/17 0800    ROM (Range of Motion)    General ROM lower extremity range of motion deficits identified  -GB    General LE Assessment    ROM knee, left: LE ROM deficit;knee, right: LE ROM deficit  -GB    Left Knee    Extension/Flexion AROM Deficit -2/125  -GB    Right Knee    Extension/Flexion  AROM Deficit 2/119  -GB    MMT (Manual Muscle Testing)    General MMT Assessment lower extremity strength deficits identified  -GB    Left Hip    Hip Flexion Gross Movement (4+/5) good plus  -GB    Hip Extension Gross Movement (5/5) normal  -GB    Hip ABduction Gross Movement (4+/5) good plus  -GB    Hip ADduction Gross Movement (4/5) good  -GB    Right Hip    Hip Flexion Gross Movement (4/5) good  -GB    Hip Extension Gross Movement (5/5) normal  -GB    Hip ABduction Gross Movement (4+/5) good plus  -GB    Hip ADduction Gross Movement (4/5) good  -GB    Left Knee    Knee Extension Gross Movement (4+/5) good plus  -GB    Knee Flexion Gross Movement (4+/5) good plus  -GB    Right Knee    Knee Extension Gross Movement (4/5) good  -GB    Knee Flexion Gross Movement (4/5) good  -GB    Left Ankle/Foot    Ankle PF Gross Movement (3+/5) fair plus  -GB    Ankle Dorsiflexion Gross Movement (4/5) good  -GB    Right Ankle/Foot    Ankle PF Gross Movement (3+/5) fair plus  -GB    Ankle Dorsiflexion Gross Movement (4/5) good  -GB    Lower Extremity Flexibility    Hamstrings Left:;Moderately limited;Right:;Severely limited  -GB    RLE Quick Girth (cm)    Mid patella 44.5 cm  -GB    LLE Quick Girth (cm)    Mid patella 42.6 cm  -GB    Pathomechanics    Pathomechanics Comments (R) Knee with lateral tracking  -GB    Gait Assessment/Treatment    Gait, Comment Minimal knee extension lag with lack of terminal extension on right  -GB      User Key  (r) = Recorded By, (t) = Taken By, (c) = Cosigned By    Initials Name Provider Type     Jose Daniel Shore PT Physical Therapist                            Therapy Education       07/25/17 7005          Therapy Education    Given HEP   Written pictorial issued:  hip adduction, Abduction, hs sets, heel slides, short arc wall squats, calf and toe raises, HS and calf stretches  -GB      Program Other (comment)   Modified from pre-existing  -GB      How Provided Verbal;Demonstration;Written   -GB      Provided to Patient  -GB      Level of Understanding Verbalized;Teach back education performed;Demonstrated  -GB        User Key  (r) = Recorded By, (t) = Taken By, (c) = Cosigned By    Initials Name Provider Type    STEVEN Shore, PT Physical Therapist                PT OP Goals       07/25/17 0800       PT Short Term Goals    STG Date to Achieve 08/08/17  -GB     STG 1 Patient is independent with a HEP for ROM, flexibility and strengthening.  -GB     STG 1 Progress New  -GB     Long Term Goals    LTG Date to Achieve 08/24/17  -GB     LTG 1 Patient is independent with HEP for progressive strengthening and stabilization.  -GB     LTG 1 Progress New  -GB     LTG 2 Pain is reduced by at least 25% with frequency and/or intensity of symptoms.  -GB     LTG 2 Progress New  -GB     LTG 3 LEFS score is improved by at least 10 points.  -GB     LTG 3 Progress New  -GB     Time Calculation    PT Goal Re-Cert Due Date 08/24/17  -GB       User Key  (r) = Recorded By, (t) = Taken By, (c) = Cosigned By    Initials Name Provider Type    STEVEN Shore, PT Physical Therapist                PT Assessment/Plan       07/25/17 1425       PT Assessment    Functional Limitations Impaired gait;Limitation in home management;Performance in self-care ADL;Limitations in community activities;Performance in leisure activities;Performance in work activities;Limitations in functional capacity and performance  -GB     Impairments Endurance;Range of motion;Pain;Muscle strength;Joint integrity;Joint mobility  -GB     Assessment Comments Patient has limitations with mobility from knee pain.  -GB     Please refer to paper survey for additional self-reported information Yes  -GB     Rehab Potential Good  -GB     Patient/caregiver participated in establishment of treatment plan and goals Yes  -GB     Patient would benefit from skilled therapy intervention Yes  -GB     PT Plan    PT Frequency 2x/week  -GB     Predicted Duration  of Therapy Intervention (days/wks) 4 weeks  -GB     Planned CPT's? PT EVAL LOW COMPLEXITY: 06468;PT THER PROC EA 15 MIN: 50102;PT MANUAL THERAPY EA 15 MIN: 52839;PT NEUROMUSC RE-EDUCATION EA 15 MIN: 11555;PT GAIT TRAINING EA 15 MIN: 88629;PT HOT OR COLD PACK TREAT MCARE  -GB     PT Plan Comments Progress with ther ex in clinical and home settings with use of manual therapy as needed to help with tightness and pain control.  -GB       User Key  (r) = Recorded By, (t) = Taken By, (c) = Cosigned By    Initials Name Provider Type    STEVEN Shore, PT Physical Therapist                  Exercises       07/25/17 1400          Exercise 1    Exercise Name 1 Ther ex in clinic begins on NuStep level 4 x 5 min, total gym squats x 2 min, 20 reps calf and toe raises, Hip adduction squeezes, Hip abduction Side lying SLRs, heel slides with HS sets and hamstring stretches  -GB      Time (Minutes) 1 20  -GB        User Key  (r) = Recorded By, (t) = Taken By, (c) = Cosigned By    Initials Name Provider Type    STEVEN Shore, PT Physical Therapist                              Outcome Measures       07/25/17 1200          Lower Extremity Functional Index    Any of your usual work, housework or school activities 2  -GB      Your usual hobbies, recreational or sporting activities 2  -GB      Getting into or out of the bath 3  -GB      Walking between rooms 4  -GB      Putting on your shoes or socks 3  -GB      Squatting 3  -GB      Lifting an object, like a bag of groceries from the floor 3  -GB      Performing light activities around your home 3  -GB      Performing heavy activities around your home 3  -GB      Getting into or out of a car 3  -GB      Walking 2 blocks 4  -GB      Walking a mile 4  -GB      Going up or down 10 stairs (about 1 flight of stairs) 2  -GB      Standing for 1 hour 2  -GB      Sitting for 1 hour 4  -GB      Running on even ground 0  -GB      Running on uneven ground 0  -GB      Making sharp  turns while running fast 0  -GB      Hopping 1  -GB      Rolling over in bed 2  -GB      Total 48  -GB      Functional Assessment    Outcome Measure Options Lower Extremity Functional Scale (LEFS)  -GB        User Key  (r) = Recorded By, (t) = Taken By, (c) = Cosigned By    Initials Name Provider Type    STEVEN Shore, PT Physical Therapist            Time Calculation:   Start Time: 0815  Total Timed Code Minutes- PT: 20 minute(s)     Therapy Charges for Today     Code Description Service Date Service Provider Modifiers Qty    50346618728 HC PT EVAL MOD COMPLEXITY 3 7/25/2017 Jose Daniel Shore, PT GP 1    13243675211 HC PT THER PROC EA 15 MIN 7/25/2017 Jose Daniel Shore, PT GP 1          PT G-Codes  Outcome Measure Options: Lower Extremity Functional Scale (LEFS)         Jose Daniel Shore, PT  7/25/2017

## 2017-07-26 ENCOUNTER — TELEPHONE (OUTPATIENT)
Dept: INTERNAL MEDICINE | Facility: CLINIC | Age: 82
End: 2017-07-26

## 2017-07-26 RX ORDER — UBIDECARENONE 200 MG
200 CAPSULE ORAL DAILY
Qty: 90 CAPSULE | Refills: 1 | Status: SHIPPED | OUTPATIENT
Start: 2017-07-26

## 2017-07-26 RX ORDER — PRAVASTATIN SODIUM 20 MG
20 TABLET ORAL EVERY OTHER DAY
Qty: 90 TABLET | Refills: 1 | Status: SHIPPED | OUTPATIENT
Start: 2017-07-26 | End: 2017-11-21

## 2017-07-26 NOTE — TELEPHONE ENCOUNTER
"Left message for pt re: recent labs.  Per TGF - Fasting hyperglycemia at 111.  Hemoglobin A1c normal.  Continue diabetic diet.  LDL cholesterol elevated 138.  Start pravastatin 20 mg even days only.  Start CoQ10 200 mg daily.  Repeat lipid panel in one month.  Consider work with registered dietitian and wife at \"[employer]\" for diabetic diet.  Other laboratory tests are acceptable and require no change in treatment.   Monitor indicates normal heart rhythm. Enc to call if questions or concerns.  "

## 2017-07-27 ENCOUNTER — HOSPITAL ENCOUNTER (OUTPATIENT)
Dept: PHYSICAL THERAPY | Facility: HOSPITAL | Age: 82
Setting detail: THERAPIES SERIES
Discharge: HOME | End: 2017-07-27

## 2017-07-27 DIAGNOSIS — G89.29 CHRONIC PAIN OF RIGHT KNEE: Primary | ICD-10-CM

## 2017-07-27 DIAGNOSIS — M25.561 CHRONIC PAIN OF RIGHT KNEE: Primary | ICD-10-CM

## 2017-07-27 PROCEDURE — 97110 THERAPEUTIC EXERCISES: CPT

## 2017-07-27 NOTE — THERAPY TREATMENT NOTE
Outpatient Physical Therapy Ortho Treatment Note   Linda     Patient Name: Iván Lara  : 1935  MRN: [id number]  Today's Date: 2017      Visit Date: 2017    Visit Dx:    ICD-10-CM ICD-9-CM   1. Chronic pain of right knee M25.561 719.46    G89.29 338.29       Patient Active Problem List   Diagnosis   • Paroxysmal atrial fibrillation   • Basal cell carcinoma of skin   • Impotence of organic origin   • Gastroesophageal reflux disease   • Hyperlipidemia   • Hypertension   • Persistent insomnia   • Knee pain   • Osteoarthritis of lumbar spine   • Adiposity   • Overactive bladder   • Arthralgia of hip   • Impaired glucose tolerance   • Prostatism   • Pulmonary embolism   • Inflammation of sacroiliac joint   • Generalized osteoarthritis   • Preventative health care   • Vertigo   • TIA (transient ischemic attack)   • Lymphocytic colitis   • Esophageal stricture        Past Medical History:   Diagnosis Date   • Basal cell carcinoma    • Blood loss     post op   • ED (erectile dysfunction)     Responsive to Viagra   • Fasting hypoglycemia     Hemoglobin A1c normal   • Generalized osteoarthritis    • GERD (gastroesophageal reflux disease)     History esophageal stricture   • HNP (herniated nucleus pulposus), lumbar ,     L5 L6   • Hyperglycemia    • Hyperlipidemia    • Hypertension    • Microscopic colitis     Positive biopsy - colonoscopy   • OAB (overactive bladder)     Persistent urgency   • Obesity    • Paroxysmal atrial fibrillation     one episode - cardioverted   • Prostatism    • Pulmonary embolism     after injury and protracted immobilization   • Right knee DJD     Partial knee arthroplasty   • Shoulder dislocation     Repeated episodes   • Sleep disturbances     Frequently requires medication   • Transient cerebral ischemia ,     Negative medical workup on both occasions   • Vertigo         Past Surgical History:   Procedure Laterality Date    • CARDIOVERSION  1991    Atrial fibrillation   • KNEE ARTHROPLASTY Right 2015    Partial    • KNEE ARTHROSCOPY Right 2015    Failed procedure   • LUMBAR DISC SURGERY  2014    Surgery ×2 Ruptured disc   • LUMBAR DISCECTOMY  1988    L5 L6              PT Ortho       07/25/17 0800    ROM (Range of Motion)    General ROM lower extremity range of motion deficits identified  -GB    General LE Assessment    ROM knee, left: LE ROM deficit;knee, right: LE ROM deficit  -GB    Left Knee    Extension/Flexion AROM Deficit -2/125  -GB    Right Knee    Extension/Flexion AROM Deficit 2/119  -GB    MMT (Manual Muscle Testing)    General MMT Assessment lower extremity strength deficits identified  -GB    Left Hip    Hip Flexion Gross Movement (4+/5) good plus  -GB    Hip Extension Gross Movement (5/5) normal  -GB    Hip ABduction Gross Movement (4+/5) good plus  -GB    Hip ADduction Gross Movement (4/5) good  -GB    Right Hip    Hip Flexion Gross Movement (4/5) good  -GB    Hip Extension Gross Movement (5/5) normal  -GB    Hip ABduction Gross Movement (4+/5) good plus  -GB    Hip ADduction Gross Movement (4/5) good  -GB    Left Knee    Knee Extension Gross Movement (4+/5) good plus  -GB    Knee Flexion Gross Movement (4+/5) good plus  -GB    Right Knee    Knee Extension Gross Movement (4/5) good  -GB    Knee Flexion Gross Movement (4/5) good  -GB    Left Ankle/Foot    Ankle PF Gross Movement (3+/5) fair plus  -GB    Ankle Dorsiflexion Gross Movement (4/5) good  -GB    Right Ankle/Foot    Ankle PF Gross Movement (3+/5) fair plus  -GB    Ankle Dorsiflexion Gross Movement (4/5) good  -GB    Lower Extremity Flexibility    Hamstrings Left:;Moderately limited;Right:;Severely limited  -GB    RLE Quick Girth (cm)    Mid patella 44.5 cm  -GB    LLE Quick Girth (cm)    Mid patella 42.6 cm  -GB    Pathomechanics    Pathomechanics Comments (R) Knee with lateral tracking  -GB    Gait Assessment/Treatment    Gait, Comment Minimal knee extension  lag with lack of terminal extension on right  -GB      User Key  (r) = Recorded By, (t) = Taken By, (c) = Cosigned By    Initials Name Provider Type    STEVEN Shore, PT Physical Therapist                            PT Assessment/Plan       07/27/17 0846       PT Assessment    Assessment Comments Patient did well with progression of program with only knee discomfort reported during BOSU lunges and if too much motion attained in squats on Total gym.  -GB     PT Plan    PT Plan Comments Consider taping or Astym.  -GB       User Key  (r) = Recorded By, (t) = Taken By, (c) = Cosigned By    Initials Name Provider Type    STEVEN Jose Daniel Cooleyncer Silviano, PT Physical Therapist                    Exercises       07/27/17 0800          Subjective Comments    Subjective Comments Patient had difficulty sleeping because of his left shoulder pain.  His knee pain is slightly increased as well.  -GB      Subjective Pain    Able to rate subjective pain? yes  -GB      Pre-Treatment Pain Level 4  -GB      Post-Treatment Pain Level 4  -GB      Exercise 1    Exercise Name 1 NuStep at level 5  -GB      Time (Minutes) 1 5  -GB      Exercise 2    Exercise Name 2 Total gym squats  -GB      Time (Minutes) 2 2  -GB      Exercise 3    Exercise Name 3 Total gym calf press  -GB      Reps 3 20  -GB      Exercise 4    Exercise Name 4 Total gym toe raise  -GB      Reps 4 20  -GB      Exercise 5    Exercise Name 5 Hip adduction squeeze with bridging  -GB      Reps 5 10  -GB      Exercise 6    Exercise Name 6 Hip ABDuction SLRs from side lying  -GB      Reps 6 10  -GB      Exercise 7    Exercise Name 7 Heel slides with pressure  -GB      Reps 7 10  -GB      Exercise 8    Exercise Name 8 Knee flexion plate slides  -GB      Reps 8 10  -GB      Exercise 9    Exercise Name 9 STanding 3-way hip leg raises  -GB      Reps 9 10  -GB      Exercise 10    Exercise Name 10 Short arc BOSU lunges  -GB      Reps 10 10  -GB      Exercise 11    Exercise Name 11 SAQs   -GB      Reps 11 10  -GB      Additional Comments some complaints of right lower back pain with positioning  -GB      Exercise 12    Exercise Name 12 Prop Ups  -GB      Time (Seconds) 12 30  -GB      Exercise 13    Exercise Name 13 Rec Bike  -GB      Time (Minutes) 13 5  -GB      Exercise 14    Exercise Name 14 HS stretch  -GB      Time (Minutes) 14 30  -GB        User Key  (r) = Recorded By, (t) = Taken By, (c) = Cosigned By    Initials Name Provider Type    STEVEN Shore PT Physical Therapist                                       Outcome Measures       07/25/17 1200          Lower Extremity Functional Index    Any of your usual work, housework or school activities 2  -GB      Your usual hobbies, recreational or sporting activities 2  -GB      Getting into or out of the bath 3  -GB      Walking between rooms 4  -GB      Putting on your shoes or socks 3  -GB      Squatting 3  -GB      Lifting an object, like a bag of groceries from the floor 3  -GB      Performing light activities around your home 3  -GB      Performing heavy activities around your home 3  -GB      Getting into or out of a car 3  -GB      Walking 2 blocks 4  -GB      Walking a mile 4  -GB      Going up or down 10 stairs (about 1 flight of stairs) 2  -GB      Standing for 1 hour 2  -GB      Sitting for 1 hour 4  -GB      Running on even ground 0  -GB      Running on uneven ground 0  -GB      Making sharp turns while running fast 0  -GB      Hopping 1  -GB      Rolling over in bed 2  -GB      Total 48  -GB      Functional Assessment    Outcome Measure Options Lower Extremity Functional Scale (LEFS)  -GB        User Key  (r) = Recorded By, (t) = Taken By, (c) = Cosigned By    Initials Name Provider Type    STEVEN Shore PT Physical Therapist            Time Calculation:   Start Time: 0800  Total Timed Code Minutes- PT: 45 minute(s)    Therapy Charges for Today     Code Description Service Date Service Provider Modifiers Qty     53601327786  PT THER PROC EA 15 MIN 7/27/2017 Jose Daniel Shore, PT GP 3                    Jose Daniel Shore, PT  7/27/2017

## 2017-08-01 ENCOUNTER — HOSPITAL ENCOUNTER (OUTPATIENT)
Dept: PHYSICAL THERAPY | Facility: HOSPITAL | Age: 82
Setting detail: THERAPIES SERIES
Discharge: HOME | End: 2017-08-01

## 2017-08-01 DIAGNOSIS — M25.561 CHRONIC PAIN OF RIGHT KNEE: Primary | ICD-10-CM

## 2017-08-01 DIAGNOSIS — G89.29 CHRONIC PAIN OF RIGHT KNEE: Primary | ICD-10-CM

## 2017-08-01 PROCEDURE — 97110 THERAPEUTIC EXERCISES: CPT

## 2017-08-01 NOTE — THERAPY TREATMENT NOTE
Outpatient Physical Therapy Ortho Treatment Note   Linda     Patient Name: Iván Lara  : 1935  MRN: [id number]  Today's Date: 2017      Visit Date: 2017    Visit Dx:    ICD-10-CM ICD-9-CM   1. Chronic pain of right knee M25.561 719.46    G89.29 338.29       Patient Active Problem List   Diagnosis   • Paroxysmal atrial fibrillation   • Basal cell carcinoma of skin   • Impotence of organic origin   • Gastroesophageal reflux disease   • Hyperlipidemia   • Hypertension   • Persistent insomnia   • Knee pain   • Osteoarthritis of lumbar spine   • Adiposity   • Overactive bladder   • Arthralgia of hip   • Impaired glucose tolerance   • Prostatism   • Pulmonary embolism   • Inflammation of sacroiliac joint   • Generalized osteoarthritis   • Preventative health care   • Vertigo   • TIA (transient ischemic attack)   • Lymphocytic colitis   • Esophageal stricture        Past Medical History:   Diagnosis Date   • Basal cell carcinoma    • Blood loss     post op   • ED (erectile dysfunction)     Responsive to Viagra   • Fasting hypoglycemia     Hemoglobin A1c normal   • Generalized osteoarthritis    • GERD (gastroesophageal reflux disease)     History esophageal stricture   • HNP (herniated nucleus pulposus), lumbar ,     L5 L6   • Hyperglycemia    • Hyperlipidemia    • Hypertension    • Microscopic colitis     Positive biopsy - colonoscopy   • OAB (overactive bladder)     Persistent urgency   • Obesity    • Paroxysmal atrial fibrillation     one episode - cardioverted   • Prostatism    • Pulmonary embolism     after injury and protracted immobilization   • Right knee DJD     Partial knee arthroplasty   • Shoulder dislocation     Repeated episodes   • Sleep disturbances     Frequently requires medication   • Transient cerebral ischemia ,     Negative medical workup on both occasions   • Vertigo         Past Surgical History:   Procedure Laterality Date    • CARDIOVERSION  1991    Atrial fibrillation   • KNEE ARTHROPLASTY Right 2015    Partial    • KNEE ARTHROSCOPY Right 2015    Failed procedure   • LUMBAR DISC SURGERY  2014    Surgery ×2 Ruptured disc   • LUMBAR DISCECTOMY  1988    L5 L6                              PT Assessment/Plan       08/01/17 0848       PT Assessment    Assessment Comments Patient with mproved knee flexion.  He seemed to do well with corner of mat squats which replicate getting up from lower surfaces.  -GB     PT Plan    PT Plan Comments Reconsider taping or Astym as previously planned.  -GB       User Key  (r) = Recorded By, (t) = Taken By, (c) = Cosigned By    Initials Name Provider Type    GB Jose Daniel Shore, PT Physical Therapist                    Exercises       08/01/17 0800          Subjective Comments    Subjective Comments Patient reports that he walked 2 miles, performed exercises with his HEP and had no complaints.  -GB      Subjective Pain    Able to rate subjective pain? yes  -GB      Pre-Treatment Pain Level 2  -GB      Post-Treatment Pain Level 2  -GB      Exercise 1    Exercise Name 1 NuStep at level 5  -GB      Time (Minutes) 1 7  -GB      Exercise 2    Exercise Name 2 Total gym squats  -GB      Time (Minutes) 2 2  -GB      Exercise 3    Exercise Name 3 Total gym calf press  -GB      Reps 3 25  -GB      Exercise 4    Exercise Name 4 Gait drills in parallel bars  -GB      Additional Comments 3 laps each:  High step pause, side step, backward walk and in line heel-toe  -GB      Exercise 5    Exercise Name 5 Step ups  -GB      Reps 5 10  -GB      Additional Comments No risers  -GB      Exercise 6    Exercise Name 6 Hip ABDuction SLRs from side lying  -GB      Reps 6 15  -GB      Exercise 7    Exercise Name 7 Hip Adduction with bridging  -GB      Reps 7 15  -GB      Exercise 8    Exercise Name 8 1/2 bridges  -GB      Reps 8 10  -GB      Exercise 9    Exercise Name 9 Stool walking  -GB      Additional Comments 20 ft x 2  -GB       Exercise 10    Exercise Name 10 Short arc BOSU lunges  -GB      Reps 10 10  -GB      Exercise 11    Exercise Name 11 Corner-Mat sit-stand progressions  -GB      Sets 11 2  -GB      Reps 11 10  -GB      Exercise 12    Exercise Name 12 Knee flexion plate slides  -GB      Additional Comments 1 min per leg  -GB      Exercise 13    Exercise Name 13 Hamstring stretch  -GB      Reps 13 2  -GB      Time (Seconds) 13 30  -GB        User Key  (r) = Recorded By, (t) = Taken By, (c) = Cosigned By    Initials Name Provider Type    STEVEN Shore, PT Physical Therapist                                       Time Calculation:   Start Time: 0800  Total Timed Code Minutes- PT: 43 minute(s)    Therapy Charges for Today     Code Description Service Date Service Provider Modifiers Qty    22918967258 HC PT THER PROC EA 15 MIN 8/1/2017 Jose Daniel Shore, PT GP 3                    Jose Daniel Shore, PT  8/1/2017

## 2017-08-03 ENCOUNTER — APPOINTMENT (OUTPATIENT)
Dept: PHYSICAL THERAPY | Facility: HOSPITAL | Age: 82
End: 2017-08-03

## 2017-08-22 ENCOUNTER — HOSPITAL ENCOUNTER (OUTPATIENT)
Dept: PHYSICAL THERAPY | Facility: HOSPITAL | Age: 82
Setting detail: THERAPIES SERIES
Discharge: HOME | End: 2017-08-22

## 2017-08-22 DIAGNOSIS — G89.29 CHRONIC PAIN OF RIGHT KNEE: Primary | ICD-10-CM

## 2017-08-22 DIAGNOSIS — M25.561 CHRONIC PAIN OF RIGHT KNEE: Primary | ICD-10-CM

## 2017-08-22 PROCEDURE — G8978 MOBILITY CURRENT STATUS: HCPCS

## 2017-08-22 PROCEDURE — G8979 MOBILITY GOAL STATUS: HCPCS

## 2017-08-22 PROCEDURE — 97110 THERAPEUTIC EXERCISES: CPT

## 2017-08-22 NOTE — THERAPY RE-EVALUATION
Outpatient Physical Therapy Ortho Re-Assessment   Linda     Patient Name: Iván Lara  : 1935  MRN: [id number]  Today's Date: 2017      Visit Date: 2017    Patient Active Problem List   Diagnosis   • Paroxysmal atrial fibrillation   • Basal cell carcinoma of skin   • Impotence of organic origin   • Gastroesophageal reflux disease   • Hyperlipidemia   • Hypertension   • Persistent insomnia   • Knee pain   • Osteoarthritis of lumbar spine   • Adiposity   • Overactive bladder   • Arthralgia of hip   • Impaired glucose tolerance   • Prostatism   • Pulmonary embolism   • Inflammation of sacroiliac joint   • Generalized osteoarthritis   • Preventative health care   • Vertigo   • TIA (transient ischemic attack)   • Lymphocytic colitis   • Esophageal stricture        Past Medical History:   Diagnosis Date   • Basal cell carcinoma    • Blood loss     post op   • ED (erectile dysfunction)     Responsive to Viagra   • Fasting hypoglycemia     Hemoglobin A1c normal   • Generalized osteoarthritis    • GERD (gastroesophageal reflux disease)     History esophageal stricture   • HNP (herniated nucleus pulposus), lumbar ,     L5 L6   • Hyperglycemia    • Hyperlipidemia    • Hypertension    • Microscopic colitis     Positive biopsy - colonoscopy   • OAB (overactive bladder)     Persistent urgency   • Obesity    • Paroxysmal atrial fibrillation     one episode - cardioverted   • Prostatism    • Pulmonary embolism     after injury and protracted immobilization   • Right knee DJD 2015    Partial knee arthroplasty   • Shoulder dislocation     Repeated episodes   • Sleep disturbances     Frequently requires medication   • Transient cerebral ischemia ,     Negative medical workup on both occasions   • Vertigo         Past Surgical History:   Procedure Laterality Date   • CARDIOVERSION  1991    Atrial fibrillation   • KNEE ARTHROPLASTY Right 2015    Partial    • KNEE  ARTHROSCOPY Right 2015    Failed procedure   • LUMBAR DISC SURGERY  2014    Surgery ×2 Ruptured disc   • LUMBAR DISCECTOMY  1988    L5 L6        Visit Dx:     ICD-10-CM ICD-9-CM   1. Chronic pain of right knee M25.561 719.46    G89.29 338.29                 PT Ortho       08/22/17 1300    Subjective Comments    Subjective Comments Patient has been compliant with HEP.  He has walked in pool and remained active, but his pain level is no different if not a little worse.  -GB    Subjective Pain    Able to rate subjective pain? yes  -GB    Pre-Treatment Pain Level 3  -GB    Post-Treatment Pain Level 3  -GB    Left Knee    Extension/Flexion AROM Deficit -2/124  -GB    Right Knee    Extension/Flexion AROM Deficit 3/118  -GB    Left Hip    Hip Flexion Gross Movement (5/5) normal  -GB    Hip Extension Gross Movement (5/5) normal  -GB    Hip ABduction Gross Movement (5/5) normal  -GB    Hip ADduction Gross Movement (5/5) normal  -GB    Right Hip    Hip Flexion Gross Movement (4+/5) good plus  -GB    Hip Extension Gross Movement (5/5) normal  -GB    Hip ABduction Gross Movement (5/5) normal  -GB    Hip ADduction Gross Movement (4+/5) good plus  -GB    Left Knee    Knee Extension Gross Movement (5/5) normal  -GB    Knee Flexion Gross Movement (5/5) normal  -GB    Right Knee    Knee Extension Gross Movement (4+/5) good plus  -GB    Knee Flexion Gross Movement (4+/5) good plus  -GB    RLE Quick Girth (cm)    Mid patella 45.3 cm  -GB    LLE Quick Girth (cm)    Mid patella 43 cm  -GB    Pathomechanics    Pathomechanics Comments Normal Patellar tracking  -GB      User Key  (r) = Recorded By, (t) = Taken By, (c) = Cosigned By    Initials Name Provider Type    STEVEN Shore, PT Physical Therapist                                PT OP Goals       08/22/17 1300       PT Short Term Goals    STG Date to Achieve 08/08/17  -GB     STG 1 Patient is independent with a HEP for ROM, flexibility and strengthening.  -GB     STG 1 Progress  Met  -GB     Long Term Goals    LTG Date to Achieve 08/24/17  -GB     LTG 1 Patient is independent with HEP for progressive strengthening and stabilization.  -GB     LTG 1 Progress Met  -GB     LTG 2 Pain is reduced by at least 25% with frequency and/or intensity of symptoms.  -GB     LTG 2 Progress Not Met  -GB     LTG 3 LEFS score is improved by at least 10 points.  -GB     LTG 3 Progress Not Met  -GB       User Key  (r) = Recorded By, (t) = Taken By, (c) = Cosigned By    Initials Name Provider Type    STEVEN Shore, PT Physical Therapist                PT Assessment/Plan       08/22/17 1341       PT Assessment    Functional Limitations Impaired gait;Limitation in home management;Performance in self-care ADL;Limitations in community activities;Performance in leisure activities;Performance in work activities;Limitations in functional capacity and performance  -GB     Impairments Endurance;Range of motion;Pain;Muscle strength;Joint integrity;Joint mobility  -GB     Assessment Comments Despite improved strength, Mr Lara is having continued pain, edema and mildly limited ROM of right knee.    -     Please refer to paper survey for additional self-reported information Yes  -GB     Rehab Potential Fair  -GB     Patient/caregiver participated in establishment of treatment plan and goals Yes  -GB     Patient would benefit from skilled therapy intervention Yes  -GB     PT Plan    PT Frequency One time visit  -GB     Predicted Duration of Therapy Intervention (days/wks) 4 weeks  -GB     PT Plan Comments Follow up with care after Ortho Consult in 3-4 weeks  -GB       User Key  (r) = Recorded By, (t) = Taken By, (c) = Cosigned By    Initials Name Provider Type    STEVEN Shore, PT Physical Therapist                  Exercises       08/22/17 1300          Subjective Comments    Subjective Comments Patient has been compliant with HEP.  He has walked in pool and remained active, but his pain level is no  different if not a little worse.  -GB      Subjective Pain    Able to rate subjective pain? yes  -GB      Pre-Treatment Pain Level 3  -GB      Post-Treatment Pain Level 3  -GB      Exercise 1    Exercise Name 1 NuStep at level 5  -GB      Time (Minutes) 1 7  -GB      Exercise 2    Exercise Name 2 Total gym squats  -GB      Exercise 3    Exercise Name 3 Step ups and step downs  -GB      Reps 3 3  -GB      Additional Comments Normal tracking  -GB      Exercise 4    Exercise Name 4 Short arc lunges  -GB      Reps 4 3  -GB      Additional Comments Normal tracking  -GB      Exercise 5    Exercise Name 5 HS stretching  -GB      Time (Minutes) 5 1  -GB        User Key  (r) = Recorded By, (t) = Taken By, (c) = Cosigned By    Initials Name Provider Type    STEVEN Shore PT Physical Therapist                              Outcome Measures       08/22/17 1300          Lower Extremity Functional Index    Any of your usual work, housework or school activities 2  -GB      Your usual hobbies, recreational or sporting activities 1  -GB      Getting into or out of the bath 3  -GB      Walking between rooms 3  -GB      Putting on your shoes or socks 2  -GB      Squatting 2  -GB      Lifting an object, like a bag of groceries from the floor 3  -GB      Performing light activities around your home 3  -GB      Performing heavy activities around your home 2  -GB      Getting into or out of a car 3  -GB      Walking 2 blocks 3  -GB      Walking a mile 2  -GB      Going up or down 10 stairs (about 1 flight of stairs) 2  -GB      Standing for 1 hour 2  -GB      Sitting for 1 hour 3  -GB      Running on even ground 1  -GB      Running on uneven ground 1  -GB      Making sharp turns while running fast 0  -GB      Hopping 0  -GB      Rolling over in bed 3  -GB      Total 41  -GB      Functional Assessment    Outcome Measure Options Lower Extremity Functional Scale (LEFS)  -GB        User Key  (r) = Recorded By, (t) = Taken By, (c) =  Cosigned By    Initials Name Provider Type    GB Jose Daniel Shore, PT Physical Therapist            Time Calculation:   Start Time: 1258  Total Timed Code Minutes- PT: 40 minute(s)     Therapy Charges for Today     Code Description Service Date Service Provider Modifiers Qty    41905723330 HC PT THER PROC EA 15 MIN 8/22/2017 Jose Daniel Shore, PT GP 3    91276961461 HC PT MOBILITY CURRENT 8/22/2017 Jose Daniel Shore, PT GP, CK 1    38146032902 HC PT MOBILITY PROJECTED 8/22/2017 Jose Daniel Shore PT GP, CJ 1          PT G-Codes  PT Professional Judgement Used?: Yes  Outcome Measure Options: Lower Extremity Functional Scale (LEFS)  Score: 41/80  Functional Limitation: Mobility: Walking and moving around  Mobility: Walking and Moving Around Current Status (): At least 40 percent but less than 60 percent impaired, limited or restricted  Mobility: Walking and Moving Around Goal Status (): At least 20 percent but less than 40 percent impaired, limited or restricted         Jose Daniel Shore PT  8/22/2017

## 2017-08-24 ENCOUNTER — APPOINTMENT (OUTPATIENT)
Dept: PHYSICAL THERAPY | Facility: HOSPITAL | Age: 82
End: 2017-08-24

## 2017-08-29 ENCOUNTER — APPOINTMENT (OUTPATIENT)
Dept: PHYSICAL THERAPY | Facility: HOSPITAL | Age: 82
End: 2017-08-29

## 2017-08-31 ENCOUNTER — APPOINTMENT (OUTPATIENT)
Dept: PHYSICAL THERAPY | Facility: HOSPITAL | Age: 82
End: 2017-08-31

## 2017-09-05 ENCOUNTER — APPOINTMENT (OUTPATIENT)
Dept: PHYSICAL THERAPY | Facility: HOSPITAL | Age: 82
End: 2017-09-05

## 2017-09-19 ENCOUNTER — APPOINTMENT (OUTPATIENT)
Dept: PHYSICAL THERAPY | Facility: HOSPITAL | Age: 82
End: 2017-09-19

## 2017-10-01 RX ORDER — ATENOLOL 50 MG/1
TABLET ORAL
Qty: 90 TABLET | Refills: 1 | Status: CANCELLED | OUTPATIENT
Start: 2017-10-01

## 2017-10-02 ENCOUNTER — TELEPHONE (OUTPATIENT)
Dept: INTERNAL MEDICINE | Facility: CLINIC | Age: 82
End: 2017-10-02

## 2017-10-02 RX ORDER — ATENOLOL 50 MG/1
50 TABLET ORAL DAILY
Qty: 90 TABLET | Refills: 1 | Status: SHIPPED | OUTPATIENT
Start: 2017-10-02 | End: 2018-04-10 | Stop reason: SDUPTHER

## 2017-10-02 NOTE — TELEPHONE ENCOUNTER
----- Message from Elizabeth Milton sent at 10/2/2017  9:29 AM EDT -----  Patient called and needs a refill called to [facility]  Of his atenolol no refills left

## 2017-11-13 RX ORDER — CLOPIDOGREL BISULFATE 75 MG/1
TABLET ORAL
Qty: 90 TABLET | Refills: 1 | Status: SHIPPED | OUTPATIENT
Start: 2017-11-13 | End: 2020-07-17

## 2017-11-21 PROBLEM — M25.512 LEFT SHOULDER PAIN: Status: ACTIVE | Noted: 2017-11-21

## 2017-11-21 PROCEDURE — 80053 COMPREHEN METABOLIC PANEL: CPT | Performed by: INTERNAL MEDICINE

## 2017-11-21 PROCEDURE — 80061 LIPID PANEL: CPT | Performed by: INTERNAL MEDICINE

## 2017-11-28 ENCOUNTER — TELEPHONE (OUTPATIENT)
Dept: INTERNAL MEDICINE | Facility: CLINIC | Age: 82
End: 2017-11-28

## 2017-11-28 NOTE — TELEPHONE ENCOUNTER
Called labs to pt. Per TGF  -LDL improved at 113.  Close to target. Not taking any pravastatin ~ 4-6 weeks.  Woke up w cramps a two  Nights in a row and stopped.  He says that is his typical reaction to statins.  Would like to cont to work w diet since LDL is improved  Per TGF this is ok.  -glucose mildly elevated 107.  Continue low salt diabetic diet.  Chemistry  panel is otherwise acceptable.  Pt verb understanding.

## 2017-12-06 ENCOUNTER — HOSPITAL ENCOUNTER (OUTPATIENT)
Dept: PHYSICAL THERAPY | Facility: HOSPITAL | Age: 82
Setting detail: THERAPIES SERIES
Discharge: HOME | End: 2017-12-06

## 2017-12-06 DIAGNOSIS — M25.512 CHRONIC LEFT SHOULDER PAIN: Primary | ICD-10-CM

## 2017-12-06 DIAGNOSIS — G89.29 CHRONIC LEFT SHOULDER PAIN: Primary | ICD-10-CM

## 2017-12-06 PROCEDURE — G8985 CARRY GOAL STATUS: HCPCS

## 2017-12-06 PROCEDURE — 97110 THERAPEUTIC EXERCISES: CPT

## 2017-12-06 PROCEDURE — 97161 PT EVAL LOW COMPLEX 20 MIN: CPT

## 2017-12-06 PROCEDURE — G8984 CARRY CURRENT STATUS: HCPCS

## 2017-12-06 NOTE — THERAPY EVALUATION
Outpatient Physical Therapy Ortho Initial Evaluation   Linda     Patient Name: Iván Lara  : 1935  MRN: [id number]  Today's Date: 2017      Visit Date: 2017    Patient Active Problem List   Diagnosis   • Paroxysmal atrial fibrillation   • Basal cell carcinoma of skin   • Impotence of organic origin   • Gastroesophageal reflux disease   • Hyperlipidemia   • Hypertension   • Persistent insomnia   • Knee pain   • Osteoarthritis of lumbar spine   • Adiposity   • Overactive bladder   • Arthralgia of hip   • Impaired glucose tolerance   • Prostatism   • Pulmonary embolism   • Inflammation of sacroiliac joint   • Generalized osteoarthritis   • Preventative health care   • Vertigo   • TIA (transient ischemic attack)   • Lymphocytic colitis   • Esophageal stricture   • Left shoulder pain        Past Medical History:   Diagnosis Date   • Basal cell carcinoma    • Blood loss     post op   • ED (erectile dysfunction)     Responsive to Viagra   • Failed total knee, right    • Fasting hypoglycemia     Hemoglobin A1c normal   • Generalized osteoarthritis    • GERD (gastroesophageal reflux disease)     History esophageal stricture   • History of blood clots    • HNP (herniated nucleus pulposus), lumbar ,     L5 L6   • Hyperglycemia    • Hyperlipidemia    • Hypertension    • Microscopic colitis     Positive biopsy - colonoscopy   • OAB (overactive bladder)     Persistent urgency   • Obesity Adulthood     body weight 244 BMI 32   • Painful total knee replacement    • Paroxysmal atrial fibrillation     one episode - cardioverted   • Prostatism    • Pulmonary embolism     after injury and protracted immobilization   • Right knee DJD     Partial knee arthroplasty   • Shoulder dislocation     Repeated episodes   • Sleep disturbances     Frequently requires medication   • Transient cerebral ischemia ,     Negative medical workup on both occasions   • Venous  stasis    • Vertigo         Past Surgical History:   Procedure Laterality Date   • CARDIOVERSION  1991    Atrial fibrillation   • KNEE ARTHROPLASTY Right 2015    Partial    • KNEE ARTHROSCOPY Right 2015    Failed procedure   • LUMBAR DISC SURGERY  2014    Surgery ×2 Ruptured disc   • LUMBAR DISCECTOMY  1988    L5 L6        Visit Dx:     ICD-10-CM ICD-9-CM   1. Chronic left shoulder pain M25.512 719.41    G89.29 338.29             Patient History       12/06/17 0700          History    Chief Complaint Pain  -GB      Type of Pain Shoulder pain  -GB      Date Current Problem(s) Began --   ~ 2-3 months ago  -GB      Brief Description of Current Complaint Patient has had 6 dislocations of his left shoulder over the course of his lifetime, with the initial two occurring with sports related injury.  He has pain when reaching upward, behind back, or when resting on it.  -GB      Patient/Caregiver Goals Relieve pain  -GB      Patient's Rating of General Health Excellent  -GB      Occupation/sports/leisure activities He is independently involved in multiple businesses.   He remains active with exercise.  -GB      Pain     Pain Location Shoulder  -GB      Pain at Present 3  -GB      Pain at Best 3  -GB      Pain at Worst 9  -GB      Fall Risk Assessment    Any falls in the past year: No  -GB      Daily Activities    Primary Language English  -GB      Barriers to learning Visual  -GB      Pt Participated in POC and Goals Yes  -GB      Safety    Are you being hurt, hit, or frightened by anyone at home or in your life? No  -GB        User Key  (r) = Recorded By, (t) = Taken By, (c) = Cosigned By    Initials Name Provider Type     Jose Daniel Shroe PT Physical Therapist                PT Ortho       12/06/17 0900    Precautions and Contraindications    Precautions/Limitations --   History of left shoulder dislocation  -GB    Posture/Observations    Posture/Observations Comments Minimal flattening of left Deltoid  -GB     Special Tests/Palpation    Special Tests/Palpation Shoulder  -GB    Shoulder Girdle Palpation    Subacromial Space Left:;Tender  -GB    Supraspinatus Insertion Left:;Tender;Guarded/taut  -GB    AC Joint Left:;Tender;Crepitus  -GB    Long Head of Biceps Left:   No tenderness  -GB    Short Head of Biceps Left:    No tenderness  -GB    Deltoid Left:;Guarded/taut  -GB    Subscapularis Left:;Tender  -GB    Infraspinatus Left:;Swollen  -GB    Teres Minor Left:;Swollen  -GB    Upper Trap Left:;Guarded/taut;Trigger point  -GB    Shoulder Girdle Palpation? Yes  -GB    Shoulder Impingement/Rotator Cuff Special Tests    Johnson-Richi Test (RC Lesion vs. Bursitis) Left:;Positive  -GB    Neer Impingement Test (RC Lesion vs. Bursitis) Unable to test  -GB    Empty Can Test (RC Lesion) Left:;Negative  -GB    Drop Arm Test (Full Thickness RC Lesion) Left:;Negative  -GB    Lift-Off Test (Subscapularis Lesion) Left:;Unable to test  -GB    Belly Press (Subscapularis Lesion) Left:;Negative  -GB    Speed's Test (LH of Biceps Lesion) Left:;Negative  -GB    Biceps/Labral Special Tests    Clunk Test (Labral Test) Left:;Positive  -GB    Stearns's Test (Labral Test) Left:;Positive  -GB    ROM (Range of Motion)    General ROM upper extremity range of motion deficits identified  -GB    Left Shoulder    Flexion AROM Deficit 105   Pain begins at 95  -GB    Extension AROM Deficit 50  -GB    ABduction AROM Deficit 85   Pain begins at 75  -GB    External Rotation AROM Deficit 28  -GB    Internal Rotation AROM Deficit L2  -GB    General UE Assessment    ROM RUE ROM was WFL;shoulder, left: UE ROM deficit  -GB    MMT (Manual Muscle Testing)    General MMT Assessment upper extremity strength deficits identified  -GB    Left Shoulder    Flexion Gross Movement (3-/5) fair minus   with pain  -GB    Extension Gross Movement (4/5) good  -GB    ABduction Gross Movement (2+/5) poor plus   with pain  -GB    Int Rotation Gross Movement (3-/5) fair minus  -GB     Ext Rotation Gross Movement (2+/5) poor plus   with pain  -GB    Upper Extremity    Upper Ext Manual Muscle Testing right UE strength is WFL;left shoulder strength deficit;right elbow/forearm strength deficit  -GB    Right Elbow/Forearm    Elbow Flexion Gross Movement (4/5) good   with pain  -GB    Elbow Extension Gross Movement (4-/5) good minus  -GB      User Key  (r) = Recorded By, (t) = Taken By, (c) = Cosigned By    Initials Name Provider Type    STEVEN Shore, PT Physical Therapist                            Therapy Education       12/06/17 0937          Therapy Education    Education Details Blue tband issued;  rows, low rows, wall slide ups, isometric shoulder IR, ER and Pendulums  -GB      Given HEP  -GB      Program New  -GB      How Provided Verbal;Demonstration;Written  -GB      Provided to Patient  -GB      Level of Understanding Verbalized;Demonstrated  -GB        User Key  (r) = Recorded By, (t) = Taken By, (c) = Cosigned By    Initials Name Provider Type    STEVEN Shore, PT Physical Therapist                PT OP Goals       12/06/17 0900       PT Short Term Goals    STG Date to Achieve 12/20/17  -GB     STG 1 Patient is independent with HEP for ROM and strengthening.  -GB     STG 1 Progress New  -GB     STG 2 Pain is reduced by 25% with frequency or intensity of symptomsl  -GB     STG 2 Progress New  -GB     Long Term Goals    LTG Date to Achieve 03/02/18  -GB     LTG 1 Patient is independent with HEP for progressive strengthening and stabilization.  -GB     LTG 1 Progress New  -GB     LTG 2 QuickDash score is improved by at least 20%.  -GB     LTG 2 Progress New  -GB     LTG 3 Left shoulder AROM is improved by 15 degrees with Forward flexion and 2 levels with Fxn IR, to improve self care function and ability to dress.  -GB     LTG 3 Progress New  -GB     LTG 4 Pain is reduced by 50% with frequency or intensity of symptoms.  -GB     LTG 4 Progress New  -GB     Time Calculation     PT Goal Re-Cert Due Date 03/02/18  -GB       User Key  (r) = Recorded By, (t) = Taken By, (c) = Cosigned By    Initials Name Provider Type    STEVEN Shore, PT Physical Therapist                PT Assessment/Plan       12/06/17 0943       PT Assessment    Functional Limitations Limitation in home management;Performance in self-care ADL;Performance in leisure activities;Limitations in functional capacity and performance;Performance in work activities;Limitations in community activities  -GB     Impairments Endurance;Range of motion;Pain;Muscle strength;Joint integrity;Joint mobility;Poor body mechanics  -GB     Assessment Comments Patient has findings consistent with diagnosis.  He has mixed signs that would suggest Rotator cuff problem, possible labral injury and Bursitis in addition to normal degenerative changes likely present.  Focus will initially be on core and scapular stabilization while working with ROM.  -     Please refer to paper survey for additional self-reported information Yes  -GB     Rehab Potential Fair  -GB     Patient/caregiver participated in establishment of treatment plan and goals Yes  -GB     Patient would benefit from skilled therapy intervention Yes  -GB     PT Plan    PT Frequency 2x/week  -GB     Predicted Duration of Therapy Intervention (days/wks) 8 weeks (then reduce to 1x week x 4 weeks)  -GB     Planned CPT's? PT EVAL LOW COMPLEXITY: 66311;PT RE-EVAL: 40287;PT NEUROMUSC RE-EDUCATION EA 15 MIN: 15430;PT MANUAL THERAPY EA 15 MIN: 13948;PT THER PROC EA 15 MIN: 91862;PT ELECTRICAL STIM UNATTEND: ;PT ULTRASOUND EA 15 MIN: 33261;PT HOT OR COLD PACK TREAT MCARE  -GB     PT Plan Comments Progress with ther ex.  -GB       User Key  (r) = Recorded By, (t) = Taken By, (c) = Cosigned By    Initials Name Provider Type    STEVEN Shore, PT Physical Therapist                  Exercises       12/06/17 0900          Exercise 1    Exercise Name 1 Rows  -GB      Sets 1 2   -GB      Reps 1 10  -GB      Additional Comments Blue t-band  -GB      Exercise 2    Exercise Name 2 Low Rows  -GB      Sets 2 2  -GB      Reps 2 10  -GB      Additional Comments Blue T-band  -GB      Exercise 3    Exercise Name 3 Wall push ups with protraction  -GB      Reps 3 10  -GB      Exercise 4    Exercise Name 4 Wall slide ups with hands together  -GB      Reps 4 10  -GB      Exercise 5    Exercise Name 5 Isometric shoulder IR  -GB      Reps 5 5  -GB      Time (Seconds) 5 3  -GB      Additional Comments performed (B)  -GB      Exercise 6    Exercise Name 6 Isometric shoulder ER  -GB      Reps 6 5  -GB      Time (Seconds) 6 3  -GB      Additional Comments performed (B)  -GB      Exercise 7    Exercise Name 7 Pendulums  -GB      Reps 7 15  -GB        User Key  (r) = Recorded By, (t) = Taken By, (c) = Cosigned By    Initials Name Provider Type    STEVEN Shore PT Physical Therapist                              Outcome Measures       12/06/17 0900          Functional Assessment    Outcome Measure Options Quick DASH  -GB        User Key  (r) = Recorded By, (t) = Taken By, (c) = Cosigned By    Initials Name Provider Type    STEVEN Shore, PT Physical Therapist            Time Calculation:   Start Time: 0745  Total Timed Code Minutes- PT: 20 minute(s)     Therapy Charges for Today     Code Description Service Date Service Provider Modifiers Qty    67246881361 HC PT CARRY MOV HAND OBJ CURRENT 12/6/2017 Jose Daniel Shore, PT GP, CL 1    56924785773 HC PT CARRY MOV HAND OBJ PROJECTED 12/6/2017 Jose Daniel Shore, PT GP, CJ 1    22013854348 HC PT THER PROC EA 15 MIN 12/6/2017 Jose Daniel Shore, PT GP 1    27464752571 HC PT EVAL LOW COMPLEXITY 3 12/6/2017 Jose Daniel Shore, PT GP 1          PT G-Codes  PT Professional Judgement Used?: Yes  Outcome Measure Options: Quick DASH  Functional Limitation: Carrying, moving and handling objects  Carrying, Moving and Handling Objects Current Status  (): At least 60 percent but less than 80 percent impaired, limited or restricted  Carrying, Moving and Handling Objects Goal Status (): At least 20 percent but less than 40 percent impaired, limited or restricted         Jose Daniel Shore, PT  12/6/2017

## 2017-12-13 ENCOUNTER — HOSPITAL ENCOUNTER (OUTPATIENT)
Dept: PHYSICAL THERAPY | Facility: HOSPITAL | Age: 82
Setting detail: THERAPIES SERIES
Discharge: HOME | End: 2017-12-13

## 2017-12-13 DIAGNOSIS — M25.512 CHRONIC LEFT SHOULDER PAIN: Primary | ICD-10-CM

## 2017-12-13 DIAGNOSIS — G89.29 CHRONIC LEFT SHOULDER PAIN: Primary | ICD-10-CM

## 2017-12-13 PROCEDURE — 97110 THERAPEUTIC EXERCISES: CPT

## 2017-12-13 NOTE — THERAPY TREATMENT NOTE
Outpatient Physical Therapy Ortho Treatment Note   Linda     Patient Name: Iván Lara  : 1935  MRN: [id number]  Today's Date: 2017      Visit Date: 2017    Visit Dx:    ICD-10-CM ICD-9-CM   1. Chronic left shoulder pain M25.512 719.41    G89.29 338.29       Patient Active Problem List   Diagnosis   • Paroxysmal atrial fibrillation   • Basal cell carcinoma of skin   • Impotence of organic origin   • Gastroesophageal reflux disease   • Hyperlipidemia   • Hypertension   • Persistent insomnia   • Knee pain   • Osteoarthritis of lumbar spine   • Adiposity   • Overactive bladder   • Arthralgia of hip   • Impaired glucose tolerance   • Prostatism   • Pulmonary embolism   • Inflammation of sacroiliac joint   • Generalized osteoarthritis   • Preventative health care   • Vertigo   • TIA (transient ischemic attack)   • Lymphocytic colitis   • Esophageal stricture   • Left shoulder pain        Past Medical History:   Diagnosis Date   • Basal cell carcinoma    • Blood loss     post op   • ED (erectile dysfunction)     Responsive to Viagra   • Failed total knee, right    • Fasting hypoglycemia     Hemoglobin A1c normal   • Generalized osteoarthritis    • GERD (gastroesophageal reflux disease)     History esophageal stricture   • History of blood clots    • HNP (herniated nucleus pulposus), lumbar ,     L5 L6   • Hyperglycemia    • Hyperlipidemia    • Hypertension    • Microscopic colitis     Positive biopsy - colonoscopy   • OAB (overactive bladder)     Persistent urgency   • Obesity Adulthood     body weight 244 BMI 32   • Painful total knee replacement    • Paroxysmal atrial fibrillation     one episode - cardioverted   • Prostatism    • Pulmonary embolism     after injury and protracted immobilization   • Right knee DJD     Partial knee arthroplasty   • Shoulder dislocation     Repeated episodes   • Sleep disturbances     Frequently requires medication    • Transient cerebral ischemia 2010, 2016    Negative medical workup on both occasions   • Venous stasis    • Vertigo         Past Surgical History:   Procedure Laterality Date   • CARDIOVERSION  1991    Atrial fibrillation   • KNEE ARTHROPLASTY Right 2015    Partial    • KNEE ARTHROSCOPY Right 2015    Failed procedure   • LUMBAR DISC SURGERY  2014    Surgery ×2 Ruptured disc   • LUMBAR DISCECTOMY  1988    L5 L6                              PT Assessment/Plan       12/13/17 0835       PT Assessment    Assessment Comments Patient had apparently been having pain because of performing slide ups with only left and with using too much force.  Cues provided and exercise performed properly under supervision.  -GB     PT Plan    PT Plan Comments Continuewith care.  -GB       User Key  (r) = Recorded By, (t) = Taken By, (c) = Cosigned By    Initials Name Provider Type    GB Jose Daniel Shore, PT Physical Therapist                    Exercises       12/13/17 0800          Subjective Comments    Subjective Comments Patient reports that he slept on his shoulder which increased pain.  The only exercise that bothered his was wall slides.   -GB      Subjective Pain    Able to rate subjective pain? yes  -GB      Pre-Treatment Pain Level 4  -GB      Post-Treatment Pain Level 4  -GB      Exercise 1    Exercise Name 1 NuStep at level 4  -GB      Time (Minutes) 1 5  -GB      Exercise 2    Exercise Name 2 Rows  -GB      Sets 2 2  -GB      Reps 2 10  -GB      Additional Comments 2 plates  -GB      Exercise 3    Exercise Name 3 Low Rows  -GB      Sets 3 2  -GB      Reps 3 10  -GB      Additional Comments Low Rows  -GB      Exercise 4    Exercise Name 4 Seated roll outs  -GB      Reps 4 10  -GB      Time (Seconds) 4 3-4  -GB      Exercise 5    Exercise Name 5 Multidirectional isometrics with green t-band  -GB      Reps 5 5  -GB      Time (Seconds) 5 3-4  -GB      Exercise 6    Exercise Name 6 Walk outs  -GB      Reps 6 5  -GB       Additional Comments Green t-band:  flexion, extension, abduction  -GB      Exercise 7    Exercise Name 7 Wall slide ups  -GB      Reps 7 5  -GB      Additional Comments 2 handed  -GB      Exercise 8    Exercise Name 8 Pendulums  -GB      Sets 8 2  -GB      Reps 8 15  -GB      Additional Comments 2 lbs  -GB      Exercise 9    Exercise Name 9 PROM with PT assist from supine  -GB      Time (Minutes) 9 8-9  -GB        User Key  (r) = Recorded By, (t) = Taken By, (c) = Cosigned By    Initials Name Provider Type    STEVEN Shore, MYRANDA Physical Therapist                        Manual Rx (last 36 hours)      Manual Treatments       12/13/17 0700          Manual Rx 1    Manual Rx 1 Location left gh  -GB      Manual Rx 1 Type gh massage   -GB      Manual Rx 1 Duration 4-5 min  -GB        User Key  (r) = Recorded By, (t) = Taken By, (c) = Cosigned By    Initials Name Provider Type    STEVEN Shore, MYRANDA Physical Therapist                   Outcome Measure Options: Quick DASH  Quick DASH  Open a tight or new jar.: Mild Difficulty  Do heavy household chores (e.g., wash walls, wash floors): Moderate Difficulty  Carry a shopping bag or briefcase: Mild Difficulty  Wash your back: Severe Difficulty  Use a knife to cut food: Mild Difficulty  Recreational activities in which you take some force or impact through your arm, should or hand (e.g. golf, hammering, tennis, etc.): Unable  During the past week, to what extent has your arm, shoulder, or hand problem interfered with your normal social activites with family, friends, neighbors or groups?: Moderately  During the past week, were you limited in your work or other regular daily activities as a result of your arm, shoulder or hand problem?: Slightly Limited  Arm, Shoulder, or hand pain: Moderate  Tingling (pins and needles) in your arm, shoulder, or hand: None  During the past week, how much difficulty have you had sleeping because of the pain in your arm, shoulder or  hand?: Moderate Difficiculty  Number of Questions Answered: 11  Quick DASH Score: 43.18         Time Calculation:   Start Time: 0755  Total Timed Code Minutes- PT: 32 minute(s)    Therapy Charges for Today     Code Description Service Date Service Provider Modifiers Qty    94000944483 HC PT THER PROC EA 15 MIN 12/13/2017 Jose Daniel Shore, PT GP 2          PT G-Codes  Outcome Measure Options: Quick DASH         Jose Daniel Shore, PT  12/13/2017

## 2017-12-15 ENCOUNTER — HOSPITAL ENCOUNTER (OUTPATIENT)
Dept: PHYSICAL THERAPY | Facility: HOSPITAL | Age: 82
Setting detail: THERAPIES SERIES
Discharge: HOME | End: 2017-12-15

## 2017-12-15 DIAGNOSIS — G89.29 CHRONIC LEFT SHOULDER PAIN: Primary | ICD-10-CM

## 2017-12-15 DIAGNOSIS — M25.512 CHRONIC LEFT SHOULDER PAIN: Primary | ICD-10-CM

## 2017-12-15 PROCEDURE — 97110 THERAPEUTIC EXERCISES: CPT

## 2017-12-15 NOTE — THERAPY TREATMENT NOTE
Outpatient Physical Therapy Ortho Treatment Note   Linda     Patient Name: Iván Lara  : 1935  MRN: [id number]  Today's Date: 12/15/2017      Visit Date: 12/15/2017    Visit Dx:    ICD-10-CM ICD-9-CM   1. Chronic left shoulder pain M25.512 719.41    G89.29 338.29       Patient Active Problem List   Diagnosis   • Paroxysmal atrial fibrillation   • Basal cell carcinoma of skin   • Impotence of organic origin   • Gastroesophageal reflux disease   • Hyperlipidemia   • Hypertension   • Persistent insomnia   • Knee pain   • Osteoarthritis of lumbar spine   • Adiposity   • Overactive bladder   • Arthralgia of hip   • Impaired glucose tolerance   • Prostatism   • Pulmonary embolism   • Inflammation of sacroiliac joint   • Generalized osteoarthritis   • Preventative health care   • Vertigo   • TIA (transient ischemic attack)   • Lymphocytic colitis   • Esophageal stricture   • Left shoulder pain        Past Medical History:   Diagnosis Date   • Basal cell carcinoma    • Blood loss     post op   • ED (erectile dysfunction)     Responsive to Viagra   • Failed total knee, right    • Fasting hypoglycemia     Hemoglobin A1c normal   • Generalized osteoarthritis    • GERD (gastroesophageal reflux disease)     History esophageal stricture   • History of blood clots    • HNP (herniated nucleus pulposus), lumbar ,     L5 L6   • Hyperglycemia    • Hyperlipidemia    • Hypertension    • Microscopic colitis     Positive biopsy - colonoscopy   • OAB (overactive bladder)     Persistent urgency   • Obesity Adulthood     body weight 244 BMI 32   • Painful total knee replacement    • Paroxysmal atrial fibrillation     one episode - cardioverted   • Prostatism    • Pulmonary embolism     after injury and protracted immobilization   • Right knee DJD     Partial knee arthroplasty   • Shoulder dislocation     Repeated episodes   • Sleep disturbances     Frequently requires medication    • Transient cerebral ischemia 2010, 2016    Negative medical workup on both occasions   • Venous stasis    • Vertigo         Past Surgical History:   Procedure Laterality Date   • CARDIOVERSION  1991    Atrial fibrillation   • KNEE ARTHROPLASTY Right 2015    Partial    • KNEE ARTHROSCOPY Right 2015    Failed procedure   • LUMBAR DISC SURGERY  2014    Surgery ×2 Ruptured disc   • LUMBAR DISCECTOMY  1988    L5 L6                              PT Assessment/Plan       12/15/17 0934       PT Assessment    Assessment Comments Patient presents with improved motion and slight decreased pain in performance of exercises.  -GB     PT Plan    PT Plan Comments Follow up with care.  -GB       User Key  (r) = Recorded By, (t) = Taken By, (c) = Cosigned By    Initials Name Provider Type    GB Jose Daniel Shore, PT Physical Therapist                    Exercises       12/15/17 0900          Subjective Comments    Subjective Comments Patient reports that his pain is about the same.  He feels better with stretching being performed with HEP.  -GB      Subjective Pain    Able to rate subjective pain? yes  -GB      Pre-Treatment Pain Level 4  -GB      Post-Treatment Pain Level 3  -GB      Exercise 1    Exercise Name 1 NuStep at level 4  -GB      Time (Minutes) 1 7  -GB      Exercise 2    Exercise Name 2 Rows  -GB      Sets 2 3  -GB      Reps 2 10  -GB      Additional Comments 2 plates  -GB      Exercise 3    Exercise Name 3 Low Rows  -GB      Sets 3 3  -GB      Reps 3 10  -GB      Additional Comments 2 plates  -GB      Exercise 4    Exercise Name 4 Sternal lifts  -GB      Reps 4 10  -GB      Additional Comments Red T-band  -GB      Exercise 5    Exercise Name 5 Finger ladder  -GB      Reps 5 10  -GB      Exercise 6    Exercise Name 6 Walk outs  -GB      Reps 6 5  -GB      Additional Comments Yellow t-band:  flexion, extension, abduction  -GB      Exercise 7    Exercise Name 7 Slide ups  -GB      Reps 7 10  -GB      Exercise 8     Exercise Name 8 Roll outs  -GB      Reps 8 10  -GB      Exercise 9    Exercise Name 9 Pendulums  -GB      Sets 9 2  -GB      Reps 9 15  -GB        User Key  (r) = Recorded By, (t) = Taken By, (c) = Cosigned By    Initials Name Provider Type    STEVEN Shore, PT Physical Therapist                             Therapy Education  Given: HEP  Program: Modified (2 handed wall slide ups modified to finger ladder type wall walks)              Time Calculation:   Start Time: 0845  Total Timed Code Minutes- PT: 33 minute(s)    Therapy Charges for Today     Code Description Service Date Service Provider Modifiers Qty    63580018615 HC PT THER PROC EA 15 MIN 12/15/2017 Jose Daniel Shore, PT GP 2                    Jose Daniel Shore, PT  12/15/2017

## 2017-12-18 ENCOUNTER — HOSPITAL ENCOUNTER (OUTPATIENT)
Dept: PHYSICAL THERAPY | Facility: HOSPITAL | Age: 82
Setting detail: THERAPIES SERIES
Discharge: HOME | End: 2017-12-18

## 2017-12-18 DIAGNOSIS — M25.512 CHRONIC LEFT SHOULDER PAIN: Primary | ICD-10-CM

## 2017-12-18 DIAGNOSIS — G89.29 CHRONIC LEFT SHOULDER PAIN: Primary | ICD-10-CM

## 2017-12-18 PROCEDURE — 97110 THERAPEUTIC EXERCISES: CPT

## 2017-12-18 NOTE — THERAPY TREATMENT NOTE
Outpatient Physical Therapy Ortho Treatment Note   Linda     Patient Name: Iván Lara  : 1935  MRN: [id number]  Today's Date: 2017      Visit Date: 2017    Visit Dx:    ICD-10-CM ICD-9-CM   1. Chronic left shoulder pain M25.512 719.41    G89.29 338.29       Patient Active Problem List   Diagnosis   • Paroxysmal atrial fibrillation   • Basal cell carcinoma of skin   • Impotence of organic origin   • Gastroesophageal reflux disease   • Hyperlipidemia   • Hypertension   • Persistent insomnia   • Knee pain   • Osteoarthritis of lumbar spine   • Adiposity   • Overactive bladder   • Arthralgia of hip   • Impaired glucose tolerance   • Prostatism   • Pulmonary embolism   • Inflammation of sacroiliac joint   • Generalized osteoarthritis   • Preventative health care   • Vertigo   • TIA (transient ischemic attack)   • Lymphocytic colitis   • Esophageal stricture   • Left shoulder pain        Past Medical History:   Diagnosis Date   • Basal cell carcinoma    • Blood loss     post op   • ED (erectile dysfunction)     Responsive to Viagra   • Failed total knee, right    • Fasting hypoglycemia     Hemoglobin A1c normal   • Generalized osteoarthritis    • GERD (gastroesophageal reflux disease)     History esophageal stricture   • History of blood clots    • HNP (herniated nucleus pulposus), lumbar ,     L5 L6   • Hyperglycemia    • Hyperlipidemia    • Hypertension    • Microscopic colitis     Positive biopsy - colonoscopy   • OAB (overactive bladder)     Persistent urgency   • Obesity Adulthood     body weight 244 BMI 32   • Painful total knee replacement    • Paroxysmal atrial fibrillation     one episode - cardioverted   • Prostatism    • Pulmonary embolism     after injury and protracted immobilization   • Right knee DJD     Partial knee arthroplasty   • Shoulder dislocation     Repeated episodes   • Sleep disturbances     Frequently requires medication    • Transient cerebral ischemia 2010, 2016    Negative medical workup on both occasions   • Venous stasis    • Vertigo         Past Surgical History:   Procedure Laterality Date   • CARDIOVERSION  1991    Atrial fibrillation   • KNEE ARTHROPLASTY Right 2015    Partial    • KNEE ARTHROSCOPY Right 2015    Failed procedure   • LUMBAR DISC SURGERY  2014    Surgery ×2 Ruptured disc   • LUMBAR DISCECTOMY  1988    L5 L6                              PT Assessment/Plan       12/18/17 0845       PT Assessment    Assessment Comments Patient is with increased tolerance to end range of motion.  He has improved ability to stay relaxed allowing for improved AA/PROM.  -GB     PT Plan    PT Plan Comments Progress with ther ex to work with core stability  -GB       User Key  (r) = Recorded By, (t) = Taken By, (c) = Cosigned By    Initials Name Provider Type    STEVEN Shore, PT Physical Therapist                    Exercises       12/18/17 0800          Subjective Comments    Subjective Comments Patient reports that he is generally sore over entire shoulder.  -GB      Subjective Pain    Able to rate subjective pain? yes  -GB      Pre-Treatment Pain Level 4  -GB      Post-Treatment Pain Level 3  -GB      Exercise 1    Exercise Name 1 NuStep at level 4  -GB      Time (Minutes) 1 5  -GB      Exercise 2    Exercise Name 2 Rows  -GB      Reps 2 10  -GB      Additional Comments 2 plates  -GB      Exercise 3    Exercise Name 3 Low Rows  -GB      Reps 3 10  -GB      Additional Comments 2 plates  -GB      Exercise 4    Exercise Name 4 Seated gym ball roll outs  -GB      Sets 4 4  -GB      Reps 4 10  -GB      Exercise 5    Exercise Name 5 Finger ladder  -GB      Reps 5 10  -GB      Exercise 6    Exercise Name 6 Walk outs  -GB      Reps 6 5  -GB      Additional Comments Green t-band  -GB      Exercise 7    Exercise Name 7 Curls  -GB      Reps 7 15  -GB      Additional Comments Green t-band  -GB      Exercise 8    Exercise Name 8  Shoulder IR/ER  -GB      Reps 8 10  -GB      Additional Comments Yellow t-band; each arm  -GB      Exercise 9    Exercise Name 9 ER - door stretch  -GB      Reps 9 5  -GB      Time (Seconds) 9 10  -GB      Exercise 10    Exercise Name 10 Shoulder IR/ER isometrics  -GB      Reps 10 5  -GB      Additional Comments each performed (B) in neutral  -GB      Exercise 11    Exercise Name 11 Wall push ups  -GB      Reps 11 10  -GB      Exercise 12    Exercise Name 12 Table slides - forward flexion  -GB      Reps 12 5  -GB      Time (Seconds) 12 10  -GB      Exercise 13    Exercise Name 13 Pendulums  -GB      Sets 13 3  -GB      Reps 13 15  -GB        User Key  (r) = Recorded By, (t) = Taken By, (c) = Cosigned By    Initials Name Provider Type    GB Jose Daniel Shore, PT Physical Therapist                                            Time Calculation:   Start Time: 0754  Total Timed Code Minutes- PT: 40 minute(s)    Therapy Charges for Today     Code Description Service Date Service Provider Modifiers Qty    34053985799 HC PT THER PROC EA 15 MIN 12/18/2017 Jose Daniel Shore, PT GP 3                    Jose Daniel Shore, PT  12/18/2017

## 2017-12-20 ENCOUNTER — HOSPITAL ENCOUNTER (OUTPATIENT)
Dept: PHYSICAL THERAPY | Facility: HOSPITAL | Age: 82
Setting detail: THERAPIES SERIES
Discharge: HOME | End: 2017-12-20

## 2017-12-20 DIAGNOSIS — G89.29 CHRONIC LEFT SHOULDER PAIN: Primary | ICD-10-CM

## 2017-12-20 DIAGNOSIS — M25.512 CHRONIC LEFT SHOULDER PAIN: Primary | ICD-10-CM

## 2017-12-20 PROCEDURE — 97110 THERAPEUTIC EXERCISES: CPT

## 2017-12-20 NOTE — THERAPY TREATMENT NOTE
Outpatient Physical Therapy Ortho Treatment Note   Linda     Patient Name: Iván Lara  : 1935  MRN: [id number]  Today's Date: 2017      Visit Date: 2017    Visit Dx:    ICD-10-CM ICD-9-CM   1. Chronic left shoulder pain M25.512 719.41    G89.29 338.29       Patient Active Problem List   Diagnosis   • Paroxysmal atrial fibrillation   • Basal cell carcinoma of skin   • Impotence of organic origin   • Gastroesophageal reflux disease   • Hyperlipidemia   • Hypertension   • Persistent insomnia   • Knee pain   • Osteoarthritis of lumbar spine   • Adiposity   • Overactive bladder   • Arthralgia of hip   • Impaired glucose tolerance   • Prostatism   • Pulmonary embolism   • Inflammation of sacroiliac joint   • Generalized osteoarthritis   • Preventative health care   • Vertigo   • TIA (transient ischemic attack)   • Lymphocytic colitis   • Esophageal stricture   • Left shoulder pain        Past Medical History:   Diagnosis Date   • Basal cell carcinoma    • Blood loss     post op   • ED (erectile dysfunction)     Responsive to Viagra   • Failed total knee, right    • Fasting hypoglycemia     Hemoglobin A1c normal   • Generalized osteoarthritis    • GERD (gastroesophageal reflux disease)     History esophageal stricture   • History of blood clots    • HNP (herniated nucleus pulposus), lumbar ,     L5 L6   • Hyperglycemia    • Hyperlipidemia    • Hypertension    • Microscopic colitis     Positive biopsy - colonoscopy   • OAB (overactive bladder)     Persistent urgency   • Obesity Adulthood     body weight 244 BMI 32   • Painful total knee replacement    • Paroxysmal atrial fibrillation     one episode - cardioverted   • Prostatism    • Pulmonary embolism     after injury and protracted immobilization   • Right knee DJD     Partial knee arthroplasty   • Shoulder dislocation     Repeated episodes   • Sleep disturbances     Frequently requires medication    • Transient cerebral ischemia 2010, 2016    Negative medical workup on both occasions   • Venous stasis    • Vertigo         Past Surgical History:   Procedure Laterality Date   • CARDIOVERSION  1991    Atrial fibrillation   • KNEE ARTHROPLASTY Right 2015    Partial    • KNEE ARTHROSCOPY Right 2015    Failed procedure   • LUMBAR DISC SURGERY  2014    Surgery ×2 Ruptured disc   • LUMBAR DISCECTOMY  1988    L5 L6                              PT Assessment/Plan       12/20/17 1045       PT Assessment    Assessment Comments Patient does have mild discomfort with use of pulleys.  Due to recurrent soreness new exercises planned for upper core stability, deferred.  -GB     PT Plan    PT Plan Comments Follow up with care.  -GB       User Key  (r) = Recorded By, (t) = Taken By, (c) = Cosigned By    Initials Name Provider Type    STEVEN Shore, PT Physical Therapist                    Exercises       12/20/17 1000          Subjective Comments    Subjective Comments Patient has some issues when returning shoulder to neutral from pulleys.  -GB      Subjective Pain    Able to rate subjective pain? yes  -GB      Pre-Treatment Pain Level 4  -GB      Post-Treatment Pain Level 4  -GB      Exercise 1    Exercise Name 1 NuStep at level 4  -GB      Time (Minutes) 1 7  -GB      Exercise 2    Exercise Name 2 Rows  -GB      Reps 2 10  -GB      Additional Comments 2 plates with sustained hold and slow eccentric release  -GB      Exercise 3    Exercise Name 3 Low Rows  -GB      Reps 3 10  -GB      Additional Comments 2 plates with sustained hold and slow eccentric release  -GB      Exercise 4    Exercise Name 4 Seated gym ball roll outs  -GB      Reps 4 15  -GB      Exercise 5    Exercise Name 5 Finger ladder  -GB      Reps 5 15  -GB      Exercise 6    Exercise Name 6 Pulleys in flexion  -GB      Time (Minutes) 6 2  -GB      Exercise 7    Exercise Name 7 Table slides  -GB      Time (Minutes) 7 2  -GB      Exercise 8    Exercise  Name 8 Shoulder IR/ER isometrics  -GB      Reps 8 5  -GB      Exercise 9    Exercise Name 9 ER - door stretch  -GB      Reps 9 10  -GB      Exercise 10    Exercise Name 10 Pendulums  -GB      Sets 10 3  -GB      Reps 10 15  -GB      Additional Comments performed after every 3rd/4th exercise  -GB        User Key  (r) = Recorded By, (t) = Taken By, (c) = Cosigned By    Initials Name Provider Type    GB Jose Daniel Shore, PT Physical Therapist                                            Time Calculation:   Start Time: 0840  Total Timed Code Minutes- PT: 40 minute(s)    Therapy Charges for Today     Code Description Service Date Service Provider Modifiers Qty    33871292078 HC PT THER PROC EA 15 MIN 12/20/2017 Jose Daniel Shore, PT GP 3                    Jose Daniel Shore, PT  12/20/2017

## 2017-12-28 ENCOUNTER — HOSPITAL ENCOUNTER (OUTPATIENT)
Dept: PHYSICAL THERAPY | Facility: HOSPITAL | Age: 82
Setting detail: THERAPIES SERIES
Discharge: HOME | End: 2017-12-28

## 2017-12-28 DIAGNOSIS — M25.512 CHRONIC LEFT SHOULDER PAIN: Primary | ICD-10-CM

## 2017-12-28 DIAGNOSIS — G89.29 CHRONIC LEFT SHOULDER PAIN: Primary | ICD-10-CM

## 2017-12-28 PROCEDURE — 97110 THERAPEUTIC EXERCISES: CPT

## 2017-12-28 NOTE — THERAPY TREATMENT NOTE
Outpatient Physical Therapy Ortho Treatment Note   Linda     Patient Name: Iván Lara  : 1935  MRN: [id number]  Today's Date: 2017      Visit Date: 2017    Visit Dx:    ICD-10-CM ICD-9-CM   1. Chronic left shoulder pain M25.512 719.41    G89.29 338.29       Patient Active Problem List   Diagnosis   • Paroxysmal atrial fibrillation   • Basal cell carcinoma of skin   • Impotence of organic origin   • Gastroesophageal reflux disease   • Hyperlipidemia   • Hypertension   • Persistent insomnia   • Knee pain   • Osteoarthritis of lumbar spine   • Adiposity   • Overactive bladder   • Arthralgia of hip   • Impaired glucose tolerance   • Prostatism   • Pulmonary embolism   • Inflammation of sacroiliac joint   • Generalized osteoarthritis   • Preventative health care   • Vertigo   • TIA (transient ischemic attack)   • Lymphocytic colitis   • Esophageal stricture   • Left shoulder pain        Past Medical History:   Diagnosis Date   • Basal cell carcinoma    • Blood loss     post op   • ED (erectile dysfunction)     Responsive to Viagra   • Failed total knee, right    • Fasting hypoglycemia     Hemoglobin A1c normal   • Generalized osteoarthritis    • GERD (gastroesophageal reflux disease)     History esophageal stricture   • History of blood clots    • HNP (herniated nucleus pulposus), lumbar ,     L5 L6   • Hyperglycemia    • Hyperlipidemia    • Hypertension    • Microscopic colitis     Positive biopsy - colonoscopy   • OAB (overactive bladder)     Persistent urgency   • Obesity Adulthood     body weight 244 BMI 32   • Painful total knee replacement    • Paroxysmal atrial fibrillation     one episode - cardioverted   • Prostatism    • Pulmonary embolism     after injury and protracted immobilization   • Right knee DJD     Partial knee arthroplasty   • Shoulder dislocation     Repeated episodes   • Sleep disturbances     Frequently requires medication    • Transient cerebral ischemia 2010, 2016    Negative medical workup on both occasions   • Venous stasis    • Vertigo         Past Surgical History:   Procedure Laterality Date   • CARDIOVERSION  1991    Atrial fibrillation   • KNEE ARTHROPLASTY Right 2015    Partial    • KNEE ARTHROSCOPY Right 2015    Failed procedure   • LUMBAR DISC SURGERY  2014    Surgery ×2 Ruptured disc   • LUMBAR DISCECTOMY  1988    L5 L6                              PT Assessment/Plan       12/28/17 1055       PT Assessment    Assessment Comments ROM seems to have improved slightly, but patient is still with pain which causes disturbed sleep.  -GB     PT Plan    PT Plan Comments Reassess at next visit and consider Ortho consultation.  -GB       User Key  (r) = Recorded By, (t) = Taken By, (c) = Cosigned By    Initials Name Provider Type    GB Jose Daniel Shore, PT Physical Therapist                    Exercises       12/28/17 1000          Subjective Comments    Subjective Comments Patient is frustrated with ongoing pain and lack of sleep.  He has struggled with sleep for multiple days in a row, awaking with more pain in left shoulder.  -GB      Subjective Pain    Able to rate subjective pain? yes  -GB      Pre-Treatment Pain Level 3  -GB      Post-Treatment Pain Level 3  -GB      Exercise 1    Exercise Name 1 NuStep at level 5  -GB      Time (Minutes) 1 5  -GB      Exercise 2    Exercise Name 2 Shoulder IR  -GB      Reps 2 10  -GB      Additional Comments Red T-band  -GB      Exercise 3    Exercise Name 3 Shoulder ER  -GB      Reps 3 10  -GB      Additional Comments Red T-band  -GB      Exercise 4    Exercise Name 4 Shoulder sweeps  -GB      Sets 4 2  -GB      Reps 4 10  -GB      Additional Comments Red t-band  -GB      Exercise 5    Exercise Name 5 Lateral ball tosses  -GB      Sets 5 2  -GB      Reps 5 10  -GB      Additional Comments 1 lb medicine ball  -GB      Exercise 6    Exercise Name 6 Weighted Pull up Hangs  -GB      Reps 6 10   -GB      Additional Comments 25 lbs with pull down bar  -GB      Exercise 7    Exercise Name 7 Seated roll outs  -GB      Reps 7 20  -GB      Additional Comments Lg gym ball  -GB      Exercise 8    Exercise Name 8 Saws/Crosses  -GB      Reps 8 20  -GB      Additional Comments 2 lb weight  -GB      Exercise 9    Exercise Name 9 PROM and end range stretching elbow flexion, extension shoulder flexion, ER and IR  -GB      Time (Minutes) 9 10  -GB        User Key  (r) = Recorded By, (t) = Taken By, (c) = Cosigned By    Initials Name Provider Type    STEVEN Shore, PT Physical Therapist                        Manual Rx (last 36 hours)      Manual Treatments       12/28/17 0900          Manual Rx 1    Manual Rx 1 Location left gh  -GB      Manual Rx 1 Type gh massage   -GB      Manual Rx 1 Duration 2-3 min  -GB        User Key  (r) = Recorded By, (t) = Taken By, (c) = Cosigned By    Initials Name Provider Type    STEVEN Shore PT Physical Therapist                             Time Calculation:   Start Time: 0955  Total Timed Code Minutes- PT: 32 minute(s)    Therapy Charges for Today     Code Description Service Date Service Provider Modifiers Qty    08789358573  PT THER PROC EA 15 MIN 12/28/2017 Jose Daniel Shore, PT GP 2                    Jose Daniel Shore PT  12/28/2017

## 2018-01-02 RX ORDER — TAMSULOSIN HYDROCHLORIDE 0.4 MG/1
CAPSULE ORAL
Qty: 180 CAPSULE | Refills: 1 | Status: SHIPPED | OUTPATIENT
Start: 2018-01-02

## 2018-01-05 ENCOUNTER — HOSPITAL ENCOUNTER (OUTPATIENT)
Dept: PHYSICAL THERAPY | Facility: HOSPITAL | Age: 83
Setting detail: THERAPIES SERIES
Discharge: HOME | End: 2018-01-05

## 2018-01-05 DIAGNOSIS — G89.29 CHRONIC LEFT SHOULDER PAIN: Primary | ICD-10-CM

## 2018-01-05 DIAGNOSIS — M25.512 CHRONIC LEFT SHOULDER PAIN: Primary | ICD-10-CM

## 2018-01-05 PROCEDURE — G8985 CARRY GOAL STATUS: HCPCS

## 2018-01-05 PROCEDURE — G8984 CARRY CURRENT STATUS: HCPCS

## 2018-01-05 PROCEDURE — 97110 THERAPEUTIC EXERCISES: CPT

## 2018-01-05 PROCEDURE — G8986 CARRY D/C STATUS: HCPCS

## 2018-01-05 NOTE — THERAPY DISCHARGE NOTE
Outpatient Physical Therapy Ortho Re-Evaluation/Discharge   Linda     Patient Name: Iván Lara  : 1935  MRN: [id number]  Today's Date: 2018      Visit Date: 2018    Patient Active Problem List   Diagnosis   • Paroxysmal atrial fibrillation   • Basal cell carcinoma of skin   • Impotence of organic origin   • Gastroesophageal reflux disease   • Hyperlipidemia   • Hypertension   • Persistent insomnia   • Knee pain   • Osteoarthritis of lumbar spine   • Adiposity   • Overactive bladder   • Arthralgia of hip   • Impaired glucose tolerance   • Prostatism   • Pulmonary embolism   • Inflammation of sacroiliac joint   • Generalized osteoarthritis   • Preventative health care   • Vertigo   • TIA (transient ischemic attack)   • Lymphocytic colitis   • Esophageal stricture   • Left shoulder pain        Past Medical History:   Diagnosis Date   • Basal cell carcinoma    • Blood loss     post op   • ED (erectile dysfunction)     Responsive to Viagra   • Failed total knee, right    • Fasting hypoglycemia     Hemoglobin A1c normal   • Generalized osteoarthritis    • GERD (gastroesophageal reflux disease)     History esophageal stricture   • History of blood clots    • HNP (herniated nucleus pulposus), lumbar ,     L5 L6   • Hyperglycemia    • Hyperlipidemia    • Hypertension    • Microscopic colitis     Positive biopsy - colonoscopy   • OAB (overactive bladder) 2000    Persistent urgency   • Obesity Adulthood     body weight 244 BMI 32   • Painful total knee replacement    • Paroxysmal atrial fibrillation     one episode - cardioverted   • Prostatism    • Pulmonary embolism     after injury and protracted immobilization   • Right knee DJD     Partial knee arthroplasty   • Shoulder dislocation     Repeated episodes   • Sleep disturbances     Frequently requires medication   • Transient cerebral ischemia ,     Negative medical workup on both occasions   •  Venous stasis    • Vertigo         Past Surgical History:   Procedure Laterality Date   • CARDIOVERSION  1991    Atrial fibrillation   • KNEE ARTHROPLASTY Right 2015    Partial    • KNEE ARTHROSCOPY Right 2015    Failed procedure   • LUMBAR DISC SURGERY  2014    Surgery ×2 Ruptured disc   • LUMBAR DISCECTOMY  1988    L5 L6          Visit Dx:     ICD-10-CM ICD-9-CM   1. Chronic left shoulder pain M25.512 719.41    G89.29 338.29                 PT Ortho       01/05/18 0700    Subjective Pain    Pre-Treatment Pain Level 3  -GB    Post-Treatment Pain Level 3  -GB    Shoulder Impingement/Rotator Cuff Special Tests    Johnson-Richi Test (RC Lesion vs. Bursitis) Left:;Positive  -GB    Empty Can Test (RC Lesion) Left:;Positive  -GB    Biceps/Labral Special Tests    Hillister's Test (Labral Test) Left:;Positive  -GB    Left Shoulder    Flexion AROM Deficit 120  -GB    Extension AROM Deficit 55  -GB    ABduction AROM Deficit 85  -GB    External Rotation AROM Deficit 22  -GB    Internal Rotation AROM Deficit L4  -GB    MMT (Manual Muscle Testing)    General MMT Assessment Detail Pain increases with all planes of left shoulder MMT  -GB    Left Shoulder    Flexion Gross Movement (3-/5) fair minus  -GB    Extension Gross Movement (4/5) good  -GB    ABduction Gross Movement (2/5) poor  -GB    Int Rotation Gross Movement (2+/5) poor plus  -GB    Ext Rotation Gross Movement (2/5) poor  -GB    Right Elbow/Forearm    Elbow Flexion Gross Movement (4-/5) good minus   With pain  -GB    Elbow Extension Gross Movement (4-/5) good minus  -GB      User Key  (r) = Recorded By, (t) = Taken By, (c) = Cosigned By    Initials Name Provider Type    STEVEN Shore, PT Physical Therapist                       Therapy Education  Education Details: Review of HEP and POC;  He informs PT that he plans to progress care with Ortho consult  Given: Symptoms/condition management          PT OP Goals       01/05/18 0800       PT Short Term Goals     STG Date to Achieve 12/20/17  -GB     STG 1 Patient is independent with HEP for ROM and strengthening.  -GB     STG 1 Progress Met  -GB     STG 2 Pain is reduced by 25% with frequency or intensity of symptomsl  -GB     STG 2 Progress Not Met  -GB     Long Term Goals    LTG Date to Achieve 03/02/18  -GB     LTG 1 Patient is independent with HEP for progressive strengthening and stabilization.  -GB     LTG 1 Progress Partially Met  -GB     LTG 2 QuickDash score is improved by at least 20%.  -GB     LTG 2 Progress Not Met  -GB     LTG 3 Left shoulder AROM is improved by 15 degrees with Forward flexion and 2 levels with Fxn IR, to improve self care function and ability to dress.  -GB     LTG 3 Progress Not Met  -GB     LTG 4 Pain is reduced by 50% with frequency or intensity of symptoms.  -GB     LTG 4 Progress Not Met  -GB       User Key  (r) = Recorded By, (t) = Taken By, (c) = Cosigned By    Initials Name Provider Type    STEVEN Shore, PT Physical Therapist                PT Assessment/Plan       01/05/18 0757       PT Assessment    Functional Limitations Limitation in home management;Performance in self-care ADL;Performance in leisure activities;Limitations in functional capacity and performance;Performance in work activities;Limitations in community activities  -     Impairments Endurance;Range of motion;Pain;Muscle strength;Joint integrity;Joint mobility;Poor body mechanics  -GB     Assessment Comments ROM has improved with flexion, but no change measured with abduction, or either rotation.  He appears to have more pain with MMT today.  There is a slightly improved QuickDash score.  He seems more limited with sleep.  Due to combination of symptoms that suggest RC injury with Labral derficiency, further therapy with HEP will likely be as successful as that performed in clinic.  PT supports patient decision to progress POC to follow up with MD and pursue Ortho consult options or MRI prior to additional  therapy.  -GB     Please refer to paper survey for additional self-reported information Yes  -GB     Rehab Potential Fair  -GB     Patient/caregiver participated in establishment of treatment plan and goals Yes  -GB     Patient would benefit from skilled therapy intervention No  -GB     PT Plan    PT Plan Comments Dishcarge patient from OP PT services at this time.  -GB       User Key  (r) = Recorded By, (t) = Taken By, (c) = Cosigned By    Initials Name Provider Type    STEVEN Shore, PT Physical Therapist                Exercises       01/05/18 0700          Subjective Comments    Subjective Comments Patient does not feel much difference at shoulder.  He is most troubled when positioning and rolling in bed, disturbing his sleep.  -GB      Subjective Pain    Able to rate subjective pain? yes  -GB      Pre-Treatment Pain Level 3  -GB      Post-Treatment Pain Level 3  -GB      Exercise 1    Exercise Name 1 NuStep at level 5  -GB      Time (Minutes) 1 7  -GB        User Key  (r) = Recorded By, (t) = Taken By, (c) = Cosigned By    Initials Name Provider Type    STEVEN Shore, PT Physical Therapist                       Outcome Measure Options: Quick DASH  Quick DASH  Open a tight or new jar.: No Difficulty  Do heavy household chores (e.g., wash walls, wash floors): Mild Difficulty  Carry a shopping bag or briefcase: Mild Difficulty  Wash your back: Moderate Difficulty  Use a knife to cut food: No Difficulty  Recreational activities in which you take some force or impact through your arm, should or hand (e.g. golf, hammering, tennis, etc.): Moderate Difficulty  During the past week, to what extent has your arm, shoulder, or hand problem interfered with your normal social activites with family, friends, neighbors or groups?: Quite a bit  During the past week, were you limited in your work or other regular daily activities as a result of your arm, shoulder or hand problem?: Moderately Limited  Arm,  Shoulder, or hand pain: Moderate  Tingling (pins and needles) in your arm, shoulder, or hand: None  During the past week, how much difficulty have you had sleeping because of the pain in your arm, shoulder or hand?: Severe Difficulty  Number of Questions Answered: 11  Quick DASH Score: 36.36         Time Calculation:   Start Time: 0730  Total Timed Code Minutes- PT: 25 minute(s)    Therapy Charges for Today     Code Description Service Date Service Provider Modifiers Qty    55453844307 HC PT THER PROC EA 15 MIN 1/5/2018 Jose Daniel Shore, PT GP 2    62420504408 HC PT CARRY MOV HAND OBJ CURRENT 1/5/2018 Jose Daniel Shore, PT GP, CK 1    24673549920 HC PT CARRY MOV HAND OBJ PROJECTED 1/5/2018 Jose Daniel Shore, PT GP, CJ 1    88273769027 HC PT CARRY MOV HAND OBJ DISCHARGE 1/5/2018 Jose Daniel Shore, PT GP, CK 1          PT G-Codes  Outcome Measure Options: Quick DASH  Score: 36%  Functional Limitation: Carrying, moving and handling objects  Carrying, Moving and Handling Objects Current Status (): At least 40 percent but less than 60 percent impaired, limited or restricted  Carrying, Moving and Handling Objects Goal Status (): At least 20 percent but less than 40 percent impaired, limited or restricted  Carrying, Moving and Handling Objects Discharge Status (): At least 40 percent but less than 60 percent impaired, limited or restricted     OP PT Discharge Summary  Date of Discharge: 01/05/18  Reason for Discharge: Maximum functional potential achieved  Outcomes Achieved: Unable to make functional progress toward goals at this time  Discharge Destination: Home with home program  Discharge Instructions: Follow up with MD for likely referral to Ortho or MRI.        Jose Daniel Shore, PT  1/5/2018

## 2018-01-08 ENCOUNTER — APPOINTMENT (OUTPATIENT)
Dept: PHYSICAL THERAPY | Facility: HOSPITAL | Age: 83
End: 2018-01-08

## 2018-01-10 ENCOUNTER — APPOINTMENT (OUTPATIENT)
Dept: PHYSICAL THERAPY | Facility: HOSPITAL | Age: 83
End: 2018-01-10

## 2018-04-10 RX ORDER — ATENOLOL 50 MG/1
50 TABLET ORAL DAILY
Qty: 90 TABLET | Refills: 1 | Status: SHIPPED | OUTPATIENT
Start: 2018-04-10

## 2018-07-18 RX ORDER — TAMSULOSIN HYDROCHLORIDE 0.4 MG/1
CAPSULE ORAL
Qty: 180 CAPSULE | Refills: 1 | OUTPATIENT
Start: 2018-07-18

## 2018-07-19 RX ORDER — TAMSULOSIN HYDROCHLORIDE 0.4 MG/1
CAPSULE ORAL
Qty: 180 CAPSULE | Refills: 1 | OUTPATIENT
Start: 2018-07-19

## 2018-10-10 RX ORDER — ATENOLOL 50 MG/1
50 TABLET ORAL DAILY
Qty: 90 TABLET | Refills: 1 | OUTPATIENT
Start: 2018-10-10

## 2020-07-17 ENCOUNTER — OFFICE VISIT (OUTPATIENT)
Dept: ORTHOPEDIC SURGERY | Facility: CLINIC | Age: 85
End: 2020-07-17

## 2020-07-17 VITALS — HEART RATE: 55 BPM | HEIGHT: 73 IN | OXYGEN SATURATION: 98 % | BODY MASS INDEX: 33.6 KG/M2 | WEIGHT: 253.53 LBS

## 2020-07-17 DIAGNOSIS — Z96.652 PAIN DUE TO TOTAL LEFT KNEE REPLACEMENT, INITIAL ENCOUNTER (HCC): Primary | ICD-10-CM

## 2020-07-17 DIAGNOSIS — M25.561 PAIN IN BOTH KNEES, UNSPECIFIED CHRONICITY: ICD-10-CM

## 2020-07-17 DIAGNOSIS — T84.84XA PAIN DUE TO TOTAL LEFT KNEE REPLACEMENT, INITIAL ENCOUNTER (HCC): Primary | ICD-10-CM

## 2020-07-17 DIAGNOSIS — M25.562 LEFT KNEE PAIN, UNSPECIFIED CHRONICITY: ICD-10-CM

## 2020-07-17 DIAGNOSIS — Z96.659 STATUS POST UNILATERAL KNEE REPLACEMENT: ICD-10-CM

## 2020-07-17 DIAGNOSIS — M25.562 PAIN IN BOTH KNEES, UNSPECIFIED CHRONICITY: ICD-10-CM

## 2020-07-17 PROCEDURE — 99203 OFFICE O/P NEW LOW 30 MIN: CPT | Performed by: ORTHOPAEDIC SURGERY

## 2020-07-17 RX ORDER — PANTOPRAZOLE SODIUM 40 MG/1
40 TABLET, DELAYED RELEASE ORAL DAILY
COMMUNITY
End: 2020-09-14

## 2020-07-17 RX ORDER — CHOLECALCIFEROL (VITAMIN D3) 25 MCG
CAPSULE ORAL
COMMUNITY

## 2020-07-17 RX ORDER — NAPROXEN SODIUM 220 MG
220 TABLET ORAL 2 TIMES DAILY PRN
COMMUNITY
End: 2020-09-14

## 2020-07-17 NOTE — PROGRESS NOTES
Orthopaedic Clinic Note: Knee New Patient    Chief Complaint   Patient presents with   • Left Knee - Pain     Total knee arthroplasty Dr. Bermudez 2018   • Right Knee - Pain     Partial replacement Dr. Vaca 2017        LAINEY Lara is a 84 y.o. male who presents with bilateral knee pain for 2 year(s). Onset atraumatic and gradual in nature. Pain is localized to the entire knee (globally) especially along the medial aspect of the left knee and is a 4/10 on the pain scale. Pain is described as dull and aching. Associated symptoms include pain and swelling. The pain is worse with walking and climbing stairs; pain medication and/or NSAID make it better.  He is status post partial knee replacement by Dr. Vaca 3 years ago.  He underwent left total knee arthroplasty by Dr. Bermudez approximately a year and a half ago.  He states his postoperative course has been complicated by swelling of the left lower extremity that is gotten progressively worse.  He last saw Dr. Bermudez a month ago and duplex study was obtained to evaluate for blood clot.  He was found to have a clot extending from the femoral vein all the way down to the tibia.  He was initially started on Xarelto.  Over the course of the past 4 weeks, he states his pain has improved approximately 25% over the last 2 weeks.  He is ambulating with no assistive device today.  He denies fevers chills or constitutional symptoms.  He is here to discuss treatment options as a second opinion due to his dissatisfaction with the function of his knees in terms of daily activities.  He states that since having the knee replacement, he has had decreased physical activity and feels that he has not doing as well as he should.     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[facility])    • Fasting hypoglycemia 2016    Hemoglobin A1c normal   • Generalized osteoarthritis    • GERD (gastroesophageal reflux disease)     History esophageal stricture   • History of blood clots    • HNP (herniated nucleus pulposus), lumbar ,     L5 L6   • Hyperglycemia    • Hyperlipidemia    • Hypertension    • Microscopic colitis 2017    Positive biopsy - colonoscopy   • OAB (overactive bladder)     Persistent urgency   • Obesity Adulthood     body weight 244 BMI 32   • Painful total knee replacement (CMS/[facility])    • Paroxysmal atrial fibrillation (CMS/[facility])     one episode - cardioverted   • Prostatism    • Pulmonary embolism (CMS/[facility])     after injury and protracted immobilization   • Right knee DJD     Partial knee arthroplasty   • Shoulder dislocation     Repeated episodes   • Sleep disturbances     Frequently requires medication   • Transient cerebral ischemia ,     Negative medical workup on both occasions   • Venous stasis    • Vertigo       Past Surgical History:   Procedure Laterality Date   • CARDIOVERSION      Atrial fibrillation   • KNEE ARTHROPLASTY Right 2015    Partial    • KNEE ARTHROSCOPY Right 2015    Failed procedure   • LUMBAR DISC SURGERY      Surgery ×2 Ruptured disc   • LUMBAR DISCECTOMY      L5 L6       Family History   Problem Relation Age of Onset   • Dementia Mother          age 94   • Other Father          age 86 of unknown cause   • Rheum arthritis Father    • Coronary artery disease Brother    • No Known Problems Brother    • Sick sinus syndrome Other         Has pacemaker   • Diabetes Paternal Grandmother      Social History     Socioeconomic History   • Marital status:      Spouse name: Not on file   • Number of children: Not on file   • Years of education: Not on file   • Highest education level: Not on file   Occupational History   • Occupation: Businessman   Tobacco Use   • Smoking  status: Never Smoker   • Smokeless tobacco: Never Used   Substance and Sexual Activity   • Alcohol use: Yes     Comment: Occasionally   • Drug use: No   • Sexual activity: Yes     Partners: Female     Comment: Monogamous   Social History Narrative    Domestic life   lives in private home with wife        Faith   Mandaen        Sleep hygiene  chronic insomnia  -  intermittent use of Sonata.  2017  occasional Sonata perhaps weekly.          Caffeine use  2 or 3 cups of coffee daily        Exercise habits   works with a         Diet     low-calorie diabetic diet low in salt and low in sugar.          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     • atenolol (TENORMIN) 50 MG tablet TAKE 1 TABLET BY MOUTH DAILY. 90 tablet 1   • CALCIUM-MAGNESIUM-ZINC PO Take 1 tablet by mouth Daily.     • Cholecalciferol (VITAMIN D-3) 25 MCG (1000 UT) capsule Take  by mouth.     • Coenzyme Q10 200 MG capsule Take 200 mg by mouth Daily. 90 capsule 1   • Multiple Vitamins-Minerals (CENTRUM ADULTS) tablet Take 1-2 tablets by mouth daily.     • naproxen sodium (ALEVE) 220 MG tablet Take 220 mg by mouth 2 (Two) Times a Day As Needed.     • oxybutynin XL (DITROPAN-XL) 5 MG 24 hr tablet Take 1 tablet by mouth Every 12 (Twelve) Hours. 180 tablet 1   • pantoprazole (PROTONIX) 40 MG EC tablet Take 40 mg by mouth Daily.     • sildenafil (VIAGRA) 100 MG tablet Take 0.5-1 tablets by mouth as needed.     • tamsulosin (FLOMAX) 0.4 MG capsule 24 hr capsule TAKE 1 CAPSULE EVERY 12 HOURS 180 capsule 1   • Zinc 50 MG capsule Take  by mouth.     • [DISCONTINUED] aspirin 81 MG chewable tablet Chew 1 tablet Daily.     • [DISCONTINUED]  "Budesonide (ENTOCORT EC) 3 MG 24 hr capsule Take 1 capsule by mouth Daily.  11   • [DISCONTINUED] clopidogrel (PLAVIX) 75 MG tablet TAKE 1 TABLET BY MOUTH DAILY. 90 tablet 1   • [DISCONTINUED] Ibuprofen (ADVIL) 200 MG capsule Take 2 capsules by mouth 2 (Two) Times a Day. As needed     • [DISCONTINUED] Lorcaserin HCl (BELVIQ) 10 MG tablet Take 10 mg by mouth 2 (Two) Times a Day. 60 tablet 5   • [DISCONTINUED] raNITIdine (ZANTAC) 150 MG tablet Take 2 tablets by mouth Every Night. 90 tablet 3   • [DISCONTINUED] triamcinolone (KENALOG) 0.1 % cream Apply  topically. Apply to itchy rash twice daily as needed     • [DISCONTINUED] Vitamin D, Cholecalciferol, 1000 UNITS capsule Take 3 capsules by mouth daily.     • [DISCONTINUED] zaleplon (SONATA) 10 MG capsule Take 1 capsule by mouth At Night As Needed. 30 capsule 0     No current facility-administered medications on file prior to visit.       Allergies   Allergen Reactions   • Pravastatin Other (See Comments)     Nocturnal leg cramps   • Atorvastatin      Fatigue   • Qsymia [Phentermine-Topiramate] Mental Status Change   • Topiramate Myalgia        Review of Systems   Constitutional: Negative.    HENT: Negative.    Eyes: Negative.    Respiratory: Negative.    Cardiovascular: Negative.    Gastrointestinal: Negative.    Endocrine: Negative.    Genitourinary: Negative.    Musculoskeletal: Positive for arthralgias and joint swelling.   Skin: Negative.    Allergic/Immunologic: Negative.    Neurological: Negative.    Hematological: Negative.    Psychiatric/Behavioral: Negative.         The patient's Review of Systems was personally reviewed and confirmed as accurate.    The following portions of the patient's history were reviewed and updated as appropriate: allergies, current medications, past family history, past medical history, past social history, past surgical history and problem list.    Physical Exam  Pulse 55, height 185.4 cm (72.99\"), weight 115 kg (253 lb 8.5 oz), " SpO2 98 %.    Body mass index is 33.46 kg/m².    GENERAL APPEARANCE: awake, alert & oriented x 3, in no acute distress and well developed, well nourished  PSYCH: normal affect  LUNGS:  breathing nonlabored  EYES: sclera anicteric  CARDIOVASCULAR: palpable dorsalis pedis, palpable posterior tibial bilaterally. Capillary refill less than 2 seconds  EXTREMITIES: no clubbing, cyanosis  GAIT:  Normal            Right Lower Extremity Exam:   ----------  Hip Exam  ----------  FLEXION CONTRACTURE: None  FLEXION: 110 degrees  INTERNAL ROTATION: 20 degrees at 90 degrees of flexion   EXTERNAL ROTATION: 40 degrees at 90 degrees of flexion    PAIN WITH HIP MOTION: no  ----------  Knee Exam  ----------  ALIGNMENT: neutral, no varus or valgus deformity     RANGE OF MOTION:  Normal (0-120 degrees) with no extensor lag or flexion contracture  LIGAMENTOUS STABILITY:   Slight laxity varus valgus stress at 0 and 30 degrees consistent with degeneration of the lateral compartment of the knee    STRENGTH:  5/5 knee flexion, extension. 5/5 ankle dorsiflexion and plantarflexion.     PAIN WITH PALPATION: denies tenderness to palpation about the knee, denies medial or lateral joint line pain  KNEE EFFUSION: no  PAIN WITH KNEE ROM: no  PATELLAR CREPITUS: yes, painful and symptomatic  SPECIAL EXAM FINDINGS: Painful patellar compression    REFLEXES:  PATELLAR 2+/4  ACHILLES 2+/4    CLONUS: negative  STRAIGHT LEG TEST:   negative    SENSATION TO LIGHT TOUCH:  DEEP PERONEAL/SUPERFICIAL PERONEAL/SURAL/SAPHENOUS/TIBIAL:   intact    EDEMA: 1+ edema to mid tibia  ERYTHEMA:  no  WOUNDS/INCISIONS: no overlying skin problems.  Well-healed anterior surgical incision      Left Lower Extremity Exam:   ----------  Hip Exam  ----------  FLEXION CONTRACTURE: None  FLEXION: 110 degrees  INTERNAL ROTATION: 20 degrees at 90 degrees of flexion   EXTERNAL ROTATION: 40 degrees at 90 degrees of flexion    PAIN WITH HIP MOTION: no  ----------  Knee  Exam  ----------  ALIGNMENT: neutral, no varus or valgus deformity     RANGE OF MOTION:  Normal (0-120 degrees) with no extensor lag or flexion contracture  LIGAMENTOUS STABILITY:   stable to varus and valgus stress at 0 and 30 degrees without any evidence of laxity     STRENGTH:  5/5 knee flexion, extension. 5/5 ankle dorsiflexion and plantarflexion.     PAIN WITH PALPATION: Tender to palpation about the Pez bursa medial epicondyle, denies medial or lateral joint line pain  KNEE EFFUSION: Trace  PAIN WITH KNEE ROM: Yes  PATELLAR CREPITUS: yes, asymptomatic  SPECIAL EXAM FINDINGS:  Negative patellar compression    REFLEXES:  PATELLAR 2+/4  ACHILLES 2+/4    CLONUS: negative  STRAIGHT LEG TEST:   negative    SENSATION TO LIGHT TOUCH:  DEEP PERONEAL/SUPERFICIAL PERONEAL/SURAL/SAPHENOUS/TIBIAL:   intact    EDEMA: 2+ edema to proximal third of tibia with trace swelling extending to the knee  ERYTHEMA:  no  WOUNDS/INCISIONS: no overlying skin problems.    ______________________________________________________________________  ______________________________________________________________________    RADIOGRAPHIC FINDINGS:   Indication: Bilateral knee pain    Comparison: No prior xrays are available for comparison    Right knee(s) 3 views: Radiographs demonstrate a well-positioned unicompartmental knee arthroplasty construct.  No evidence of component loosening, subsidence, or failure.  There are moderate to advanced arthritic changes in the lateral and patellofemoral compartments of the knee.    Left knee 3 views: Demonstrate well positioned knee arthroplasty components in satisfactory alignment without evidence of wear, loosening, subsidence, fracture, or osteolysis      Assessment/Plan:   Diagnosis Plan   1. Pain due to total left knee replacement, initial encounter (CMS/[facility])     2. Left knee pain, unspecified chronicity     3. Pain in both knees, unspecified chronicity  XR Knee 3 View Bilateral   4. Status post  unilateral knee replacement       I had an extensive discussion with patient regarding the etiology of his ongoing knee pain and swelling.  In regards to the right knee, I do believe he has developed advanced degenerative changes in the lateral and patellofemoral compartments of the knee resulting in his pain.  Conversion to total knee arthroplasty would likely alleviate his symptoms.  However I do not recommend any intervention on the right knee until we figure out what is going on with the left knee.  In regards to left knee, it is unclear based on his history whether or not he is actually having intra-articular swelling or his swelling is extra-articular and in the soft tissues due to the diagnosis of DVT.  He does have some focal symptoms of Pez bursitis.  He also has significant tenderness to palpation about the lower leg due to the swelling in the soft tissues.  Given the symptom improvement over the past 2 weeks after initiating Xarelto treatment for DVT, I am somewhat suspicious that his knee pain and swelling are actually soft tissue based and are secondary to his acute DVT.  I see no glaring problems with the total knee construct.  I explained that work-up for infection versus Pez bursa injection could be performed as possible treatment options if his pain persists.  However, at this time, I think observation would be best given the fact that over the past 2 weeks his symptoms have improved roughly 25%.  I assured him that additional work-up would be performed as needed but at this point I would like to see him back in 4 weeks and continue observation at this time.  If his pain fails to improve at that follow-up appointment, additional intervention could be performed.  He was agreeable to this plan.    Zaheer Macedo MD  07/17/20  12:32

## 2020-08-14 ENCOUNTER — OFFICE VISIT (OUTPATIENT)
Dept: ORTHOPEDIC SURGERY | Facility: CLINIC | Age: 85
End: 2020-08-14

## 2020-08-14 VITALS — BODY MASS INDEX: 32.05 KG/M2 | WEIGHT: 241.8 LBS | HEIGHT: 73 IN | OXYGEN SATURATION: 97 % | HEART RATE: 61 BPM

## 2020-08-14 DIAGNOSIS — T84.84XD PAIN DUE TO TOTAL LEFT KNEE REPLACEMENT, SUBSEQUENT ENCOUNTER: Primary | ICD-10-CM

## 2020-08-14 DIAGNOSIS — Z96.652 PAIN DUE TO TOTAL LEFT KNEE REPLACEMENT, SUBSEQUENT ENCOUNTER: Primary | ICD-10-CM

## 2020-08-14 PROCEDURE — 99213 OFFICE O/P EST LOW 20 MIN: CPT | Performed by: ORTHOPAEDIC SURGERY

## 2020-08-14 NOTE — PROGRESS NOTES
Orthopaedic Clinic Note: Knee Established Patient    Chief Complaint   Patient presents with   • Follow-up     1 month f/u--Pain due to total left knee replacement        HPI    It has been 4  week(s) since Mr. Lara's last visit. He returns to clinic today for follow-up painful total left knee arthroplasty.  He has been on Xarelto for approximately 2 months.  He is continue complain of pain and swelling in the left knee that has been persistent since his surgery.  Rates his pain 4/10 on the pain scale.  He localizes pain globally throughout the knee.  He states it is limiting his ability to ambulate long distances or perform a significant amount of physical activity.  Overall he is doing the same.  He denies fever chills or constitutional symptoms.    Past Medical History:   Diagnosis Date   • Basal cell carcinoma    • Blood loss     post op   • ED (erectile dysfunction)     Responsive to Viagra   • Failed total knee, right (CMS/[facility])    • Fasting hypoglycemia 2016    Hemoglobin A1c normal   • Generalized osteoarthritis    • GERD (gastroesophageal reflux disease)     History esophageal stricture   • History of blood clots    • HNP (herniated nucleus pulposus), lumbar 1988, 2014    L5 L6   • Hyperglycemia    • Hyperlipidemia    • Hypertension    • Microscopic colitis 2017    Positive biopsy - colonoscopy   • OAB (overactive bladder) 2000    Persistent urgency   • Obesity Adulthood    2012 body weight 244 BMI 32   • Painful total knee replacement (CMS/HCC)    • Paroxysmal atrial fibrillation (CMS/HCC) 1991    one episode - cardioverted   • Prostatism 2014   • Pulmonary embolism (CMS/HCC) 1991    after injury and protracted immobilization   • Right knee DJD 2015    Partial knee arthroplasty   • Shoulder dislocation     Repeated episodes   • Sleep disturbances 1990    Frequently requires medication   • Transient cerebral ischemia 2010, 2016    Negative medical workup on both occasions   • Venous stasis    • Vertigo        Past Surgical History:   Procedure Laterality Date   • CARDIOVERSION      Atrial fibrillation   • KNEE ARTHROPLASTY Right 2015    Partial    • KNEE ARTHROSCOPY Right 2015    Failed procedure   • LUMBAR DISC SURGERY  2014    Surgery ×2 Ruptured disc   • LUMBAR DISCECTOMY  1988    L5 L6       Family History   Problem Relation Age of Onset   • Dementia Mother          age 94   • Other Father          age 86 of unknown cause   • Rheum arthritis Father    • Coronary artery disease Brother    • No Known Problems Brother    • Sick sinus syndrome Other         Has pacemaker   • Diabetes Paternal Grandmother      Social History     Socioeconomic History   • Marital status:      Spouse name: Not on file   • Number of children: Not on file   • Years of education: Not on file   • Highest education level: Not on file   Occupational History   • Occupation: Businessman   Tobacco Use   • Smoking status: Never Smoker   • Smokeless tobacco: Never Used   Substance and Sexual Activity   • Alcohol use: Yes     Comment: Occasionally   • Drug use: No   • Sexual activity: Yes     Partners: Female     Comment: Monogamous   Social History Narrative    Domestic life   lives in private home with wife        [employer]   [employer]        Sleep hygiene  chronic insomnia  -  intermittent use of Sonata.  2017  occasional Sonata perhaps weekly.          Caffeine use  2 or 3 cups of coffee daily        Exercise habits   works with a         Diet     low-calorie diabetic diet low in salt and low in sugar.          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     • atenolol (TENORMIN) 50 MG tablet TAKE 1 TABLET BY MOUTH  "DAILY. 90 tablet 1   • CALCIUM-MAGNESIUM-ZINC PO Take 1 tablet by mouth Daily.     • Cholecalciferol (VITAMIN D-3) 25 MCG (1000 UT) capsule Take  by mouth.     • Coenzyme Q10 200 MG capsule Take 200 mg by mouth Daily. 90 capsule 1   • Multiple Vitamins-Minerals (CENTRUM ADULTS) tablet Take 1-2 tablets by mouth daily.     • naproxen sodium (ALEVE) 220 MG tablet Take 220 mg by mouth 2 (Two) Times a Day As Needed.     • oxybutynin XL (DITROPAN-XL) 5 MG 24 hr tablet Take 1 tablet by mouth Every 12 (Twelve) Hours. 180 tablet 1   • pantoprazole (PROTONIX) 40 MG EC tablet Take 40 mg by mouth Daily.     • sildenafil (VIAGRA) 100 MG tablet Take 0.5-1 tablets by mouth as needed.     • tamsulosin (FLOMAX) 0.4 MG capsule 24 hr capsule TAKE 1 CAPSULE EVERY 12 HOURS 180 capsule 1   • Zinc 50 MG capsule Take  by mouth.       No current facility-administered medications on file prior to visit.       Allergies   Allergen Reactions   • Pravastatin Other (See Comments)     Nocturnal leg cramps   • Atorvastatin      Fatigue   • Qsymia [Phentermine-Topiramate] Mental Status Change   • Topiramate Myalgia        Review of Systems   Constitutional: Negative.    HENT: Negative.    Eyes: Negative.    Respiratory: Negative.    Cardiovascular: Negative.    Gastrointestinal: Negative.    Endocrine: Negative.    Genitourinary: Negative.    Musculoskeletal: Positive for arthralgias and joint swelling.   Skin: Negative.    Allergic/Immunologic: Negative.    Neurological: Negative.    Hematological: Negative.    Psychiatric/Behavioral: Negative.         The patient's Review of Systems was personally reviewed and confirmed as accurate.    Physical Exam  Pulse 61, height 185.4 cm (72.99\"), weight 110 kg (241 lb 12.8 oz), SpO2 97 %.    Body mass index is 31.91 kg/m².    GENERAL APPEARANCE: awake, alert, oriented, in no acute distress and well developed, well nourished  LUNGS:  breathing nonlabored  EXTREMITIES: no clubbing, cyanosis  PERIPHERAL " PULSES: palpable dorsalis pedis and posterior tibial pulses bilaterally.    GAIT:  Shuffling        ----------  Left Knee Exam:  ----------  ALIGNMENT: neutral  ----------  RANGE OF MOTION:  Decreased (5 - 120 degrees) with no extensor lag  LIGAMENTOUS STABILITY:   stable to varus and valgus stress at terminal extension and 30 degrees without any evidence of laxity  ----------  STRENGTH:  KNEE FLEXION 5/5  KNEE EXTENSION  5/5  ANKLE DORSIFLEXION  5/5  ANKLE PLANTARFLEXION  5/5  ----------  PAIN WITH PALPATION:medial joint line, anterior knee and patellar tendon  KNEE EFFUSION: yes, trace effusion  PAIN WITH KNEE ROM: yes  PATELLAR CREPITUS:  no  ----------  SENSATION TO LIGHT TOUCH:  DEEP PERONEAL/SUPERFICIAL PERONEAL/SURAL/SAPHENOUS/TIBIAL:    intact  ----------  EDEMA:  no  ERYTHEMA:    no  WOUNDS/INCISIONS:   yes, well healed surgical incision without evidence of erythema or drainage  _____________________________________________________________________  _____________________________________________________________________    RADIOGRAPHIC FINDINGS:   No new imaging today    Assessment/Plan:   Diagnosis Plan   1. Pain due to total left knee replacement, subsequent encounter  CBC Auto Differential    C-reactive Protein    Sedimentation Rate     Patient symptoms have persisted despite being on Xarelto.  He does continue to have a trace effusion of the knee as well as pain in the knee with range of motion.  I explained to him that although I have low suspicion for infection, the persistent small effusion has not improved with conservative measures.  Therefore I recommend a set of screening labs to evaluate for potential infection.  Sed rate, CRP, CBC with differential was ordered.  We will call him with results and discuss the next steps in treatment.      Zaheer Macedo MD  08/14/20  07:54

## 2020-08-17 ENCOUNTER — LAB (OUTPATIENT)
Dept: LAB | Facility: HOSPITAL | Age: 85
End: 2020-08-17

## 2020-08-17 DIAGNOSIS — Z96.652 PAIN DUE TO TOTAL LEFT KNEE REPLACEMENT, SUBSEQUENT ENCOUNTER: ICD-10-CM

## 2020-08-17 DIAGNOSIS — T84.84XD PAIN DUE TO TOTAL LEFT KNEE REPLACEMENT, SUBSEQUENT ENCOUNTER: ICD-10-CM

## 2020-08-17 LAB
BASOPHILS # BLD AUTO: 0.08 10*3/MM3 (ref 0–0.2)
BASOPHILS NFR BLD AUTO: 1.1 % (ref 0–1.5)
CRP SERPL-MCNC: 0.57 MG/DL (ref 0–0.5)
DEPRECATED RDW RBC AUTO: 50 FL (ref 37–54)
EOSINOPHIL # BLD AUTO: 0.3 10*3/MM3 (ref 0–0.4)
EOSINOPHIL NFR BLD AUTO: 4.3 % (ref 0.3–6.2)
ERYTHROCYTE [DISTWIDTH] IN BLOOD BY AUTOMATED COUNT: 13.7 % (ref 12.3–15.4)
ERYTHROCYTE [SEDIMENTATION RATE] IN BLOOD: 18 MM/HR (ref 0–20)
HCT VFR BLD AUTO: 43.5 % (ref 37.5–51)
HGB BLD-MCNC: 14.4 G/DL (ref 13–17.7)
IMM GRANULOCYTES # BLD AUTO: 0.03 10*3/MM3 (ref 0–0.05)
IMM GRANULOCYTES NFR BLD AUTO: 0.4 % (ref 0–0.5)
LYMPHOCYTES # BLD AUTO: 1.26 10*3/MM3 (ref 0.7–3.1)
LYMPHOCYTES NFR BLD AUTO: 18 % (ref 19.6–45.3)
MCH RBC QN AUTO: 32.7 PG (ref 26.6–33)
MCHC RBC AUTO-ENTMCNC: 33.1 G/DL (ref 31.5–35.7)
MCV RBC AUTO: 98.6 FL (ref 79–97)
MONOCYTES # BLD AUTO: 0.6 10*3/MM3 (ref 0.1–0.9)
MONOCYTES NFR BLD AUTO: 8.6 % (ref 5–12)
NEUTROPHILS NFR BLD AUTO: 4.73 10*3/MM3 (ref 1.7–7)
NEUTROPHILS NFR BLD AUTO: 67.6 % (ref 42.7–76)
NRBC BLD AUTO-RTO: 0 /100 WBC (ref 0–0.2)
PLATELET # BLD AUTO: 144 10*3/MM3 (ref 140–450)
PMV BLD AUTO: 11 FL (ref 6–12)
RBC # BLD AUTO: 4.41 10*6/MM3 (ref 4.14–5.8)
WBC # BLD AUTO: 7 10*3/MM3 (ref 3.4–10.8)

## 2020-08-17 PROCEDURE — 85652 RBC SED RATE AUTOMATED: CPT

## 2020-08-17 PROCEDURE — 36415 COLL VENOUS BLD VENIPUNCTURE: CPT

## 2020-08-17 PROCEDURE — 86140 C-REACTIVE PROTEIN: CPT

## 2020-08-17 PROCEDURE — 85025 COMPLETE CBC W/AUTO DIFF WBC: CPT

## 2020-08-18 ENCOUNTER — OFFICE VISIT (OUTPATIENT)
Dept: ORTHOPEDIC SURGERY | Facility: CLINIC | Age: 85
End: 2020-08-18

## 2020-08-18 VITALS — HEIGHT: 73 IN | WEIGHT: 243 LBS | BODY MASS INDEX: 32.2 KG/M2 | HEART RATE: 61 BPM | OXYGEN SATURATION: 99 %

## 2020-08-18 DIAGNOSIS — M19.012 PRIMARY LOCALIZED OSTEOARTHROSIS OF LEFT SHOULDER REGION: Primary | ICD-10-CM

## 2020-08-18 DIAGNOSIS — M25.512 LEFT SHOULDER PAIN, UNSPECIFIED CHRONICITY: ICD-10-CM

## 2020-08-18 DIAGNOSIS — M24.512 CONTRACTURE OF JOINT OF LEFT SHOULDER REGION: ICD-10-CM

## 2020-08-18 DIAGNOSIS — M75.22 BICEPS TENDINITIS OF LEFT UPPER EXTREMITY: ICD-10-CM

## 2020-08-18 PROCEDURE — 99214 OFFICE O/P EST MOD 30 MIN: CPT | Performed by: ORTHOPAEDIC SURGERY

## 2020-08-18 NOTE — PROGRESS NOTES
[facility] Orthopaedic Surgery Office Visit - Tan Miranda MD    Office Visit       Patient Name: Alin Lara    Chief Complaint:   Chief Complaint   Patient presents with   • Left Shoulder - Pain       Referring Physician:Chidi Macedo MD    History of Present Illness:   Alin Lara is a 84 y.o. male who presents with left body part: shoulder Reason: pain.  Onset:Onset: atraumatic and gradual in nature and 6 dislocations. The issue has been ongoing for 60 year(s). Pain is a 6/10 on the pain scale. Pain is described as Pain Characterization: dull, aching and throbbing. Associated symptoms include Symptoms: popping and grinding. The pain is worse with sleeping and working; resting improve the pain. Previous treatments have included: physical therapy. I have reviewed the patient's history of present illness as noted/entered above.    I have reviewed the patient's past medical history, surgical history, social history, family history, medications, and allergies as noted in the electronic medical record and as noted/entered.  I have reviewed the patient's review of systems as noted/enter and updated as noted in the patient's HPI.      LEFT SHOULDER - severe instability arthropathy    6 dislocations  Shoulder pain for many years  Sleep trouble  Treated with PT and medications    Retired    Treated at  with Dr. 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    Left shoulder has been severely debilitating for many years affecting basic activities of daily living severe night pain does desire to discuss total shoulder replacement.    I counseled that he has pretty severe glenoid morphology on his plain x-rays and that a likely reverse total shoulder would be  necessary with an augment.  We will await the 3D CT scan for further evaluation of the glenoid morphology and rotator cuff.  No prior surgery to the left shoulder    He remains very active.        Subjective   Subjective      Review of Systems   Constitutional: Negative.  Negative for chills, fatigue and fever.   HENT: Negative.  Negative for congestion and dental problem.    Eyes: Negative.  Negative for blurred vision.   Respiratory: Negative.  Negative for shortness of breath.    Cardiovascular: Negative.  Negative for leg swelling.   Gastrointestinal: Negative.  Negative for abdominal pain.   Endocrine: Negative.  Negative for polyuria.   Genitourinary: Negative.  Negative for difficulty urinating.   Musculoskeletal: Positive for arthralgias.   Skin: Negative.    Allergic/Immunologic: Negative.    Neurological: Negative.    Hematological: Negative.  Negative for adenopathy.   Psychiatric/Behavioral: Negative.  Negative for behavioral problems.        Past Medical History:   Past Medical History:   Diagnosis Date   • Basal cell carcinoma    • Blood loss     post op   • ED (erectile dysfunction)     Responsive to Viagra   • Failed total knee, right (CMS/HCC)    • Fasting hypoglycemia 2016    Hemoglobin A1c normal   • Generalized osteoarthritis    • GERD (gastroesophageal reflux disease)     History esophageal stricture   • History of blood clots    • HNP (herniated nucleus pulposus), lumbar 1988, 2014    L5 L6   • Hyperglycemia    • Hyperlipidemia    • Hypertension    • Microscopic colitis 2017    Positive biopsy - colonoscopy   • OAB (overactive bladder) 2000    Persistent urgency   • Obesity Adulthood    2012 body weight 244 BMI 32   • Painful total knee replacement (CMS/HCC)    • Paroxysmal atrial fibrillation (CMS/HCC) 1991    one episode - cardioverted   • Prostatism 2014   • Pulmonary embolism (CMS/HCC) 1991    after injury and protracted immobilization   • Right knee DJD 2015    Partial knee arthroplasty    • Shoulder dislocation     Repeated episodes   • Sleep disturbances     Frequently requires medication   • Transient cerebral ischemia , 2016    Negative medical workup on both occasions   • Venous stasis    • Vertigo        Past Surgical History:   Past Surgical History:   Procedure Laterality Date   • CARDIOVERSION      Atrial fibrillation   • KNEE ARTHROPLASTY Right 2015    Partial    • KNEE ARTHROSCOPY Right 2015    Failed procedure   • LUMBAR DISC SURGERY  2014    Surgery ×2 Ruptured disc   • LUMBAR DISCECTOMY      L5 L6        Family History:   Family History   Problem Relation Age of Onset   • Dementia Mother          age 94   • Other Father          age 86 of unknown cause   • Rheum arthritis Father    • Coronary artery disease Brother    • No Known Problems Brother    • Sick sinus syndrome Other         Has pacemaker   • Diabetes Paternal Grandmother        Social History:   Social History     Socioeconomic History   • Marital status:      Spouse name: Not on file   • Number of children: Not on file   • Years of education: Not on file   • Highest education level: Not on file   Occupational History   • Occupation: Businessman   Tobacco Use   • Smoking status: Never Smoker   • Smokeless tobacco: Never Used   Substance and Sexual Activity   • Alcohol use: Yes     Comment: Occasionally   • Drug use: No   • Sexual activity: Yes     Partners: Female     Comment: Monogamous   Social History Narrative    Domestic life   lives in private home with wife        Denominational   Rastafari        Sleep hygiene  chronic insomnia  -  intermittent use of Sonata.  2017  occasional Sonata perhaps weekly.          Caffeine use  2 or 3 cups of coffee daily        Exercise habits   works with a         Diet     low-calorie diabetic diet low in salt and low in sugar.          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[place]        Hearing   minimal hearing impairment         "Vision   corrects with lenses        Driving    no limitations       Medications:   Current Outpatient Medications:   •  ascorbic acid (VITAMIN C) 1000 MG tablet, Take 1 capsule by mouth Daily., Disp: , Rfl:   •  atenolol (TENORMIN) 50 MG tablet, TAKE 1 TABLET BY MOUTH DAILY., Disp: 90 tablet, Rfl: 1  •  CALCIUM-MAGNESIUM-ZINC PO, Take 1 tablet by mouth Daily., Disp: , Rfl:   •  Cholecalciferol (VITAMIN D-3) 25 MCG (1000 UT) capsule, Take  by mouth., Disp: , Rfl:   •  Coenzyme Q10 200 MG capsule, Take 200 mg by mouth Daily., Disp: 90 capsule, Rfl: 1  •  Multiple Vitamins-Minerals (CENTRUM ADULTS) tablet, Take 1-2 tablets by mouth daily., Disp: , Rfl:   •  naproxen sodium (ALEVE) 220 MG tablet, Take 220 mg by mouth 2 (Two) Times a Day As Needed., Disp: , Rfl:   •  oxybutynin XL (DITROPAN-XL) 5 MG 24 hr tablet, Take 1 tablet by mouth Every 12 (Twelve) Hours., Disp: 180 tablet, Rfl: 1  •  pantoprazole (PROTONIX) 40 MG EC tablet, Take 40 mg by mouth Daily., Disp: , Rfl:   •  sildenafil (VIAGRA) 100 MG tablet, Take 0.5-1 tablets by mouth as needed., Disp: , Rfl:   •  tamsulosin (FLOMAX) 0.4 MG capsule 24 hr capsule, TAKE 1 CAPSULE EVERY 12 HOURS, Disp: 180 capsule, Rfl: 1  •  Zinc 50 MG capsule, Take  by mouth., Disp: , Rfl:     Allergies:   Allergies   Allergen Reactions   • Pravastatin Other (See Comments)     Nocturnal leg cramps   • Atorvastatin      Fatigue   • Qsymia [Phentermine-Topiramate] Mental Status Change   • Topiramate Myalgia       The following portions of the patient's history were reviewed and updated as appropriate: allergies, current medications, past family history, past medical history, past social history, past surgical history and problem list.        Objective    Objective      Vital Signs:   Vitals:    08/18/20 0754   Pulse: 61   SpO2: 99%   Weight: 110 kg (243 lb)   Height: 185.4 cm (72.99\")       Ortho Exam:  General: no acute distress, comfortable  Vitals reviewed in chart  Head: " normocephalic, atraumatic  Ears: no external drainage noted  Eyes: extraocular muscles appear intact with good tracking  Psych: alert and oriented, normal appearing mood  Vascular: 2+ pulses symmetric, well perfused  Neurologic:  Sensation to light touch intact distally  Spine/Cervical exam: motion preserved  Dermatologic: skin not hot/red/swollen  Respiratory: breathing comfortably on room air, no intercostal retractions  Cardiovascular: regular rate and rhythm, well perfused    Musculoskeletal Exam    SIDE: LEFT SHOULDER  Shoulder Exam:    Tenderness:  Biceps, gh joint    Range of motion measurements (degrees)  Forward flexion/Abduction/External rotation at side/ER at 90/IR at 90/IR position  Active: Severely limited active range of motion and passive range of motion 60/45/-10/0/0  Passive: 80/70/-10/0/0    Painful arc of motion: yes  Glenohumeral joint contracture: yes  Glenohumeral joint crepitus: yes  Glenohumeral joint pain: yes  No evidence of septic joint  Impingement testing Neer's test - positive/painful  Impingement testing Hawkin's test - positive/painful    Rotator Cuff Testing: difficult to test rotator cuff with severe contracture and pain  Tenderness to palpation at rotator cuff - no  Rotator cuff testing Leticia's test - negative  Rotator cuff testing External rotation - negative  Rotator cuff testing Lag signs - negative  Rotator cuff testing Belly press - negative  Pain with abduction great than 90 degrees - yes  Rotator cuff testing limiting by glenohumeral joint pain and stiffness    Scapular dyskinesis - present, abnormal scapular motion    Long head of the biceps testing:  Durbin's test for biceps - positive  Bicipital groove tenderness to palpation - positive      Results Review:   Imaging Results (Last 24 Hours)     Procedure Component Value Units Date/Time    XR Shoulder 2+ View Left [[id number]] Resulted:  08/18/20 0804     Updated:  08/18/20 0805    Narrative:       Imaging: shoulder x-rays 2  views - AP and axillary x-ray views    Side: LEFT    Indication for shoulder x-ray 2 views: shoulder pain    Comparison: no comparison views available    Findings: No acute bony pathology. No superior humeral head migration.    Severe left shoulder glenohumeral joint arthritis.  Posterior subluxation   and posterior bone loss findings consistent with a Walch B3 glenoid but   the possibility of some baseline dysplasia as well.      I personally reviewed the above x-rays and discussed with the patient.                Procedures             Assessment / Plan      Assessment/Plan:   Problem List Items Addressed This Visit        Nervous and Auditory    Left shoulder pain    Relevant Orders    XR Shoulder 2+ View Left (Completed)       Musculoskeletal and Integument    Primary localized osteoarthrosis of left shoulder region - Primary    Relevant Orders    CT shoulder left wo contrast    Contracture of joint of left shoulder region    Relevant Orders    CT shoulder left wo contrast    Biceps tendinitis of left upper extremity    Relevant Orders    CT shoulder left wo contrast          A CT scan (Blueprint Protocol) is critical for assessment of the patient's glenoid morphology and rotator cuff status in anticipation of surgical intervention (shoulder arthroplasty).  The CT scan is critical as a surgical planning tool.  The patient has failed conservative measures and a CT scan is the next critical step in surgical decision making.    The indication for the CT scan without arthrogram is for assessment of the patient's glenoid morphology and rotator cuff status in anticipation of surgical intervention (shoulder arthroplasty) in the setting of glenohumeral joint arthritis.  The CT scan is a critical surgical planning tool.    Counseled on operative versus nonoperative options for severe left shoulder arthritis.  Patient does desire to proceed with total shoulder replacement.  We will see him back to discuss the CT scan at  length but anticipate reverse shoulder replacement if indeed he has a severe B3 glenoid as it appears on plain x-ray.  The left shoulder pain is very debilitating he does does desire to proceed with total shoulder arthroplasty we will see him back in follow-up.    Follow Up:   Return in about 1 week (around 8/25/2020) for Follow-up After Tests Completed.        Tan Miranda MD, FAAOS  Orthopedic Surgeon  Fellowship Trained Shoulder and Elbow Surgeon  [facility]  Orthopedics and Sports Medicine  [address] [place] [address], [address]  [place] [address]    08/18/20  08:37    Please note that portions of this note may have been completed with a voice recognition program. Efforts were made to edit the dictations, but occasionally words are mistranscribed.

## 2020-08-24 ENCOUNTER — HOSPITAL ENCOUNTER (OUTPATIENT)
Dept: CT IMAGING | Facility: HOSPITAL | Age: 85
Discharge: HOME | End: 2020-08-24
Admitting: ORTHOPAEDIC SURGERY

## 2020-08-24 DIAGNOSIS — M75.22 BICEPS TENDINITIS OF LEFT UPPER EXTREMITY: ICD-10-CM

## 2020-08-24 DIAGNOSIS — M24.512 CONTRACTURE OF JOINT OF LEFT SHOULDER REGION: ICD-10-CM

## 2020-08-24 DIAGNOSIS — M19.012 PRIMARY LOCALIZED OSTEOARTHROSIS OF LEFT SHOULDER REGION: ICD-10-CM

## 2020-08-24 PROCEDURE — 73200 CT UPPER EXTREMITY W/O DYE: CPT

## 2020-08-27 ENCOUNTER — PREP FOR SURGERY (OUTPATIENT)
Dept: OTHER | Facility: HOSPITAL | Age: 85
End: 2020-08-27

## 2020-08-27 ENCOUNTER — OFFICE VISIT (OUTPATIENT)
Dept: ORTHOPEDIC SURGERY | Facility: CLINIC | Age: 85
End: 2020-08-27

## 2020-08-27 VITALS — HEIGHT: 73 IN | BODY MASS INDEX: 32.14 KG/M2 | HEART RATE: 63 BPM | OXYGEN SATURATION: 97 % | WEIGHT: 242.51 LBS

## 2020-08-27 DIAGNOSIS — M19.012 PRIMARY LOCALIZED OSTEOARTHROSIS OF LEFT SHOULDER REGION: Primary | ICD-10-CM

## 2020-08-27 DIAGNOSIS — M75.22 BICEPS TENDINITIS OF LEFT UPPER EXTREMITY: ICD-10-CM

## 2020-08-27 DIAGNOSIS — M24.512 CONTRACTURE OF JOINT OF LEFT SHOULDER REGION: ICD-10-CM

## 2020-08-27 PROCEDURE — 99214 OFFICE O/P EST MOD 30 MIN: CPT | Performed by: ORTHOPAEDIC SURGERY

## 2020-08-27 RX ORDER — CEFAZOLIN SODIUM 2 G/100ML
2 INJECTION, SOLUTION INTRAVENOUS ONCE
Status: CANCELLED | OUTPATIENT
Start: 2020-08-27 | End: 2020-08-27

## 2020-08-27 NOTE — PROGRESS NOTES
[facility] [place]       Office Follow Up Visit       Patient Name: Alin Lara    Chief Complaint:   Chief Complaint   Patient presents with   • Follow-up     Post CT 08/24/2020 - Primary localized osteoarthrosis of left shoulder region       Referring Physician: No ref. provider found    History of Present Illness:   It has been 1  week(s) since Alin Lara's last visit. Alin Lara returns to clinic today for F/U: follow-up of leftBody Part: shoulderReason: pain. The issue has been ongoing for 40 year(s). Alin Lara rates HIS/HER: hispain at 6/10 on the pain scale. Previous/current treatments: physical therapy. Current symptoms:Symptoms: same as prior visit. The pain is worse with sleeping and working; resting improves the pain. Overall, he/she: heis doing the same. I have reviewed the patient's history of present illness as noted/entered above.    I have reviewed the patient's past medical history, surgical history, social history, family history, medications, and allergies as noted in the electronic medical record and as noted/entered.  I have reviewed the patient's review of systems as noted/enter and updated as noted in the patient's HPI.      CT scan follow-up left shoulder    Left shoulder severe debilitation he desires to proceed with surgery as soon as possible.    Severe night pain      PRIOR HISTORY:  LEFT SHOULDER - severe instability arthropathy     6 dislocations  Shoulder pain for many years  Sleep trouble  Treated with PT and medications     Retired     Treated at  with Dr. Santi Thompson for several years; nonoperative management for shoulder joint arthritis     Paroxysmal Afib  History of PE      Prior total knee     Former pitcher, right-hand-dominant also has a significant right shoulder arthritis as well.     Left shoulder has been severely debilitating for many years affecting basic activities of  daily living severe night pain does desire to discuss total shoulder replacement.     I counseled that he has pretty severe glenoid morphology on his plain x-rays and that a likely reverse total shoulder would be necessary with an augment.  We will await the 3D CT scan for further evaluation of the glenoid morphology and rotator cuff.  No prior surgery to the left shoulder     He remains very active.        Subjective   Subjective      Review of Systems   Constitutional: Negative.  Negative for chills, fatigue and fever.   HENT: Negative.  Negative for congestion and dental problem.    Eyes: Negative.  Negative for blurred vision.   Respiratory: Negative.  Negative for shortness of breath.    Cardiovascular: Negative.  Negative for leg swelling.   Gastrointestinal: Negative.  Negative for abdominal pain.   Endocrine: Negative.  Negative for polyuria.   Genitourinary: Negative.  Negative for difficulty urinating.   Musculoskeletal: Positive for arthralgias.   Skin: Negative.    Allergic/Immunologic: Negative.    Neurological: Negative.    Hematological: Negative.  Negative for adenopathy.   Psychiatric/Behavioral: Negative.  Negative for behavioral problems.        Past Medical History:   Past Medical History:   Diagnosis Date   • Basal cell carcinoma    • Blood loss     post op   • ED (erectile dysfunction)     Responsive to Viagra   • Failed total knee, right (CMS/HCC)    • Fasting hypoglycemia 2016    Hemoglobin A1c normal   • Generalized osteoarthritis    • GERD (gastroesophageal reflux disease)     History esophageal stricture   • History of blood clots    • HNP (herniated nucleus pulposus), lumbar 1988, 2014    L5 L6   • Hyperglycemia    • Hyperlipidemia    • Hypertension    • Microscopic colitis 2017    Positive biopsy - colonoscopy   • OAB (overactive bladder) 2000    Persistent urgency   • Obesity Adulthood    2012 body weight 244 BMI 32   • Painful total knee replacement (CMS/HCC)    • Paroxysmal atrial  fibrillation (CMS/HCC)     one episode - cardioverted   • Prostatism    • Pulmonary embolism (CMS/HCC)     after injury and protracted immobilization   • Right knee DJD 2015    Partial knee arthroplasty   • Shoulder dislocation     Repeated episodes   • Sleep disturbances     Frequently requires medication   • Transient cerebral ischemia ,     Negative medical workup on both occasions   • Venous stasis    • Vertigo        Past Surgical History:   Past Surgical History:   Procedure Laterality Date   • CARDIOVERSION      Atrial fibrillation   • KNEE ARTHROPLASTY Right 2015    Partial    • KNEE ARTHROSCOPY Right 2015    Failed procedure   • LUMBAR DISC SURGERY  2014    Surgery ×2 Ruptured disc   • LUMBAR DISCECTOMY      L5 L6        Family History:   Family History   Problem Relation Age of Onset   • Dementia Mother          age 94   • Other Father          age 86 of unknown cause   • Rheum arthritis Father    • Coronary artery disease Brother    • No Known Problems Brother    • Sick sinus syndrome Other         Has pacemaker   • Diabetes Paternal Grandmother        Social History:   Social History     Socioeconomic History   • Marital status:      Spouse name: Not on file   • Number of children: Not on file   • Years of education: Not on file   • Highest education level: Not on file   Occupational History   • Occupation: Businessman   Tobacco Use   • Smoking status: Never Smoker   • Smokeless tobacco: Never Used   Substance and Sexual Activity   • Alcohol use: Yes     Comment: Occasionally   • Drug use: No   • Sexual activity: Yes     Partners: Female     Comment: Monogamous   Social History Narrative    Domestic life   lives in private home with wife        Gnosticism   Advent        Sleep hygiene  chronic insomnia  -  intermittent use of Sonata.  2017  occasional Sonata perhaps weekly.          Caffeine use  2 or 3 cups of coffee daily        Exercise habits   works  with a         Diet     low-calorie diabetic diet low in salt and low in sugar.          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[place]        Hearing   minimal hearing impairment        Vision   corrects with lenses        Driving    no limitations       Medications:   Current Outpatient Medications:   •  ascorbic acid (VITAMIN C) 1000 MG tablet, Take 1 capsule by mouth Daily., Disp: , Rfl:   •  atenolol (TENORMIN) 50 MG tablet, TAKE 1 TABLET BY MOUTH DAILY., Disp: 90 tablet, Rfl: 1  •  benzoyl peroxide 5 % external liquid, Apply to shoulder daily for 3 days prior to surgery., Disp: 148 g, Rfl: 0  •  CALCIUM-MAGNESIUM-ZINC PO, Take 1 tablet by mouth Daily., Disp: , Rfl:   •  Cholecalciferol (VITAMIN D-3) 25 MCG (1000 UT) capsule, Take  by mouth., Disp: , Rfl:   •  Coenzyme Q10 200 MG capsule, Take 200 mg by mouth Daily., Disp: 90 capsule, Rfl: 1  •  Multiple Vitamins-Minerals (CENTRUM ADULTS) tablet, Take 1-2 tablets by mouth daily., Disp: , Rfl:   •  mupirocin (BACTROBAN) 2 % ointment, Apply pea size amount to each nostril twice daily for 5 days prior to surgery, Disp: 22 g, Rfl: 0  •  naproxen sodium (ALEVE) 220 MG tablet, Take 220 mg by mouth 2 (Two) Times a Day As Needed., Disp: , Rfl:   •  oxybutynin XL (DITROPAN-XL) 5 MG 24 hr tablet, Take 1 tablet by mouth Every 12 (Twelve) Hours., Disp: 180 tablet, Rfl: 1  •  pantoprazole (PROTONIX) 40 MG EC tablet, Take 40 mg by mouth Daily., Disp: , Rfl:   •  sildenafil (VIAGRA) 100 MG tablet, Take 0.5-1 tablets by mouth as needed., Disp: , Rfl:   •  tamsulosin (FLOMAX) 0.4 MG capsule 24 hr capsule, TAKE 1 CAPSULE EVERY 12 HOURS, Disp: 180 capsule, Rfl: 1  •  Zinc 50 MG capsule, Take  by mouth., Disp: , Rfl:     Allergies:   Allergies   Allergen Reactions   • Pravastatin Other (See Comments)     Nocturnal leg cramps   • Atorvastatin      Fatigue   • Qsymia [Phentermine-Topiramate] Mental Status Change   • Topiramate Myalgia       The  "following portions of the patient's history were reviewed and updated as appropriate: allergies, current medications, past family history, past medical history, past social history, past surgical history and problem list.        Objective    Objective      Vital Signs:   Vitals:    08/27/20 0801   Pulse: 63   SpO2: 97%   Weight: 110 kg (242 lb 8.1 oz)   Height: 185.4 cm (72.99\")       Ortho Exam:  Left shoulder exam has remained unchanged he has severe shoulder joint contracture I have very limited active range of motion impacted passive range of motion    General no acute distress but significant left shoulder pain  Psych: Mood within normal limits  Vascular well perfused  Skin no skin changes to left shoulder  Neuro sensation light touch intact distally  HEENT: Normocephalic atraumatic, glasses and mask in place  Vitals reviewed    Baseline exam as noted prior  SIDE: LEFT SHOULDER  Shoulder Exam:     Tenderness:  Biceps, gh joint     Range of motion measurements (degrees)  Forward flexion/Abduction/External rotation at side/ER at 90/IR at 90/IR position  Active: Severely limited active range of motion and passive range of motion 60/45/-10/0/0  Passive: 80/70/-10/0/0     Painful arc of motion: yes  Glenohumeral joint contracture: yes  Glenohumeral joint crepitus: yes  Glenohumeral joint pain: yes  No evidence of septic joint  Impingement testing Neer's test - positive/painful  Impingement testing Hawkin's test - positive/painful      Results Review:  Imaging Results (Last 24 Hours)     ** No results found for the last 24 hours. **      No radiology results for the last 30 days.       I personally reviewed his CT scan of his left shoulder completed at [facility] on 8/24/2020 he has severe retroversion B3 glenoid 25 degrees of retroversion 9 degrees superior inclination 82% subluxation.  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 The patient desires to proceed with operative intervention.    Reverse total shoulder replacement was discussed.      Specific risks discussed included pain, bleeding, infection, injury to surrounding nerve and blood vessels, fracture, instability, failure or lack of healing of repair, incomplete pain relief, hardware failure, potential need for additional procedures, stiffness after surgery, and potential inability to restore range of motion and strength. Medical and anesthetic complications were additionally discussed.     General anesthesia is required, sling compliance, and compliance with physical therapy and/or a surgeon guided exercise program will be very important to the recovery process.    We also discussed the risks and benefits of postoperative medications including potential opioid medications for pain control. We discussed the need for compliance with WENDY and the opioid pain medications and proper liberation from these medicines as soon as possible following surgery.     Diagnosis and CPT Codes  1. Shoulder joint arthritis and rotator cuff tear - Open reverse total shoulder arthroplasty CPT Code 69681  2. Shoulder joint contracture - Open shoulder joint  contracture release CPT Code 96079  3. Shoulder biceps tendonitis - Open shoulder biceps tenodesis CPT Code 00966    The patient understands that the surgery is elective surgery.  I discussed with the patient the risks and benefits of proceeding with surgery vs delaying surgery during the COVID-19 pandemic. The risks we discussed included but were not limited to the risk of rickie a severe COVID-19 infection due to exposure within the hospital or during convalescence at home that may result in permanent injury or death.  The patient voiced understanding and gave their consent to proceed.  The patient desires to proceed.     I think that it is important for my patients to know that I work as a paid consultant, teacher, and  for an orthopedic device company/shoulder implant company ([email or web address] so I counseled regarding this.      I counseled the patient that I do serve as a paid consultant, speaker, surgeon educator, and technology and implant design, among other roles.  I do not get paid to use any specific company's implants, and I would never do that.  My number one priority is to provide the best possible care I can for my patients.  I enjoy my opportunity to educate other surgeons, and the ability to advance shoulder surgery with improved technology and improved shoulder surgery techniques and products.    I was happy to disclose my work as a medical device consultant with the patient and offered to answer any related questions.      I personally discussed the plan of care in detail with patient today. The patient verbally understands and agrees. I spent and counseled for approximately 25 minutes face to face, with greater than 50 % of the time counseling on the patient's diagnosis and plan discussed today.        Follow Up:   Return for Schedule Surgery.      Tan Miranda MD, [facility]  Orthopedic Surgeon  Fellowship Trained Shoulder and Elbow Surgeon  [facility]  [facility]  Orthopedics and Sports Medicine  [address] [place] [address], [address]  [place] [address]    08/27/20  08:29    Please note that portions of this note may have been completed with a voice recognition program. Efforts were made to edit the dictations, but occasionally words are mistranscribed.

## 2020-09-04 ENCOUNTER — TELEPHONE (OUTPATIENT)
Dept: ORTHOPEDIC SURGERY | Facility: CLINIC | Age: 85
End: 2020-09-04

## 2020-09-04 NOTE — TELEPHONE ENCOUNTER
I spoke with the patient per Dr. Miranda/Dr. Macedo's request.    I told him that both providers would like him to see his PCP and have them weigh in on the safety of him stopping his Xarelto 3 days prior to surgery.  As well as a repeat US of BLE to look at legs prior to surgery and to have his PCP take care of ordering and following up on.  The patient understood and said he would get working on.  I told him if PCP had any questions for them to call us.    Nilda

## 2020-09-08 ENCOUNTER — DOCUMENTATION (OUTPATIENT)
Dept: ORTHOPEDIC SURGERY | Facility: CLINIC | Age: 85
End: 2020-09-08

## 2020-09-08 NOTE — PROGRESS NOTES
I have had good conversations with Mr. Lara by phone regarding his medical complexities.  I counseled regarding his DVT which was diagnosed at  at the early part of June.  He had had a prior left total knee replacement.    I spoke with the Nilda Brady with our office to relay recommendations that we would need clearance that he has resolved the DVT.  We would recommend an updated ultrasound of bilateral lower extremities to assess for DVT that still ongoing.  He still does have left lower extremity baseline swelling.  We would also need clearance with regards to DVT if it indeed has resolved and also a plan with his ongoing Xarelto and DVT management.  This would need to come from his primary care team either Dr. Henry Keller or Dr. Washington who treated him for the DVT in the past.    I was unable to reach him by phone this a.m. but will hopefully speak with him later to reemphasize our plan to get safe clearance with regards to his DVT especially with his known history of a prior PE in the past as well.    He was also having some dietary issues with diarrhea that is been ongoing for several weeks with his new diet changes.  We also recommended that he has a better handle on that so that his nutrition is in a good place in anticipation of the surgery.    We are happy to proceed with his shoulder replacement if and when he is cleared from a primary care perspective with regards to his fairly recent DVT of the left lower extremity.  
Patient/EMS

## 2020-09-14 ENCOUNTER — APPOINTMENT (OUTPATIENT)
Dept: PREADMISSION TESTING | Facility: HOSPITAL | Age: 85
End: 2020-09-14

## 2020-09-14 VITALS — HEIGHT: 74 IN | WEIGHT: 248 LBS | BODY MASS INDEX: 31.83 KG/M2

## 2020-09-14 DIAGNOSIS — M19.012 PRIMARY LOCALIZED OSTEOARTHROSIS OF LEFT SHOULDER REGION: ICD-10-CM

## 2020-09-14 DIAGNOSIS — M24.512 CONTRACTURE OF JOINT OF LEFT SHOULDER REGION: ICD-10-CM

## 2020-09-14 DIAGNOSIS — M75.22 BICEPS TENDINITIS OF LEFT UPPER EXTREMITY: ICD-10-CM

## 2020-09-14 LAB
ABO GROUP BLD: NORMAL
ALBUMIN SERPL-MCNC: 4.1 G/DL (ref 3.5–5.2)
ALBUMIN/GLOB SERPL: 2 G/DL
ALP SERPL-CCNC: 71 U/L (ref 39–117)
ALT SERPL W P-5'-P-CCNC: 20 U/L (ref 1–41)
ANION GAP SERPL CALCULATED.3IONS-SCNC: 10 MMOL/L (ref 5–15)
APTT PPP: 30.5 SECONDS (ref 24–37)
AST SERPL-CCNC: 26 U/L (ref 1–40)
BASOPHILS # BLD AUTO: 0.03 10*3/MM3 (ref 0–0.2)
BASOPHILS NFR BLD AUTO: 0.4 % (ref 0–1.5)
BILIRUB SERPL-MCNC: 0.6 MG/DL (ref 0–1.2)
BLD GP AB SCN SERPL QL: NEGATIVE
BUN SERPL-MCNC: 11 MG/DL (ref 8–23)
BUN/CREAT SERPL: 10.3 (ref 7–25)
CALCIUM SPEC-SCNC: 9 MG/DL (ref 8.6–10.5)
CHLORIDE SERPL-SCNC: 101 MMOL/L (ref 98–107)
CO2 SERPL-SCNC: 28 MMOL/L (ref 22–29)
CREAT SERPL-MCNC: 1.07 MG/DL (ref 0.76–1.27)
DEPRECATED RDW RBC AUTO: 47.6 FL (ref 37–54)
EOSINOPHIL # BLD AUTO: 0.2 10*3/MM3 (ref 0–0.4)
EOSINOPHIL NFR BLD AUTO: 2.7 % (ref 0.3–6.2)
ERYTHROCYTE [DISTWIDTH] IN BLOOD BY AUTOMATED COUNT: 13.3 % (ref 12.3–15.4)
GFR SERPL CREATININE-BSD FRML MDRD: 66 ML/MIN/1.73
GLOBULIN UR ELPH-MCNC: 2.1 GM/DL
GLUCOSE SERPL-MCNC: 101 MG/DL (ref 65–99)
HBA1C MFR BLD: 5.1 % (ref 4.8–5.6)
HCT VFR BLD AUTO: 45.2 % (ref 37.5–51)
HGB BLD-MCNC: 14.7 G/DL (ref 13–17.7)
IMM GRANULOCYTES # BLD AUTO: 0.05 10*3/MM3 (ref 0–0.05)
IMM GRANULOCYTES NFR BLD AUTO: 0.7 % (ref 0–0.5)
INR PPP: 1.08 (ref 0.85–1.16)
LYMPHOCYTES # BLD AUTO: 1.84 10*3/MM3 (ref 0.7–3.1)
LYMPHOCYTES NFR BLD AUTO: 24.9 % (ref 19.6–45.3)
MCH RBC QN AUTO: 31.7 PG (ref 26.6–33)
MCHC RBC AUTO-ENTMCNC: 32.5 G/DL (ref 31.5–35.7)
MCV RBC AUTO: 97.6 FL (ref 79–97)
MONOCYTES # BLD AUTO: 0.45 10*3/MM3 (ref 0.1–0.9)
MONOCYTES NFR BLD AUTO: 6.1 % (ref 5–12)
MRSA DNA SPEC QL NAA+PROBE: NEGATIVE
NEUTROPHILS NFR BLD AUTO: 4.82 10*3/MM3 (ref 1.7–7)
NEUTROPHILS NFR BLD AUTO: 65.2 % (ref 42.7–76)
NRBC BLD AUTO-RTO: 0 /100 WBC (ref 0–0.2)
PLATELET # BLD AUTO: 150 10*3/MM3 (ref 140–450)
PMV BLD AUTO: 10.7 FL (ref 6–12)
POTASSIUM SERPL-SCNC: 4.6 MMOL/L (ref 3.5–5.2)
PROT SERPL-MCNC: 6.2 G/DL (ref 6–8.5)
PROTHROMBIN TIME: 13.8 SECONDS (ref 11.5–14)
RBC # BLD AUTO: 4.63 10*6/MM3 (ref 4.14–5.8)
RH BLD: POSITIVE
SODIUM SERPL-SCNC: 139 MMOL/L (ref 136–145)
T&S EXPIRATION DATE: NORMAL
WBC # BLD AUTO: 7.39 10*3/MM3 (ref 3.4–10.8)

## 2020-09-14 PROCEDURE — 85025 COMPLETE CBC W/AUTO DIFF WBC: CPT | Performed by: ORTHOPAEDIC SURGERY

## 2020-09-14 PROCEDURE — 86900 BLOOD TYPING SEROLOGIC ABO: CPT | Performed by: ORTHOPAEDIC SURGERY

## 2020-09-14 PROCEDURE — 36415 COLL VENOUS BLD VENIPUNCTURE: CPT

## 2020-09-14 PROCEDURE — 80053 COMPREHEN METABOLIC PANEL: CPT | Performed by: ORTHOPAEDIC SURGERY

## 2020-09-14 PROCEDURE — 87641 MR-STAPH DNA AMP PROBE: CPT | Performed by: ORTHOPAEDIC SURGERY

## 2020-09-14 PROCEDURE — 86850 RBC ANTIBODY SCREEN: CPT | Performed by: ORTHOPAEDIC SURGERY

## 2020-09-14 PROCEDURE — 85610 PROTHROMBIN TIME: CPT | Performed by: ORTHOPAEDIC SURGERY

## 2020-09-14 PROCEDURE — U0004 COV-19 TEST NON-CDC HGH THRU: HCPCS

## 2020-09-14 PROCEDURE — C9803 HOPD COVID-19 SPEC COLLECT: HCPCS

## 2020-09-14 PROCEDURE — 86901 BLOOD TYPING SEROLOGIC RH(D): CPT | Performed by: ORTHOPAEDIC SURGERY

## 2020-09-14 PROCEDURE — 85730 THROMBOPLASTIN TIME PARTIAL: CPT | Performed by: ORTHOPAEDIC SURGERY

## 2020-09-14 PROCEDURE — 83036 HEMOGLOBIN GLYCOSYLATED A1C: CPT | Performed by: ORTHOPAEDIC SURGERY

## 2020-09-15 ENCOUNTER — DOCUMENTATION (OUTPATIENT)
Dept: ORTHOPEDIC SURGERY | Facility: CLINIC | Age: 85
End: 2020-09-15

## 2020-09-15 ENCOUNTER — TELEPHONE (OUTPATIENT)
Dept: PREADMISSION TESTING | Facility: HOSPITAL | Age: 85
End: 2020-09-15

## 2020-09-15 LAB — SARS-COV-2 RNA NOSE QL NAA+PROBE: DETECTED

## 2020-09-15 NOTE — PROGRESS NOTES
I was contacted by orthopedic or director with regards to Mr. monte COVID test which was positive.  I spoke with my practice manager and the surgery scheduling team to determine the course of action with a positive test in the setting of an elective surgery.  The recommendation is for a 28-day hold and we will reassess at that time.    I personally called Mr. Lara to update him with regards to his positive COVID test.  He notes that he remains completely asymptomatic.    I counseled with regards to recommendation for delaying the surgery for 28 days and a recommendation in general for a 14-day quarantine in the setting.    Did recommend that he personally discussed with his primary care provider with regards to COVID-19 management and quarantine recommendations.  He currently remains asymptomatic.  We will regroup once he clears this and he will update his PCP if he develops any symptoms as well.

## 2020-09-15 NOTE — PROGRESS NOTES
I have reviewed Mr. Yang's labs for the surgery which are reassuring.    I have had multiple discussions with Mr. yang by phone leading up to the day of surgery which is tomorrow.  He remains fully committed to surgery.    I spoken multiple times and corresponded with his primary care doctor Dr. Keller with [facility].  Dr. Keller reviewed his [facility] notes, cardiology correspondence, ultrasound including discussion with the radiologist.  Dr. Kleler provided his blessing and clearance for surgery.    Dr. Grider the patient's cardiologist from [facility] also agreed with clearance for surgery noting the DVT was chronic and he is okay to be off of his anticoagulation medicine 3 days before and 2 to 3 days after if needed.    Given the patient's history we have had good discussions regarding the seriousness of a chronic DVT and a history of a PE.  The patient does understand the risks.  It appears that these findings are more chronic at this point and appear to have stabilized and improved.    The patient is done an excellent job to receive PCP, cardiac, PAT clearances and has worked very diligently to obtain these clearances and very much desires to proceed with the elective shoulder replacement given continued pain and limitations of his left shoulder.    We will proceed with surgery as scheduled 9/16/2020

## 2020-09-22 DIAGNOSIS — M24.512 CONTRACTURE OF JOINT OF LEFT SHOULDER REGION: ICD-10-CM

## 2020-09-22 DIAGNOSIS — M75.22 BICEPS TENDINITIS OF LEFT UPPER EXTREMITY: ICD-10-CM

## 2020-09-22 DIAGNOSIS — M19.012 PRIMARY LOCALIZED OSTEOARTHROSIS OF LEFT SHOULDER REGION: Primary | ICD-10-CM

## 2020-10-18 ENCOUNTER — ANESTHESIA EVENT (OUTPATIENT)
Dept: PERIOP | Facility: HOSPITAL | Age: 85
End: 2020-10-18

## 2020-10-19 ENCOUNTER — APPOINTMENT (OUTPATIENT)
Dept: GENERAL RADIOLOGY | Facility: HOSPITAL | Age: 85
End: 2020-10-19

## 2020-10-19 ENCOUNTER — ANESTHESIA (OUTPATIENT)
Dept: PERIOP | Facility: HOSPITAL | Age: 85
End: 2020-10-19

## 2020-10-19 ENCOUNTER — HOSPITAL ENCOUNTER (INPATIENT)
Facility: HOSPITAL | Age: 85
LOS: 2 days | Discharge: HOME HEALTH | End: 2020-10-21
Attending: ORTHOPAEDIC SURGERY | Admitting: ORTHOPAEDIC SURGERY

## 2020-10-19 DIAGNOSIS — Z74.09 IMPAIRED MOBILITY AND ACTIVITIES OF DAILY LIVING: ICD-10-CM

## 2020-10-19 DIAGNOSIS — M75.22 BICEPS TENDINITIS OF LEFT UPPER EXTREMITY: ICD-10-CM

## 2020-10-19 DIAGNOSIS — I48.0 PAROXYSMAL ATRIAL FIBRILLATION (HCC): ICD-10-CM

## 2020-10-19 DIAGNOSIS — Z96.612 S/P REVERSE TOTAL SHOULDER ARTHROPLASTY, LEFT: Primary | ICD-10-CM

## 2020-10-19 DIAGNOSIS — Z78.9 IMPAIRED MOBILITY AND ACTIVITIES OF DAILY LIVING: ICD-10-CM

## 2020-10-19 DIAGNOSIS — M19.012 PRIMARY LOCALIZED OSTEOARTHROSIS OF LEFT SHOULDER REGION: ICD-10-CM

## 2020-10-19 DIAGNOSIS — I10 ESSENTIAL HYPERTENSION: ICD-10-CM

## 2020-10-19 DIAGNOSIS — M24.512 CONTRACTURE OF JOINT OF LEFT SHOULDER REGION: ICD-10-CM

## 2020-10-19 PROCEDURE — 25010000002 NEOSTIGMINE 10 MG/10ML SOLUTION: Performed by: NURSE ANESTHETIST, CERTIFIED REGISTERED

## 2020-10-19 PROCEDURE — 25010000003 CEFAZOLIN IN DEXTROSE 2-4 GM/100ML-% SOLUTION: Performed by: ORTHOPAEDIC SURGERY

## 2020-10-19 PROCEDURE — 25010000002 PROPOFOL 10 MG/ML EMULSION: Performed by: NURSE ANESTHETIST, CERTIFIED REGISTERED

## 2020-10-19 PROCEDURE — 23472 RECONSTRUCT SHOULDER JOINT: CPT | Performed by: ORTHOPAEDIC SURGERY

## 2020-10-19 PROCEDURE — S0260 H&P FOR SURGERY: HCPCS | Performed by: ORTHOPAEDIC SURGERY

## 2020-10-19 PROCEDURE — 25010000003 LIDOCAINE 1 % SOLUTION: Performed by: NURSE ANESTHETIST, CERTIFIED REGISTERED

## 2020-10-19 PROCEDURE — 0RRK00Z REPLACEMENT OF LEFT SHOULDER JOINT WITH REVERSE BALL AND SOCKET SYNTHETIC SUBSTITUTE, OPEN APPROACH: ICD-10-PCS | Performed by: ORTHOPAEDIC SURGERY

## 2020-10-19 PROCEDURE — 25010000002 VANCOMYCIN 10 G RECONSTITUTED SOLUTION: Performed by: ORTHOPAEDIC SURGERY

## 2020-10-19 PROCEDURE — 25010000002 FENTANYL CITRATE (PF) 100 MCG/2ML SOLUTION: Performed by: ANESTHESIOLOGY

## 2020-10-19 PROCEDURE — 25010000002 ROPIVACAINE PER 1 MG: Performed by: ANESTHESIOLOGY

## 2020-10-19 PROCEDURE — 25010000002 ONDANSETRON PER 1 MG: Performed by: NURSE ANESTHETIST, CERTIFIED REGISTERED

## 2020-10-19 PROCEDURE — 0LS40ZZ REPOSITION LEFT UPPER ARM TENDON, OPEN APPROACH: ICD-10-PCS | Performed by: ORTHOPAEDIC SURGERY

## 2020-10-19 PROCEDURE — 76942 ECHO GUIDE FOR BIOPSY: CPT | Performed by: ORTHOPAEDIC SURGERY

## 2020-10-19 PROCEDURE — C1776 JOINT DEVICE (IMPLANTABLE): HCPCS | Performed by: ORTHOPAEDIC SURGERY

## 2020-10-19 PROCEDURE — 25010000002 DEXAMETHASONE PER 1 MG: Performed by: NURSE ANESTHETIST, CERTIFIED REGISTERED

## 2020-10-19 PROCEDURE — 25010000002 FENTANYL CITRATE (PF) 100 MCG/2ML SOLUTION: Performed by: NURSE ANESTHETIST, CERTIFIED REGISTERED

## 2020-10-19 PROCEDURE — 73020 X-RAY EXAM OF SHOULDER: CPT

## 2020-10-19 DEVICE — IMPLANTABLE DEVICE
Type: IMPLANTABLE DEVICE | Site: SHOULDER | Status: FUNCTIONAL
Brand: AEQUALIS™ PERFORM™ REVERSED

## 2020-10-19 DEVICE — IMPLANTABLE DEVICE
Type: IMPLANTABLE DEVICE | Site: SHOULDER | Status: FUNCTIONAL
Brand: AEQUALIS™ PERFORM REVERSED

## 2020-10-19 DEVICE — IMPLANTABLE DEVICE
Type: IMPLANTABLE DEVICE | Site: SHOULDER | Status: FUNCTIONAL
Brand: AEQUALIS™ PERFORM+ REVERSED

## 2020-10-19 DEVICE — SCRW PERIPH 5X34MM: Type: IMPLANTABLE DEVICE | Site: SHOULDER | Status: FUNCTIONAL

## 2020-10-19 DEVICE — IMPLANTABLE DEVICE: Type: IMPLANTABLE DEVICE | Site: SHOULDER | Status: FUNCTIONAL

## 2020-10-19 DEVICE — SCRW AEQUALIS PERFORM PERIPH 5X26MM: Type: IMPLANTABLE DEVICE | Site: SHOULDER | Status: FUNCTIONAL

## 2020-10-19 DEVICE — SUT FW #2 W/TPR NDL 1/2 CIR 38IN 97CM 26.5MM BLU: Type: IMPLANTABLE DEVICE | Site: SHOULDER | Status: FUNCTIONAL

## 2020-10-19 DEVICE — IMPLANTABLE DEVICE
Type: IMPLANTABLE DEVICE | Site: SHOULDER | Status: FUNCTIONAL
Brand: FLEX SHOULDER SYSTEM

## 2020-10-19 DEVICE — SCRW AEQUALIS PERFORM PERIPH 5X22MM: Type: IMPLANTABLE DEVICE | Site: SHOULDER | Status: FUNCTIONAL

## 2020-10-19 DEVICE — IMPLANTABLE DEVICE
Type: IMPLANTABLE DEVICE | Site: SHOULDER | Status: FUNCTIONAL
Brand: AEQUALIS™ ASCEND™ FLEX

## 2020-10-19 DEVICE — SCRW AEQUALIS PERFORM PERIPH 5X30MM: Type: IMPLANTABLE DEVICE | Site: SHOULDER | Status: FUNCTIONAL

## 2020-10-19 RX ORDER — NALOXONE HCL 0.4 MG/ML
0.1 VIAL (ML) INJECTION
Status: DISCONTINUED | OUTPATIENT
Start: 2020-10-19 | End: 2020-10-21 | Stop reason: HOSPADM

## 2020-10-19 RX ORDER — GLYCOPYRROLATE 0.2 MG/ML
INJECTION INTRAMUSCULAR; INTRAVENOUS AS NEEDED
Status: DISCONTINUED | OUTPATIENT
Start: 2020-10-19 | End: 2020-10-19 | Stop reason: SURG

## 2020-10-19 RX ORDER — HYDROMORPHONE HYDROCHLORIDE 1 MG/ML
0.5 INJECTION, SOLUTION INTRAMUSCULAR; INTRAVENOUS; SUBCUTANEOUS
Status: DISCONTINUED | OUTPATIENT
Start: 2020-10-19 | End: 2020-10-21 | Stop reason: HOSPADM

## 2020-10-19 RX ORDER — ONDANSETRON 2 MG/ML
4 INJECTION INTRAMUSCULAR; INTRAVENOUS ONCE
Status: COMPLETED | OUTPATIENT
Start: 2020-10-19 | End: 2020-10-19

## 2020-10-19 RX ORDER — EPHEDRINE SULFATE 50 MG/ML
5 INJECTION, SOLUTION INTRAVENOUS ONCE AS NEEDED
Status: DISCONTINUED | OUTPATIENT
Start: 2020-10-19 | End: 2020-10-19 | Stop reason: HOSPADM

## 2020-10-19 RX ORDER — LIDOCAINE HYDROCHLORIDE 10 MG/ML
0.5 INJECTION, SOLUTION EPIDURAL; INFILTRATION; INTRACAUDAL; PERINEURAL ONCE AS NEEDED
Status: COMPLETED | OUTPATIENT
Start: 2020-10-19 | End: 2020-10-19

## 2020-10-19 RX ORDER — FAMOTIDINE 10 MG/ML
20 INJECTION, SOLUTION INTRAVENOUS ONCE
Status: DISCONTINUED | OUTPATIENT
Start: 2020-10-19 | End: 2020-10-19 | Stop reason: HOSPADM

## 2020-10-19 RX ORDER — AMOXICILLIN 250 MG
2 CAPSULE ORAL 2 TIMES DAILY PRN
Status: DISCONTINUED | OUTPATIENT
Start: 2020-10-19 | End: 2020-10-21 | Stop reason: HOSPADM

## 2020-10-19 RX ORDER — LABETALOL HYDROCHLORIDE 5 MG/ML
10 INJECTION, SOLUTION INTRAVENOUS EVERY 4 HOURS PRN
Status: DISCONTINUED | OUTPATIENT
Start: 2020-10-19 | End: 2020-10-21 | Stop reason: HOSPADM

## 2020-10-19 RX ORDER — FENTANYL CITRATE 50 UG/ML
INJECTION, SOLUTION INTRAMUSCULAR; INTRAVENOUS
Status: COMPLETED | OUTPATIENT
Start: 2020-10-19 | End: 2020-10-19

## 2020-10-19 RX ORDER — ACETAMINOPHEN 325 MG/1
325 TABLET ORAL EVERY 8 HOURS PRN
Status: DISCONTINUED | OUTPATIENT
Start: 2020-10-19 | End: 2020-10-21 | Stop reason: HOSPADM

## 2020-10-19 RX ORDER — LABETALOL HYDROCHLORIDE 5 MG/ML
INJECTION, SOLUTION INTRAVENOUS AS NEEDED
Status: DISCONTINUED | OUTPATIENT
Start: 2020-10-19 | End: 2020-10-19 | Stop reason: SURG

## 2020-10-19 RX ORDER — PROMETHAZINE HYDROCHLORIDE 25 MG/1
25 TABLET ORAL ONCE AS NEEDED
Status: DISCONTINUED | OUTPATIENT
Start: 2020-10-19 | End: 2020-10-19 | Stop reason: HOSPADM

## 2020-10-19 RX ORDER — SODIUM CHLORIDE, SODIUM LACTATE, POTASSIUM CHLORIDE, CALCIUM CHLORIDE 600; 310; 30; 20 MG/100ML; MG/100ML; MG/100ML; MG/100ML
9 INJECTION, SOLUTION INTRAVENOUS CONTINUOUS
Status: DISCONTINUED | OUTPATIENT
Start: 2020-10-19 | End: 2020-10-19

## 2020-10-19 RX ORDER — DIPHENHYDRAMINE HYDROCHLORIDE 50 MG/ML
25 INJECTION INTRAMUSCULAR; INTRAVENOUS EVERY 6 HOURS PRN
Status: DISCONTINUED | OUTPATIENT
Start: 2020-10-19 | End: 2020-10-21 | Stop reason: HOSPADM

## 2020-10-19 RX ORDER — DEXAMETHASONE SODIUM PHOSPHATE 4 MG/ML
INJECTION, SOLUTION INTRA-ARTICULAR; INTRALESIONAL; INTRAMUSCULAR; INTRAVENOUS; SOFT TISSUE AS NEEDED
Status: DISCONTINUED | OUTPATIENT
Start: 2020-10-19 | End: 2020-10-19 | Stop reason: SURG

## 2020-10-19 RX ORDER — ESMOLOL HYDROCHLORIDE 10 MG/ML
INJECTION INTRAVENOUS AS NEEDED
Status: DISCONTINUED | OUTPATIENT
Start: 2020-10-19 | End: 2020-10-19 | Stop reason: SURG

## 2020-10-19 RX ORDER — LIDOCAINE HYDROCHLORIDE 10 MG/ML
INJECTION, SOLUTION INFILTRATION; PERINEURAL AS NEEDED
Status: DISCONTINUED | OUTPATIENT
Start: 2020-10-19 | End: 2020-10-19 | Stop reason: SURG

## 2020-10-19 RX ORDER — HYDROCODONE BITARTRATE AND ACETAMINOPHEN 10; 325 MG/1; MG/1
1 TABLET ORAL EVERY 4 HOURS PRN
Status: DISCONTINUED | OUTPATIENT
Start: 2020-10-19 | End: 2020-10-21 | Stop reason: HOSPADM

## 2020-10-19 RX ORDER — ATRACURIUM BESYLATE 10 MG/ML
INJECTION, SOLUTION INTRAVENOUS AS NEEDED
Status: DISCONTINUED | OUTPATIENT
Start: 2020-10-19 | End: 2020-10-19 | Stop reason: SURG

## 2020-10-19 RX ORDER — SODIUM CHLORIDE 0.9 % (FLUSH) 0.9 %
10 SYRINGE (ML) INJECTION AS NEEDED
Status: DISCONTINUED | OUTPATIENT
Start: 2020-10-19 | End: 2020-10-19 | Stop reason: HOSPADM

## 2020-10-19 RX ORDER — SODIUM CHLORIDE 0.9 % (FLUSH) 0.9 %
10 SYRINGE (ML) INJECTION EVERY 12 HOURS SCHEDULED
Status: DISCONTINUED | OUTPATIENT
Start: 2020-10-19 | End: 2020-10-19 | Stop reason: HOSPADM

## 2020-10-19 RX ORDER — NEOSTIGMINE METHYLSULFATE 1 MG/ML
INJECTION, SOLUTION INTRAVENOUS AS NEEDED
Status: DISCONTINUED | OUTPATIENT
Start: 2020-10-19 | End: 2020-10-19 | Stop reason: SURG

## 2020-10-19 RX ORDER — TAMSULOSIN HYDROCHLORIDE 0.4 MG/1
0.8 CAPSULE ORAL NIGHTLY
Status: DISCONTINUED | OUTPATIENT
Start: 2020-10-19 | End: 2020-10-21 | Stop reason: HOSPADM

## 2020-10-19 RX ORDER — FENTANYL CITRATE 50 UG/ML
50 INJECTION, SOLUTION INTRAMUSCULAR; INTRAVENOUS
Status: DISCONTINUED | OUTPATIENT
Start: 2020-10-19 | End: 2020-10-19 | Stop reason: HOSPADM

## 2020-10-19 RX ORDER — ONDANSETRON 2 MG/ML
4 INJECTION INTRAMUSCULAR; INTRAVENOUS EVERY 6 HOURS PRN
Status: DISCONTINUED | OUTPATIENT
Start: 2020-10-19 | End: 2020-10-21 | Stop reason: HOSPADM

## 2020-10-19 RX ORDER — SODIUM CHLORIDE 9 MG/ML
75 INJECTION, SOLUTION INTRAVENOUS CONTINUOUS
Status: DISCONTINUED | OUTPATIENT
Start: 2020-10-19 | End: 2020-10-21 | Stop reason: HOSPADM

## 2020-10-19 RX ORDER — MAGNESIUM HYDROXIDE 1200 MG/15ML
LIQUID ORAL AS NEEDED
Status: DISCONTINUED | OUTPATIENT
Start: 2020-10-19 | End: 2020-10-19 | Stop reason: HOSPADM

## 2020-10-19 RX ORDER — ATENOLOL 50 MG/1
50 TABLET ORAL DAILY
Status: DISCONTINUED | OUTPATIENT
Start: 2020-10-19 | End: 2020-10-21 | Stop reason: HOSPADM

## 2020-10-19 RX ORDER — FAMOTIDINE 20 MG/1
20 TABLET, FILM COATED ORAL ONCE
Status: COMPLETED | OUTPATIENT
Start: 2020-10-19 | End: 2020-10-19

## 2020-10-19 RX ORDER — CEFAZOLIN SODIUM 2 G/100ML
2 INJECTION, SOLUTION INTRAVENOUS ONCE
Status: COMPLETED | OUTPATIENT
Start: 2020-10-19 | End: 2020-10-19

## 2020-10-19 RX ORDER — BUPIVACAINE HYDROCHLORIDE 2.5 MG/ML
INJECTION, SOLUTION EPIDURAL; INFILTRATION; INTRACAUDAL AS NEEDED
Status: DISCONTINUED | OUTPATIENT
Start: 2020-10-19 | End: 2020-10-19 | Stop reason: SURG

## 2020-10-19 RX ORDER — DIPHENHYDRAMINE HCL 25 MG
25 CAPSULE ORAL EVERY 6 HOURS PRN
Status: DISCONTINUED | OUTPATIENT
Start: 2020-10-19 | End: 2020-10-21 | Stop reason: HOSPADM

## 2020-10-19 RX ORDER — OXYBUTYNIN CHLORIDE 5 MG/1
5 TABLET, EXTENDED RELEASE ORAL NIGHTLY
Status: DISCONTINUED | OUTPATIENT
Start: 2020-10-19 | End: 2020-10-21 | Stop reason: HOSPADM

## 2020-10-19 RX ORDER — PROPOFOL 10 MG/ML
VIAL (ML) INTRAVENOUS AS NEEDED
Status: DISCONTINUED | OUTPATIENT
Start: 2020-10-19 | End: 2020-10-19 | Stop reason: SURG

## 2020-10-19 RX ADMIN — VANCOMYCIN HYDROCHLORIDE 1750 MG: 10 INJECTION, POWDER, LYOPHILIZED, FOR SOLUTION INTRAVENOUS at 20:20

## 2020-10-19 RX ADMIN — ATRACURIUM BESYLATE 10 MG: 10 INJECTION, SOLUTION INTRAVENOUS at 10:16

## 2020-10-19 RX ADMIN — PHENOL 2 SPRAY: 1.5 LIQUID ORAL at 21:41

## 2020-10-19 RX ADMIN — FENTANYL CITRATE 50 MCG: 0.05 INJECTION, SOLUTION INTRAMUSCULAR; INTRAVENOUS at 12:49

## 2020-10-19 RX ADMIN — SODIUM CHLORIDE, POTASSIUM CHLORIDE, SODIUM LACTATE AND CALCIUM CHLORIDE 9 ML/HR: 600; 310; 30; 20 INJECTION, SOLUTION INTRAVENOUS at 08:18

## 2020-10-19 RX ADMIN — FENTANYL CITRATE 100 MCG: 50 INJECTION, SOLUTION INTRAMUSCULAR; INTRAVENOUS at 08:54

## 2020-10-19 RX ADMIN — LABETALOL HYDROCHLORIDE 10 MG: 5 INJECTION, SOLUTION INTRAVENOUS at 10:12

## 2020-10-19 RX ADMIN — ESMOLOL HYDROCHLORIDE 50 MG: 10 INJECTION, SOLUTION INTRAVENOUS at 10:02

## 2020-10-19 RX ADMIN — NEOSTIGMINE 4 MG: 1 INJECTION INTRAVENOUS at 11:49

## 2020-10-19 RX ADMIN — ATRACURIUM BESYLATE 40 MG: 10 INJECTION, SOLUTION INTRAVENOUS at 09:31

## 2020-10-19 RX ADMIN — FENTANYL CITRATE 50 MCG: 0.05 INJECTION, SOLUTION INTRAMUSCULAR; INTRAVENOUS at 13:14

## 2020-10-19 RX ADMIN — SODIUM CHLORIDE 75 ML/HR: 9 INJECTION, SOLUTION INTRAVENOUS at 12:37

## 2020-10-19 RX ADMIN — FENTANYL CITRATE 50 MCG: 0.05 INJECTION, SOLUTION INTRAMUSCULAR; INTRAVENOUS at 12:34

## 2020-10-19 RX ADMIN — OXYBUTYNIN CHLORIDE 5 MG: 5 TABLET, EXTENDED RELEASE ORAL at 20:20

## 2020-10-19 RX ADMIN — DEXAMETHASONE SODIUM PHOSPHATE 8 MG: 4 INJECTION, SOLUTION INTRAMUSCULAR; INTRAVENOUS at 09:31

## 2020-10-19 RX ADMIN — TRANEXAMIC ACID 1000 MG: 100 INJECTION, SOLUTION INTRAVENOUS at 11:13

## 2020-10-19 RX ADMIN — SODIUM CHLORIDE 75 ML/HR: 9 INJECTION, SOLUTION INTRAVENOUS at 19:10

## 2020-10-19 RX ADMIN — HYDROCODONE BITARTRATE AND ACETAMINOPHEN 1 TABLET: 10; 325 TABLET ORAL at 17:20

## 2020-10-19 RX ADMIN — ATENOLOL 50 MG: 50 TABLET ORAL at 16:16

## 2020-10-19 RX ADMIN — LIDOCAINE HYDROCHLORIDE 0.5 ML: 10 INJECTION, SOLUTION EPIDURAL; INFILTRATION; INTRACAUDAL; PERINEURAL at 08:17

## 2020-10-19 RX ADMIN — CEFAZOLIN SODIUM 2 G: 2 INJECTION, SOLUTION INTRAVENOUS at 08:53

## 2020-10-19 RX ADMIN — TRANEXAMIC ACID 1000 MG: 100 INJECTION, SOLUTION INTRAVENOUS at 09:45

## 2020-10-19 RX ADMIN — PROPOFOL 25 MCG/KG/MIN: 10 INJECTION, EMULSION INTRAVENOUS at 09:42

## 2020-10-19 RX ADMIN — FENTANYL CITRATE 50 MCG: 0.05 INJECTION, SOLUTION INTRAMUSCULAR; INTRAVENOUS at 13:26

## 2020-10-19 RX ADMIN — BUPIVACAINE HYDROCHLORIDE 5 ML: 2.5 INJECTION, SOLUTION EPIDURAL; INFILTRATION; INTRACAUDAL; PERINEURAL at 11:49

## 2020-10-19 RX ADMIN — FAMOTIDINE 20 MG: 20 TABLET, FILM COATED ORAL at 08:18

## 2020-10-19 RX ADMIN — ONDANSETRON 4 MG: 2 INJECTION INTRAMUSCULAR; INTRAVENOUS at 12:44

## 2020-10-19 RX ADMIN — BUPIVACAINE HYDROCHLORIDE 15 ML: 2.5 INJECTION, SOLUTION EPIDURAL; INFILTRATION; INTRACAUDAL; PERINEURAL at 08:55

## 2020-10-19 RX ADMIN — LIDOCAINE HYDROCHLORIDE 50 MG: 10 INJECTION, SOLUTION INFILTRATION; PERINEURAL at 09:31

## 2020-10-19 RX ADMIN — GLYCOPYRROLATE 0.4 MG: 0.2 INJECTION INTRAMUSCULAR; INTRAVENOUS at 11:49

## 2020-10-19 RX ADMIN — ESMOLOL HYDROCHLORIDE 50 MG: 10 INJECTION, SOLUTION INTRAVENOUS at 09:31

## 2020-10-19 RX ADMIN — PROPOFOL 150 MG: 10 INJECTION, EMULSION INTRAVENOUS at 09:31

## 2020-10-19 RX ADMIN — MUPIROCIN 1 APPLICATION: 20 OINTMENT TOPICAL at 08:18

## 2020-10-19 RX ADMIN — TAMSULOSIN HYDROCHLORIDE 0.8 MG: 0.4 CAPSULE ORAL at 20:20

## 2020-10-19 RX ADMIN — VANCOMYCIN HYDROCHLORIDE 1750 MG: 10 INJECTION, POWDER, LYOPHILIZED, FOR SOLUTION INTRAVENOUS at 09:20

## 2020-10-19 RX ADMIN — ATRACURIUM BESYLATE 10 MG: 10 INJECTION, SOLUTION INTRAVENOUS at 10:58

## 2020-10-19 NOTE — H&P
Patient Name: Alin Lara  MRN: [id number]  : 1935  DOS: 10/19/2020    Attending: Tan Miranda MD    Primary Care Provider: Henry Keller MD      Chief complaint: Left shoulder pain    Subjective   Patient is a pleasant 84 y.o. male presented for scheduled surgery by Dr. Miranda.  He underwent left reverse total shoulder arthroplasty under general anesthesia.  He tolerated surgery well and was admitted for further medical management.  His shoulder has been painful with limited range of motion for many years.  He denies use of assistive device for ambulation or recent falls.    When seen in PACU he is doing fairly well.  He does report moderate left shoulder pain.  He denies nausea, shortness of breath or chest pain.  He does have history of PE and is on chronic Xarelto.    (Above is noted, agree.  Doing well when seen in his room afterwards.  Good pain control.  No nausea or vomiting, no shortness of breath.  He ambulated with staff but was reported to be somewhat unsteady.  He tolerated p.o. diet.  He voided only a small amount so far .)wy      Allergies   Allergen Reactions   • Pravastatin Other (See Comments)     Nocturnal leg cramps   • Atorvastatin      Fatigue   • Qsymia [Phentermine-Topiramate] Mental Status Change   • Topiramate Myalgia       Meds:  Medications Prior to Admission   Medication Sig Dispense Refill Last Dose   • ascorbic acid (VITAMIN C) 1000 MG tablet Take 1 capsule by mouth Daily.   Past Week   • atenolol (TENORMIN) 50 MG tablet TAKE 1 TABLET BY MOUTH DAILY. 90 tablet 1 10/18/2020   • CALCIUM-MAGNESIUM-ZINC PO Take 1 tablet by mouth Daily.   Past Week   • Cholecalciferol (VITAMIN D-3) 25 MCG (1000 UT) capsule Take  by mouth.   Past Week   • Coenzyme Q10 200 MG capsule Take 200 mg by mouth Daily. 90 capsule 1 Past Week   • Multiple Vitamins-Minerals (CENTRUM ADULTS) tablet Take 1-2 tablets by mouth daily.   Past Week   • oxybutynin XL (DITROPAN-XL) 5 MG 24 hr tablet  Take 1 tablet by mouth Every 12 (Twelve) Hours. (Patient taking differently: Take 5 mg by mouth every night at bedtime.) 180 tablet 1 Past Week   • sildenafil (VIAGRA) 100 MG tablet Take 0.5-1 tablets by mouth as needed.   Past Week   • tamsulosin (FLOMAX) 0.4 MG capsule 24 hr capsule TAKE 1 CAPSULE EVERY 12 HOURS (Patient taking differently: 2 capsules every night at bedtime.) 180 capsule 1 Past Week   • Zinc 50 MG capsule Take  by mouth.   Past Week   • rivaroxaban (XARELTO) 20 MG tablet Take 20 mg by mouth Daily.   10/14/2020         History:   Past Medical History:   Diagnosis Date   • Basal cell carcinoma    • Blood loss     post op   • ED (erectile dysfunction)     Responsive to Viagra   • Failed total knee, right (CMS/HCC)    • Fasting hypoglycemia 2016    Hemoglobin A1c normal   • Generalized osteoarthritis    • GERD (gastroesophageal reflux disease)     History esophageal stricture   • History of blood clots    • HNP (herniated nucleus pulposus), lumbar 1988, 2014    L5 L6   • Hyperglycemia    • Hyperlipidemia    • Hypertension    • Microscopic colitis 2017    Positive biopsy - colonoscopy   • OAB (overactive bladder) 2000    Persistent urgency   • Obesity Adulthood    2012 body weight 244 BMI 32   • Painful total knee replacement (CMS/HCC)    • Paroxysmal atrial fibrillation (CMS/HCC) 1991    one episode - cardioverted   • Prostatism 2014   • Pulmonary embolism (CMS/HCC) 1991    after injury and protracted immobilization   • Right knee DJD 2015    Partial knee arthroplasty   • Shoulder dislocation     Repeated episodes   • Sleep disturbances 1990    Frequently requires medication   • Transient cerebral ischemia 2010, 2016    Negative medical workup on both occasions   • Venous stasis    • Vertigo    • Wears glasses      Past Surgical History:   Procedure Laterality Date   • CARDIOVERSION  1991    Atrial fibrillation   • COLONOSCOPY      2018   • KNEE ARTHROPLASTY Right 2015    Partial    • KNEE  "ARTHROPLASTY Left    • KNEE ARTHROSCOPY Right 2015    Failed procedure   • LUMBAR DISC SURGERY  2014    Surgery ×2 Ruptured disc   • LUMBAR DISCECTOMY      L5 L6      Family History   Problem Relation Age of Onset   • Dementia Mother          age 94   • Other Father          age 86 of unknown cause   • Rheum arthritis Father    • Coronary artery disease Brother    • No Known Problems Brother    • Sick sinus syndrome Other         Has pacemaker   • Diabetes Paternal Grandmother      Social History     Tobacco Use   • Smoking status: Never Smoker   • Smokeless tobacco: Never Used   Substance Use Topics   • Alcohol use: Yes     Comment: Occasionally   • Drug use: No   He is  with 5 children.  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    Review of Systems  Pertinent items are noted in HPI, all other systems reviewed and negative    Vital Signs  /86 (BP Location: Right arm, Patient Position: Sitting)   Pulse 59   Temp 97.7 °F (36.5 °C) (Temporal)   Resp 20   Ht 186.7 cm (73.5\")   Wt 112 kg (248 lb)   SpO2 95%   BMI 32.28 kg/m²     Physical Exam:    General Appearance:    Alert, cooperative, in no acute distress   Head:    Normocephalic, without obvious abnormality, atraumatic   Eyes:            Lids and lashes normal, conjunctivae and sclerae normal, no   icterus, no pallor, corneas clear,    Ears:    Ears appear intact with no abnormalities noted   Throat:   No oral lesions, no thrush, oral mucosa moist   Neck:   No adenopathy, supple, trachea midline, no thyromegaly    Lungs:     Clear to auscultation,respirations regular, even and unlabored    Heart:    Regular rhythm and normal rate, normal S1 and S2, no murmur, no gallop   Abdomen:     Normal bowel sounds, no masses, no organomegaly, soft non-tender, non-distended, no guarding, no rebound  tenderness   Genitalia:    Deferred   Extremities:  LUE sling, nerve block present.  Bulky surgical dressing clean dry intact.  Hemovac present.  Good " movement and cap refill left digits, some numbness due to nerve block   Pulses:   Pulses palpable and equal bilaterally   Skin:   No bleeding, bruising or rash   Neurologic:   Cranial nerves 2 - 12 grossly intact.         I reviewed the patient's new clinical results.       Lab Results   Component Value Date    HGBA1C 5.10 09/14/2020     Results for HOSTCAITLYN (MRN [id number]) as of 10/19/2020 12:45   Ref. Range 9/14/2020 12:57   Glucose Latest Ref Range: 65 - 99 mg/dL 101 (H)   Sodium Latest Ref Range: 136 - 145 mmol/L 139   Potassium Latest Ref Range: 3.5 - 5.2 mmol/L 4.6   CO2 Latest Ref Range: 22.0 - 29.0 mmol/L 28.0   Chloride Latest Ref Range: 98 - 107 mmol/L 101   Anion Gap Latest Ref Range: 5.0 - 15.0 mmol/L 10.0   Creatinine Latest Ref Range: 0.76 - 1.27 mg/dL 1.07   BUN Latest Ref Range: 8 - 23 mg/dL 11   BUN/Creatinine Ratio Latest Ref Range: 7.0 - 25.0  10.3   Calcium Latest Ref Range: 8.6 - 10.5 mg/dL 9.0   eGFR Non  Am Latest Ref Range: >60 mL/min/1.73 66   Alkaline Phosphatase Latest Ref Range: 39 - 117 U/L 71   Total Protein Latest Ref Range: 6.0 - 8.5 g/dL 6.2   ALT (SGPT) Latest Ref Range: 1 - 41 U/L 20   AST (SGOT) Latest Ref Range: 1 - 40 U/L 26   Total Bilirubin Latest Ref Range: 0.0 - 1.2 mg/dL 0.6   Albumin Latest Ref Range: 3.50 - 5.20 g/dL 4.10   Globulin Latest Units: gm/dL 2.1   A/G Ratio Latest Units: g/dL 2.0   Hemoglobin A1C Latest Ref Range: 4.80 - 5.60 % 5.10   Protime Latest Ref Range: 11.5 - 14.0 Seconds 13.8   INR Latest Ref Range: 0.85 - 1.16  1.08   PTT Latest Ref Range: 24.0 - 37.0 seconds 30.5   WBC Latest Ref Range: 3.40 - 10.80 10*3/mm3 7.39   RBC Latest Ref Range: 4.14 - 5.80 10*6/mm3 4.63   Hemoglobin Latest Ref Range: 13.0 - 17.7 g/dL 14.7   Hematocrit Latest Ref Range: 37.5 - 51.0 % 45.2   RDW Latest Ref Range: 12.3 - 15.4 % 13.3   MCV Latest Ref Range: 79.0 - 97.0 fL 97.6 (H)   MCH Latest Ref Range: 26.6 - 33.0 pg 31.7   MCHC Latest Ref Range: 31.5 - 35.7  g/dL 32.5   MPV Latest Ref Range: 6.0 - 12.0 fL 10.7   Platelets Latest Ref Range: 140 - 450 10*3/mm3 150       Assessment and Plan:     S/P reverse total shoulder arthroplasty, left    Primary localized osteoarthrosis of left shoulder region    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     Plan  1. PT/OT-LUE sling, NWB  2. Pain control-prns, interscalene block   3. IS-encourage  4. DVT proph- [facility].  Resume Xarelto on 10/22 per Dr. Miranda  5. Bowel regimen  6. Resume home medications as appropriate  7. Monitor post-op labs  8. DC planning for home    HTN, Hyperlipidemia, afib  - Continue home atenolol  - Monitor BP   - Holding parameters for BP meds  - Labetalol PRN for SBP>170    BPH hx.  -resume Flomax, watch for postop urinary retention.  -I/o cath as needed. NANI Roman  10/19/20  13:29 EDT     I have personally performed the evaluation on this patient. My history is consistent  with HPI obtained. My exam findings are listed above. I have personally reviewed and discussed the above formulated treatment plan with pt, RN, and  RUSLAN. NANI.

## 2020-10-19 NOTE — H&P
Patient Care Team:      Chief complaint  Left shoulder pain    Subjective:    Patient is a 84 y.o.male presents with a history of six dislocations of his left shoulder over his life time, last was about twenty five years ago.  Currently having left shoulder pain, can't sleep because of it, injections are no longer providing relief.  Review of Systems:  General ROS: negative  Cardiovascular ROS: no chest pain or dyspnea on exertion  Hx of one episode of Afib, cardioverted  Respiratory ROS: no cough, shortness of breath, or wheezing  Hx PE years ago       Allergies:   Allergies   Allergen Reactions   • Pravastatin Other (See Comments)     Nocturnal leg cramps   • Atorvastatin      Fatigue   • Qsymia [Phentermine-Topiramate] Mental Status Change   • Topiramate Myalgia          Latex: neg   Contrast Dye neg    Home Meds    Medications Prior to Admission   Medication Sig Dispense Refill Last Dose   • ascorbic acid (VITAMIN C) 1000 MG tablet Take 1 capsule by mouth Daily.   Past Week   • atenolol (TENORMIN) 50 MG tablet TAKE 1 TABLET BY MOUTH DAILY. 90 tablet 1 10/18/2020   • benzoyl peroxide 5 % external liquid Apply to shoulder daily for 3 days prior to surgery. 148 g 0 10/19/2020   • CALCIUM-MAGNESIUM-ZINC PO Take 1 tablet by mouth Daily.   Past Week   • Cholecalciferol (VITAMIN D-3) 25 MCG (1000 UT) capsule Take  by mouth.   Past Week   • Coenzyme Q10 200 MG capsule Take 200 mg by mouth Daily. 90 capsule 1 Past Week   • Multiple Vitamins-Minerals (CENTRUM ADULTS) tablet Take 1-2 tablets by mouth daily.   Past Week   • oxybutynin XL (DITROPAN-XL) 5 MG 24 hr tablet Take 1 tablet by mouth Every 12 (Twelve) Hours. (Patient taking differently: Take 5 mg by mouth every night at bedtime.) 180 tablet 1 Past Week   • sildenafil (VIAGRA) 100 MG tablet Take 0.5-1 tablets by mouth as needed.   Past Week   • tamsulosin (FLOMAX) 0.4 MG capsule 24 hr capsule TAKE 1 CAPSULE EVERY 12 HOURS (Patient taking differently: 2 capsules  every night at bedtime.) 180 capsule 1 Past Week   • Zinc 50 MG capsule Take  by mouth.   Past Week   • mupirocin (BACTROBAN) 2 % ointment 1 application into the nostril(s) as directed by provider 2 (Two) Times a Day. Apply a pea-sized amount to each nostril twice daily for 5 days prior to surgery. 22 g 0    • rivaroxaban (XARELTO) 20 MG tablet Take 20 mg by mouth Daily.   10/14/2020     PMH:   Past Medical History:   Diagnosis Date   • Basal cell carcinoma    • Blood loss     post op   • ED (erectile dysfunction)     Responsive to Viagra   • Failed total knee, right (CMS/HCC)    • Fasting hypoglycemia 2016    Hemoglobin A1c normal   • Generalized osteoarthritis    • GERD (gastroesophageal reflux disease)     History esophageal stricture   • History of blood clots    • HNP (herniated nucleus pulposus), lumbar 1988, 2014    L5 L6   • Hyperglycemia    • Hyperlipidemia    • Hypertension    • Microscopic colitis 2017    Positive biopsy - colonoscopy   • OAB (overactive bladder) 2000    Persistent urgency   • Obesity Adulthood    2012 body weight 244 BMI 32   • Painful total knee replacement (CMS/HCC)    • Paroxysmal atrial fibrillation (CMS/HCC) 1991    one episode - cardioverted   • Prostatism 2014   • Pulmonary embolism (CMS/HCC) 1991    after injury and protracted immobilization   • Right knee DJD 2015    Partial knee arthroplasty   • Shoulder dislocation     Repeated episodes   • Sleep disturbances 1990    Frequently requires medication   • Transient cerebral ischemia 2010, 2016    Negative medical workup on both occasions   • Venous stasis    • Vertigo    • Wears glasses      PSH:    Past Surgical History:   Procedure Laterality Date   • CARDIOVERSION  1991    Atrial fibrillation   • COLONOSCOPY      2018   • KNEE ARTHROPLASTY Right 2015    Partial    • KNEE ARTHROPLASTY Left    • KNEE ARTHROSCOPY Right 2015    Failed procedure   • LUMBAR DISC SURGERY  2014    Surgery ×2 Ruptured disc   • LUMBAR DISCECTOMY  1988     "L5 L6      Immunization History: pneumo 20972   Flu  2020  Tetanus 2020  Social History:   Tobacco neg   Alcohol rarely      Physical Exam:/82 (BP Location: Right arm, Patient Position: Lying)   Pulse 60   Temp 97.3 °F (36.3 °C) (Temporal)   Resp 18   Ht 186.7 cm (73.5\")   Wt 112 kg (248 lb)   SpO2 96%   BMI 32.28 kg/m²       General Appearance:    Alert, cooperative, no distress, appears stated age   Head:    Normocephalic, without obvious abnormality, atraumatic   Lungs:     Clear to auscultation bilaterally, respirations unlabored    Heart: Regular rate and rhythm, S1 and S2 normal, no murmur, rub    or gallop    Abdomen:    Soft without tenderness   Breast Exam:    deferred   Genitalia:    deferred   Extremities:   Extremities normal, atraumatic, no cyanosis or edema   Skin:   Skin color, texture, turgor normal, no rashes or lesions   Neurologic:   Grossly intact     Results Review:   LABS:  Lab Results   Component Value Date    WBC 7.39 09/14/2020    HGB 14.7 09/14/2020    HCT 45.2 09/14/2020    MCV 97.6 (H) 09/14/2020     09/14/2020    NEUTROABS 4.82 09/14/2020    GLUCOSE 101 (H) 09/14/2020    BUN 11 09/14/2020    CREATININE 1.07 09/14/2020    EGFRIFNONA 66 09/14/2020     09/14/2020    K 4.6 09/14/2020     09/14/2020    CO2 28.0 09/14/2020    CALCIUM 9.0 09/14/2020    ALBUMIN 4.10 09/14/2020    AST 26 09/14/2020    ALT 20 09/14/2020    BILITOT 0.6 09/14/2020       RADIOLOGY:  Imaging Results (Last 72 Hours)     ** No results found for the last 72 hours. **            Cancer Patient: __ yes __no __unknown; If yes, clinical stage T:__ N:__M:__, stage group    Impression: osteoarthritis left shoulder  Severe B3 Glenoid, bone loss, posterior subluxation    LEFT RSA    I agree with above - Ruben  Held anticoagulation, hx DVT LLE, PE in past; d/w PCPs    Plan: reverse left total arthtroplasty  Sharron Campos PA-C 10/19/2020 08:32 EDT        "

## 2020-10-19 NOTE — ANESTHESIA POSTPROCEDURE EVALUATION
Patient: Alin Lara    Procedure Summary     Date: 10/19/20 Room / Location:  [facility]    Anesthesia Start: 0926 Anesthesia Stop: 1203    Procedure: TOTAL SHOULDER REVERSE ARTHROPLASTY (Left Shoulder) Diagnosis:       Primary localized osteoarthrosis of left shoulder region      Contracture of joint of left shoulder region      Biceps tendinitis of left upper extremity      (Primary localized osteoarthrosis of left shoulder region [M19.012])      (Contracture of joint of left shoulder region [M24.512])      (Biceps tendinitis of left upper extremity [M75.22])    Surgeon: Tan Miranda MD Provider: Henry Jennings MD    Anesthesia Type: general ASA Status: 3          Anesthesia Type: general    Vitals  Vitals Value Taken Time   /86 10/19/20 1200   Temp     Pulse 60 10/19/20 1202   Resp     SpO2 93 % 10/19/20 1202   Vitals shown include unvalidated device data.        Post Anesthesia Care and Evaluation    Patient location during evaluation: PACU  Patient participation: complete - patient participated  Level of consciousness: awake and alert  Pain score: 0  Pain management: adequate  Airway patency: patent  Anesthetic complications: No anesthetic complications  PONV Status: none  Cardiovascular status: hemodynamically stable and acceptable  Respiratory status: nonlabored ventilation, acceptable and nasal cannula  Hydration status: acceptable

## 2020-10-19 NOTE — PLAN OF CARE
Goal Outcome Evaluation:  Plan of Care Reviewed With: patient  Progress: improving  Outcome Summary: Pt admitted from PACU. Alert and oriented, VSS. On room air. Will continue to montior.

## 2020-10-19 NOTE — ANESTHESIA PROCEDURE NOTES
Peripheral Block    Pre-sedation assessment completed: 10/19/2020 8:45 AM    Patient reassessed immediately prior to procedure    Patient location during procedure: pre-op  Start time: 10/19/2020 8:45 AM  Stop time: 10/19/2020 8:54 AM  Reason for block: at surgeon's request and post-op pain management  Performed by  Anesthesiologist: Henry Jennings MD  Preanesthetic Checklist  Completed: patient identified, site marked, surgical consent, pre-op evaluation, timeout performed, IV checked, risks and benefits discussed and monitors and equipment checked  Prep:  Pt Position: right lateral decubitus  Sterile barriers:cap, gloves, mask and sterile barriers  Prep: ChloraPrep  Patient monitoring: blood pressure monitoring, continuous pulse oximetry and EKG  Procedure  Sedation:yes  Performed under: local infiltration  Guidance:ultrasound guided  ULTRASOUND INTERPRETATION. Using ultrasound guidance a gauge needle was placed in close proximity to the brachial plexus nerve, at which point, under ultrasound guidance anesthetic was injected in the area of the nerve and spread of the anesthesia was seen on ultrasound in close proximity thereto.  There were no abnormalities seen on ultrasound; a digital image was taken; and the patient tolerated the procedure with no complications. Images:still images obtained, printed/placed on chart    Laterality:left  Block Type:interscalene  Injection Technique:catheter  Needle Type:Tuohy and echogenic  Needle Gauge:18 G  Resistance on Injection: none  Catheter Size:20 G (20g)  Cath Depth at skin: 7 cm    Medications Used: fentaNYL citrate (PF) (SUBLIMAZE) injection, 100 mcg      Post Assessment  Injection Assessment: negative aspiration for heme, no paresthesia on injection and incremental injection  Patient Tolerance:comfortable throughout block  Complications:no  Additional Notes  Procedure:                 The pt was placed in semifowlers position with a slight tilt of the thorax  contralateral to the insertion site.  The Insertion Site was prepped and draped in sterile fashion.  The pt was anesthetized with  IV Sedation( see meds) and  Skin and cutaneous tissue was infiltrated and anesthetized with 1% Lidocaine 3 mls via a 25g needle.  Utilizing ultrasound guidance, a BBraun 2 inch 18 g Contiplex echogenic touhy needle was advanced in-plane.  Hydro dissection of tissue was achieved with Normal saline. Major vessels(carotid and Internal Jugular) where visualized as the brachial plexus was approached at the approximate level of C-7/ T-1.  Cervical 5 and Branches of Cervical 6 nerve roots where visualized and the needle tip was placed posterior at the level of C-6 roots.  LA spread was visualized and injection was made incrementally every 5 mls with aspiration. Injection pressure was normal or little, there was no intraneural injection, no vascular injection.      The BBraun 20 g wire stylet  catheter was then placed under US guidance on the posterior aspect of the Brachial Plexus.  Location of catheter was confirmed with NS injection visualized with US . The tuohy was then removed and the skin was sealed with Skin AFix at catheter insertion site.  Skin was prepped with mastisol and the labeled catheter  was secured with steristrips and a CHG tegaderm. Thank You.

## 2020-10-19 NOTE — ANESTHESIA PREPROCEDURE EVALUATION
Anesthesia Evaluation                  Airway   Mallampati: I  TM distance: >3 FB  Neck ROM: full  No difficulty expected  Dental      Pulmonary    (+) pulmonary embolism,   Cardiovascular     ECG reviewed    (+) hypertension, valvular problems/murmurs MR and TI, dysrhythmias,     ROS comment: Echo acceptable ef, tr, mr both mild    Neuro/Psych  (+) TIA, dizziness/light headedness,     GI/Hepatic/Renal/Endo    (+) obesity,       Musculoskeletal     Abdominal    Substance History      OB/GYN          Other                        Anesthesia Plan    ASA 3     general   (Isnb/c)  intravenous induction     Anesthetic plan, all risks, benefits, and alternatives have been provided, discussed and informed consent has been obtained with: patient.    Plan discussed with CRNA.

## 2020-10-19 NOTE — OP NOTE
SIDE: LEFT SHOULDER    Date of Surgery: 10/19/2020    Location: [place], [place]    Surgeon: Tan Miranda M.D.       Assistant: Hugh Posada MD    The skilled assistance of the above noted first assistant was necessary during this complex surgical procedure.  The surgical assistant assisted with every aspect of the operation including, but not limited to, proper and safe positioning of the patient, obtaining adequate surgical exposure, manipulation of surgical instruments, suture management, surgical knot tying when necessary, the continual process of hemostasis during the procedure itself in addition to surgical wound closure and removal of the patient from the operating table and returning the patient back to the [facility].  The assistance of the surgical assistant allowed me to perform the most sensitive and technical potions of this operation using 2 hands, thus enhancing efficiency and patient safety.  This would not be possible without the help of a skilled assistant familiar with the procedure and capable of safely performing the aforementioned tasks.      Anesthesia:   General endotracheal anesthesia    Block:  Regional interscalene block by anesthesia team with indwelling catheter    Pre-operative Diagnosis                          1. Severe primary glenohumeral joint osteoarthritis with B3 glenoid, posterior glenoid bone loss  2. Shoulder biceps tendinopathy    Post Operative Diagnosis  1. same    Operative Procedure         1. REVERSE shoulder arthroplasty Reverse total shoulder arthroplasty CPT code 66865  2. Biceps tenodesis    Intraoperative Complications:  None    Estimated Blood Loss:  150mL    Drain: YES, medium hard hemovac drain    Urine Output: per anesthesia records    Specimen: Humeral head resected as part of shoulder replacement, no specimen sent    Antibiotics:   IV Cefazolin infused prior to incision  IV Vancomycin infused prior to  incision      Preoperative Walch Classification: B3  Preoperative Favard Classification: E3    Bone Quality: good but glenoid medialization and posterior and superior glenoid bone loss      Rotator Cuff (Intraoperative findings):  Intact, thin tissue    Biceps Tendon: Intact  Prior or spontaneous tenodesis: No  Abnormal (frayed synovitis): Yes  Biceps tenodesis was performed as part of the subscapularis repair    Subscapularis Repair Performed: Yes  Subscapularis repair type:   Transosseous Suture  Tendon to Tendon with three #2 FiberWire sutures    Before Procedure EUA - Forward Flexion/Abduction/ER at side/ER at 90/IR at 90:  Severe glenohumeral joint contracture    After Procedure/after subscapularis repair - Forward Flexion/Abduction/ER at side/ER at 90/IR at 90:  130/130/60/70/80    Stem Type  Aequalis Ascend Flex Reverse  PTC (Pure Titanium Coating): Yes  Size: 6 a  Angle: A  Tray: CENTERED offset, +0mm  Insert: 39mm diameter, +6mm Angle C, non-retentive  Index: N/A  Retroversion (measured by version leena): 30 degrees  135 degree construct angle    Glenosphere  Perform reverse 25 mm full wedge was prepared.  Short post 7 mm.  39 mm standard glenosphere centered impacted into place and final tightened.    Time out: Time out was called and the patient, side, site, and intended procedure was confirmed.    Counts: Needle, lap sponge, and ray-neelam sponge counts were correct prior to closure.    DVT prophylaxis: Perioperative DVT prophylaxis with sequential compression devices (SCDs) on both lower extremities.  Patient will be weight bearing as tolerated on bilateral lower extremities postoperatively with no additional chemical DVT prophylaxis.    Marked: The patient was marked with an indelible marker in the preoperative holding area confirming the correct site and side.    History & Physical:   A history and physical examination was completed and updated in the preoperative holding area.    Consent: The patient  signed the consent form for surgery with an understanding of the risks and benefits as outlined in the clinic and in the preoperative holding area.    Risks and Benefits:   Need for blood transfusion; medical complications with anesthesia/surgery including deep venous thrombosis, stroke, myocardial infarction, and death; potential complications if block performed; infection; revision surgery; dislocation/instability; fracture; failure of hardware; nerve or blood vessel injury; incomplete pain relief; incomplete restoration of shoulder range of motion; possibility of resection arthroplasty if procedure failed.    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  He remains on Xarelto had to be off of this for 3 days before and 3 days after patient understood the risks and benefits.  He had a history of a PE in the past and then a left knee replacement and subsequent DVT within the last 9 to 12 months.    Reason for Assistant:  Assistant was needed with patient positioning, draping, patient arm positioning and holding throughout the case, humeral and glenoid exposure, assistance with skin closure, dressing, and sling placement.    Indications for surgery: The patient has persistent pain interfering with activities of daily living unresponsive to a non-operative care program of selective rest, activity modification, medications and exercises.  He dealt with the shoulder for many years the pain is worsening and interfering with sleep activities of daily living and desire to proceed.    He works diligently to get proper medical clearances I have discussed with both of his prior primary care providers and his current primary care provider.  We received clearance from Dr. Grider his cardiologist and he also had a positive Covid test and was ultimately Covid Covid antibody positive and waited the obligatory and  recommended timeframe prior to proceeding with surgery.    Operative Findings     Severe glenohumeral osteoarthritis with severe posterior and superior glenoid wear.  Posterior glenoid bone loss.  Stable examination under anesthesia    Reverse Shoulder Arthroplasty Procedure in Detail:   The patient was seen and identified in the preoperative area.  The operative extremity was marked with an indelible marker.  The patient was examined and an updated history and physical was signed in the chart.  The patient understood the risks and benefits of the surgery and elected to proceed with surgery with reverse shoulder arthroplasty. The patient was taken to the operating room, placed on the operating table in the supine position where general anesthesia was administered.  The patient was positioned in a modified beach chair position.  All bony prominences were well padded.  The neck was secured and padded in neutral position.  Examination under anesthesia did not reveal any pathologic laxity of the shoulder and just revealed stiffness associated with the disease.  Range of motion measurements under anesthesia, but prior to surgery are noted above for forward flexion/abduction/external rotation at the side/external rotation at 90 degrees abduction/internal rotation at 90 degrees abduction. Next, the upper extremity and shoulder girdle region were cleaned with isopropyl alcohol and the prepped and draped in usual sterile fashion.  An iodine-impregnated dressing was placed to ensure no skin edges were exposed.  A fresh pair of surgical gloves was placed following placement of the iodine-impregnated dressing.  The surgical incision was planned using a skin marker extending from the tip of the coracoid process along the deltopectoral interval.  A standard deltopectoral approach was made.  A #10 blade was used for the skin followed by electrocautery to maintain hemostasis.  Two large skin rakes were used to provide counter  traction for tissue dissection.  The fat stripe overlying the cephalic vein was identified using Metzenbaum scissors and Debakey forceps.  The vein was bluntly retracted laterally with the deltoid using an Army-Navy retractor and an Army-Navy retractor was used medially to retract the pectoralis major.  The upper border of the pectoralis major tendon was identified and the upper 1.5cm of the pectoralis major tendon was released to improve humeral mobility and allow better visualization of the anterior circumflex vessels for eventual suture ligation.  The [employer]-Navy retractors were replaced with an Adson cerebellar self-retaining retractor.  Curved heavy Zimmer scissors were used to open up the space above the coracoid process and a Hohmann retractor was placed above the coracoid process for improved visualization.  The operative extremity was abducted and externally rotated by the surgical assistant to better expose the coracoacromial (CA) ligament.  Electrocautery was utilized to release the clavipectoral fascia and the CA ligament to improve exposure with the superiorly migrated humeral head and continued superficially just lateral to the conjoined tendon. The conjoined tendon was retracted medially with a narrow Gallego retractor, while being aware of the underlying musculocutaneous nerve, and the operative extremity was externally rotated to expose the anterior humeral circumflex vessels.  The anterior humeral circumflex vessels were suture ligated with dyed 0-Vicryl suture ligation sutures placed in an interrupted fashion.  The axillary nerve was palpated and protected throughout the case. Interrupted Ethibond traction sutures were placed in the subscapularis. The upper rolled border of the subscapularis tendon was identified.  Curved heavy Zimmer scissors were used to enter tangential to the superior rolled border of the subscapularis tendon.  Heavy forceps were used help identify the anatomic neck as the upper  two-thirds of the subscapularis tenotomy was completed sharply along the anatomic neck with a fresh #10 blade on a long handle.  Electrocautery was used to complete the tenotomy of the lower one-third of the subscapularis tendon and to cauterize the anterior humeral circumflex vessels between the two prior placed ligation sutures.  The dissection was carried out remaining directly on bone to release the capsular attachments along the medial calcar of the humerus and the dissection stopped proximal to the attachment of the latissimus dorsi tendon.  At this point a modified humeral head retractor (modified-Trillat retractor) was used to retract the intact humeral head posteriorly.  The modified humeral head retractor was placed by externally rotating the humerus and then inserting the retractor while transitioning to internal rotation.  Subscapularis release was performed releasing the superior, middle, and inferior glenohumeral ligaments taking care to protect the axillary nerve during release of the inferior glenohumeral ligament.  Next the inferior joint capsule was released off the glenoid staying directly on bone, once again protecting the axillary nerve. The proximal humerus was then dislocated.  The status of the rotator cuff and long head of the biceps were determined and noted above.  The intraarticular portion of the biceps was resected.  Next, crown osteophytes were removed off the humeral head -very large humeral osteophytes.  Humeral head osteotomy was performed along the anatomical neck of the humerus and approximated with the cut guide.  The proximal humerus was then prepared using the reverse total shoulder system and stem diameter stem, neck inclination angle, tray size, poly size, and tray index are listed above. The cut protector was placed.  The proximal humerus was then retracted posteriorly using the posterior glenoid retractor.     The glenoid was prepared.  I utilized a guide and then  subsequently a guide  to ultimately placed the guidepin.  Then compared this to the preoperative plan as well.  Started with posterior corrective reaming and also to take away some of the anterior glenoid osteophyte.  This process was repeated multiple times in order to get a nice flush and corrective augment.  I ultimately decided go with the 25mm baseplate versus the 29 mm a 25 mm fit really well and given his bone loss gave us potential for better screw purchase.  The glenosphere baseplate component was then impacted into place.  A bone tamp was used to final seat and double-checked with a Debakey to ensure it was down against the bone.  The inferior and superior drill holes were drilled and locking screws placed sequentially, but not completely seated until compression screw was placed.  The compression screw was placed in the posterior and superior drill hole at the area of the wedge.  The additional drill holes were drilled and screws were tightened with excellent fixation.  The locking screws were then seated and final-tightened.  The glenosphere (size and offset noted above) was impacted into place and final-tightened with complete seating.  The proximal humerus was re-dislocated using a bone hook to prevent damage to the freshly cemented glenosphere component and prevent damage to the greater tuberosity.  The cut protector was removed.  The trial humeral components were placed and no instability with acceptable range of motion was noted.  The trial components were removed and the final sizes were noted for the actual implants.  The wound was copiously irrigated including the humeral canal.  Three #2 transosseous non-absorbable sutures were placed through the stump of subscapularis and through the lesser tuberosity.  The final humeral implant was impacted in place.  The glenohumeral joint was reduced.  There was excellent range of motion and no instability, with appropriate pistoning noted. The  subscapularis was repaired using the transosseous and transtendinous #2 high-tensile non-absorbable sutures. At the termination of this closure, there was acceptable range of motion noted above.  The wound was then irrigated thoroughly and hemostasis was obtained.  The biceps was suture tenodesed to the pectoralis major tendon in an interrupted fashion as part of the open biceps tenodesis.  A hard hemovac drain was placed deeply.  The deep tissue was reapproximated with 0 Vicryl in interrupted fashion.  Subcutaneous tissue was reapproximated with 2-0 Vicryl in interrupted fashion.  Skin was reapproximated with 3-0 Monocryl in continuous running subcuticular fashion.  Steri-Strips, sterile dressing, and sling were applied.  The patient tolerated the procedure well and was transferred to the recovery room in satisfactory condition.

## 2020-10-19 NOTE — BRIEF OP NOTE
TOTAL SHOULDER REVERSE ARTHROPLASTY  Progress Note    Alin Lara  10/19/2020    Pre-op Diagnosis:   Primary localized osteoarthrosis of left shoulder region [M19.012]  Contracture of joint of left shoulder region [M24.512]  Biceps tendinitis of left upper extremity [M75.22]       Post-Op Diagnosis Codes:     * Primary localized osteoarthrosis of left shoulder region [M19.012]     * Contracture of joint of left shoulder region [M24.512]     * Biceps tendinitis of left upper extremity [M75.22]    Procedure/CPT® Codes:        Procedure(s):  TOTAL SHOULDER REVERSE ARTHROPLASTY    Surgeon(s):  Gama Posada MD Morris, Brent Joseph, MD    Anesthesia: General with Block    Staff:   Circulator: Mendez Duong RN; Thais Astudillo RN; Shanice Antonio RN  Physician Assistant: Leeanna Grier PA-C  Scrub Person: Tess Vizcarra; Thais Vizcarra; Jayden Roland  Vendor Representative: Edvin Reyes  Nursing Assistant: Noemi Call PCT  Assistant: Judy Perez PA  Assistant: Judy Perez PA      Estimated Blood Loss: 100ml    Urine Voided: * No values recorded between 10/19/2020  9:26 AM and 10/19/2020 11:49 AM *    Specimens:                A: humeral head resected - severe OA; not sent          Drains:   Closed/Suction Drain 1 Left;Anterior Shoulder Accordion (Active)       Findings: severe GH joint OA, B3 glenoid    Complications: none    Assistant: Hugh Posada MD  was responsible for performing the following activities: Retraction, Suction and Irrigation and their skilled assistance was necessary for the success of this case.    Tan Miranda MD     Date: 10/19/2020  Time: 11:54 EDT

## 2020-10-19 NOTE — ANESTHESIA PROCEDURE NOTES
Airway  Urgency: elective    Date/Time: 10/19/2020 9:37 AM  Airway not difficult    General Information and Staff    Patient location during procedure: OR  CRNA: Mohamud Palmer CRNA    Indications and Patient Condition  Indications for airway management: airway protection    Preoxygenated: yes  MILS not maintained throughout  Mask difficulty assessment: 1 - vent by mask    Final Airway Details  Final airway type: endotracheal airway      Successful airway: ETT  Cuffed: yes   Successful intubation technique: direct laryngoscopy  Endotracheal tube insertion site: oral  Blade: Yogesh  Blade size: 3  ETT size (mm): 7.0  Cormack-Lehane Classification: grade III - view of epiglottis only  Placement verified by: chest auscultation and capnometry   Cuff volume (mL): 8  Measured from: lips  ETT/EBT  to lips (cm): 20  Number of attempts at approach: 1  Assessment: lips, teeth, and gum same as pre-op and atraumatic intubation    Additional Comments  Negative epigastric sounds, Breath sound equal bilaterally with symmetric chest rise and fall

## 2020-10-20 DIAGNOSIS — I89.0 LYMPHEDEMA OF LEFT LOWER EXTREMITY: Primary | ICD-10-CM

## 2020-10-20 LAB
ANION GAP SERPL CALCULATED.3IONS-SCNC: 6 MMOL/L (ref 5–15)
BUN SERPL-MCNC: 14 MG/DL (ref 8–23)
BUN/CREAT SERPL: 16.3 (ref 7–25)
CA-I SERPL ISE-MCNC: 0.99 MMOL/L (ref 1.12–1.32)
CALCIUM SPEC-SCNC: 6.9 MG/DL (ref 8.6–10.5)
CHLORIDE SERPL-SCNC: 111 MMOL/L (ref 98–107)
CO2 SERPL-SCNC: 22 MMOL/L (ref 22–29)
CREAT SERPL-MCNC: 0.86 MG/DL (ref 0.76–1.27)
DEPRECATED RDW RBC AUTO: 50.6 FL (ref 37–54)
ERYTHROCYTE [DISTWIDTH] IN BLOOD BY AUTOMATED COUNT: 13.2 % (ref 12.3–15.4)
GFR SERPL CREATININE-BSD FRML MDRD: 85 ML/MIN/1.73
GLUCOSE SERPL-MCNC: 112 MG/DL (ref 65–99)
HCT VFR BLD AUTO: 43.5 % (ref 37.5–51)
HGB BLD-MCNC: 13.5 G/DL (ref 13–17.7)
MCH RBC QN AUTO: 32.4 PG (ref 26.6–33)
MCHC RBC AUTO-ENTMCNC: 31 G/DL (ref 31.5–35.7)
MCV RBC AUTO: 104.3 FL (ref 79–97)
PLATELET # BLD AUTO: 138 10*3/MM3 (ref 140–450)
PMV BLD AUTO: 10.9 FL (ref 6–12)
POTASSIUM SERPL-SCNC: 3.9 MMOL/L (ref 3.5–5.2)
RBC # BLD AUTO: 4.17 10*6/MM3 (ref 4.14–5.8)
SODIUM SERPL-SCNC: 139 MMOL/L (ref 136–145)
WBC # BLD AUTO: 15.02 10*3/MM3 (ref 3.4–10.8)

## 2020-10-20 PROCEDURE — 97530 THERAPEUTIC ACTIVITIES: CPT

## 2020-10-20 PROCEDURE — 25010000002 ONDANSETRON PER 1 MG: Performed by: NURSE PRACTITIONER

## 2020-10-20 PROCEDURE — 25010000002 CALCIUM GLUCONATE PER 10 ML: Performed by: INTERNAL MEDICINE

## 2020-10-20 PROCEDURE — 82330 ASSAY OF CALCIUM: CPT | Performed by: NURSE PRACTITIONER

## 2020-10-20 PROCEDURE — 80048 BASIC METABOLIC PNL TOTAL CA: CPT | Performed by: NURSE PRACTITIONER

## 2020-10-20 PROCEDURE — 85027 COMPLETE CBC AUTOMATED: CPT | Performed by: NURSE PRACTITIONER

## 2020-10-20 PROCEDURE — 25010000002 HYDROMORPHONE PER 4 MG: Performed by: ORTHOPAEDIC SURGERY

## 2020-10-20 PROCEDURE — 97110 THERAPEUTIC EXERCISES: CPT | Performed by: OCCUPATIONAL THERAPIST

## 2020-10-20 PROCEDURE — 99232 SBSQ HOSP IP/OBS MODERATE 35: CPT | Performed by: ORTHOPAEDIC SURGERY

## 2020-10-20 PROCEDURE — 97162 PT EVAL MOD COMPLEX 30 MIN: CPT

## 2020-10-20 PROCEDURE — 97165 OT EVAL LOW COMPLEX 30 MIN: CPT | Performed by: OCCUPATIONAL THERAPIST

## 2020-10-20 PROCEDURE — 97535 SELF CARE MNGMENT TRAINING: CPT | Performed by: OCCUPATIONAL THERAPIST

## 2020-10-20 PROCEDURE — 99024 POSTOP FOLLOW-UP VISIT: CPT | Performed by: ORTHOPAEDIC SURGERY

## 2020-10-20 PROCEDURE — 25010000002 ENOXAPARIN PER 10 MG: Performed by: ORTHOPAEDIC SURGERY

## 2020-10-20 RX ORDER — HYDROCODONE BITARTRATE AND ACETAMINOPHEN 10; 325 MG/1; MG/1
1 TABLET ORAL EVERY 4 HOURS PRN
Qty: 40 TABLET | Refills: 0 | Status: SHIPPED | OUTPATIENT
Start: 2020-10-20 | End: 2020-10-29

## 2020-10-20 RX ORDER — OXYMETAZOLINE HYDROCHLORIDE 0.05 G/100ML
2 SPRAY NASAL 2 TIMES DAILY
Status: DISCONTINUED | OUTPATIENT
Start: 2020-10-20 | End: 2020-10-21 | Stop reason: HOSPADM

## 2020-10-20 RX ORDER — POLYETHYLENE GLYCOL 3350 17 G/17G
17 POWDER, FOR SOLUTION ORAL DAILY
Status: DISCONTINUED | OUTPATIENT
Start: 2020-10-20 | End: 2020-10-21 | Stop reason: HOSPADM

## 2020-10-20 RX ORDER — SACCHAROMYCES BOULARDII 250 MG
250 CAPSULE ORAL 2 TIMES DAILY
Status: DISCONTINUED | OUTPATIENT
Start: 2020-10-20 | End: 2020-10-21 | Stop reason: HOSPADM

## 2020-10-20 RX ORDER — BISACODYL 10 MG
10 SUPPOSITORY, RECTAL RECTAL DAILY PRN
Status: DISCONTINUED | OUTPATIENT
Start: 2020-10-20 | End: 2020-10-21 | Stop reason: HOSPADM

## 2020-10-20 RX ORDER — HYDROCODONE BITARTRATE AND ACETAMINOPHEN 10; 325 MG/1; MG/1
1 TABLET ORAL EVERY 4 HOURS PRN
Qty: 40 TABLET | Refills: 0 | Status: SHIPPED | OUTPATIENT
Start: 2020-10-20 | End: 2020-10-20

## 2020-10-20 RX ADMIN — HYDROCODONE BITARTRATE AND ACETAMINOPHEN 1 TABLET: 10; 325 TABLET ORAL at 05:38

## 2020-10-20 RX ADMIN — HYDROCODONE BITARTRATE AND ACETAMINOPHEN 1 TABLET: 10; 325 TABLET ORAL at 11:13

## 2020-10-20 RX ADMIN — Medication 250 MG: at 15:50

## 2020-10-20 RX ADMIN — ENOXAPARIN SODIUM 40 MG: 40 INJECTION SUBCUTANEOUS at 09:49

## 2020-10-20 RX ADMIN — ACETAMINOPHEN 325 MG: 325 TABLET, FILM COATED ORAL at 10:07

## 2020-10-20 RX ADMIN — POLYETHYLENE GLYCOL 3350 17 G: 17 POWDER, FOR SOLUTION ORAL at 12:31

## 2020-10-20 RX ADMIN — HYDROMORPHONE HYDROCHLORIDE 0.5 MG: 1 INJECTION, SOLUTION INTRAMUSCULAR; INTRAVENOUS; SUBCUTANEOUS at 14:19

## 2020-10-20 RX ADMIN — Medication 2 SPRAY: at 20:36

## 2020-10-20 RX ADMIN — CALCIUM GLUCONATE 1 G: 98 INJECTION, SOLUTION INTRAVENOUS at 12:55

## 2020-10-20 RX ADMIN — Medication 2 SPRAY: at 12:31

## 2020-10-20 RX ADMIN — SODIUM CHLORIDE 75 ML/HR: 9 INJECTION, SOLUTION INTRAVENOUS at 11:13

## 2020-10-20 RX ADMIN — HYDROCODONE BITARTRATE AND ACETAMINOPHEN 1 TABLET: 10; 325 TABLET ORAL at 01:36

## 2020-10-20 RX ADMIN — ONDANSETRON 4 MG: 2 INJECTION INTRAMUSCULAR; INTRAVENOUS at 18:31

## 2020-10-20 RX ADMIN — TAMSULOSIN HYDROCHLORIDE 0.8 MG: 0.4 CAPSULE ORAL at 20:35

## 2020-10-20 RX ADMIN — OXYBUTYNIN CHLORIDE 5 MG: 5 TABLET, EXTENDED RELEASE ORAL at 20:35

## 2020-10-20 RX ADMIN — HYDROMORPHONE HYDROCHLORIDE 0.5 MG: 1 INJECTION, SOLUTION INTRAMUSCULAR; INTRAVENOUS; SUBCUTANEOUS at 18:23

## 2020-10-20 RX ADMIN — HYDROCODONE BITARTRATE AND ACETAMINOPHEN 1 TABLET: 10; 325 TABLET ORAL at 16:25

## 2020-10-20 RX ADMIN — CALCIUM GLUCONATE 1 G: 98 INJECTION, SOLUTION INTRAVENOUS at 20:36

## 2020-10-20 RX ADMIN — HYDROCODONE BITARTRATE AND ACETAMINOPHEN 1 TABLET: 10; 325 TABLET ORAL at 20:35

## 2020-10-20 NOTE — PLAN OF CARE
Goal Outcome Evaluation:  Plan of Care Reviewed With: patient  Progress: improving  Outcome Summary: Pts Bp dropped 90's/50's this AM. No shortness of breath. Saturation 88-92% room air. Arrow pump decreased to 4ml/hr. Pt worked with therapy this AM and after luch. Quad cane provided. Pt expirenced severe pain. PRNs administered. Arrow pump increased to 8ml/hr for 2 hours then decreased to 6ml/hr. BP and respiratory improved during the shift, WIll continue to monitor.

## 2020-10-20 NOTE — PLAN OF CARE
Problem: Adult Inpatient Plan of Care  Goal: Plan of Care Review  Recent Flowsheet Documentation  Taken 10/20/2020 1005 by Asia Mccarthy  Progress: no change  Plan of Care Reviewed With: patient  Outcome Summary: PT eval performed: Pt presenting s/p day 1 L shoulder arthroplasty with generalized weakness and SOA.  Education on L shoulder precautions, min-A bed mobility, CGA STS,Pt able to ambulate 25', min-A, RUE support and close chair follow.  Pt initially having issues with orthostatic BP upon standing (98/51mmHg) and had to wait for BP to improve.  Pt with resting SpO2 at 93% with tendency to drop to upper 80's with talking and mobility. Pt educated on importance of deferring ambulation until vitals improved but patient impulsive and began walking, was able to utilize close chair follow and min-A for 25' before convincing pt to sit.  O2 sats returned to 92% after 1 min sitting break.  Pt very eager to return home today but concern for safety until mobility is safer and vitals more stable.  Recommend home with assist and HH when medically stable and improved mobility.

## 2020-10-20 NOTE — PROGRESS NOTES
"IM progress note      Alin Lara  [id number]  1935     LOS: 1 day     Attending: Tan Miranda MD    Primary Care Provider: Henry Keller MD      Chief Complaint/Reason for visit:  No chief complaint on file.      Subjective   Some difficulties this morning pertaining to unsteady gait, some postoperative pain, O2 sats dropping under 90 requiring 2 L of oxygen.  Feeling well bit better now.  Has voided since last night.  Wife is present.  Morning has been somewhat overwhelming.  Hemovac has since been removed    Objective        Visit Vitals  /55   Pulse 58   Temp 98.3 °F (36.8 °C) (Oral)   Resp 18   Ht 186.7 cm (73.5\")   Wt 112 kg (248 lb)   SpO2 93%   BMI 32.28 kg/m²     Temp (24hrs), Av °F (36.7 °C), Min:97.4 °F (36.3 °C), Max:98.3 °F (36.8 °C)      Intake/Output:    Intake/Output Summary (Last 24 hours) at 10/20/2020 1153  Last data filed at 10/20/2020 0800  Gross per 24 hour   Intake 2601 ml   Output 950 ml   Net 1651 ml        Physical Therapy:10/20/2020 1005 by Asia Mccarthy  Progress: no change  Plan of Care Reviewed With: patient  Outcome Summary: PT eval performed: Pt presenting s/p day 1 L shoulder arthroplasty with generalized weakness and SOA.  Education on L shoulder precautions, min-A bed mobility, CGA STS,Pt able to ambulate 25', min-A, RUE support and close chair follow.  Pt initially having issues with orthostatic BP upon standing (98/51mmHg) and had to wait for BP to improve.  Pt with resting SpO2 at 93% with tendency to drop to upper 80's with talking and mobility. Pt educated on importance of deferring ambulation until vitals improved but patient impulsive and began walking, was able to utilize close chair follow and min-A for 25' before convincing pt to sit.  O2 sats returned to 92% after 1 min sitting break.  Pt very eager to return home today but concern for safety until mobility is safer and vitals more stable.  Recommend home with assist and HH when " medically stable and improved mobility.         Physical Exam:     General Appearance:    Alert, cooperative, in no acute distress   Head:    Normocephalic, without obvious abnormality, atraumatic    Lungs:     Normal effort, symmetric chest rise,  clear to      auscultation bilaterally              Heart:    Regular rhythm and normal rate, normal S1 and S2    Abdomen:     Normal bowel sounds, no masses, no organomegaly, soft        non-tender, non-distended, no guarding, no rebound                tenderness   Extremities:  Left UE in a sling, CDI bulky dressing over shoulder incision. Interscalene nerve block cath present.  Distal pulses, cap refill, movements of fingers, wrist, intact.    No clubbing, cyanosis or edema.  No deformities.  Noted for chronic swelling left leg/knee   Pulses:   Pulses palpable and equal bilaterally   Skin:   No bleeding, bruising or rash          Results Review:     I reviewed the patient's new clinical results.   Results from last 7 days   Lab Units 10/20/20  0506   WBC 10*3/mm3 15.02*   HEMOGLOBIN g/dL 13.5   HEMATOCRIT % 43.5   PLATELETS 10*3/mm3 138*     Results from last 7 days   Lab Units 10/20/20  0812   SODIUM mmol/L 139   POTASSIUM mmol/L 3.9   CHLORIDE mmol/L 111*   CO2 mmol/L 22.0   BUN mg/dL 14   CREATININE mg/dL 0.86   CALCIUM mg/dL 6.9*   GLUCOSE mg/dL 112*     Results for CAITLYN LOBATO (MRN [id number]) as of 10/20/2020 11:57   Ref. Range 10/20/2020 08:12   Ionized Calcium Latest Ref Range: 1.12 - 1.32 mmol/L 0.99 (L)       I reviewed the patient's new imaging including images and reports.    All medications reviewed.   atenolol, 50 mg, Oral, Daily  enoxaparin, 40 mg, Subcutaneous, Daily  oxybutynin XL, 5 mg, Oral, Nightly  oxymetazoline, 2 spray, Each Nare, BID  tamsulosin, 0.8 mg, Oral, Nightly      acetaminophen, 325 mg, Q8H PRN  diphenhydrAMINE, 25 mg, Q6H PRN    Or  diphenhydrAMINE, 25 mg, Q6H PRN  HYDROcodone-acetaminophen, 1 tablet, Q4H PRN  HYDROmorphone, 0.5  mg, Q2H PRN    And  naloxone, 0.1 mg, Q5 Min PRN  labetalol, 10 mg, Q4H PRN  ondansetron, 4 mg, Q6H PRN  phenol, 2 spray, Q2H PRN  senna-docusate sodium, 2 tablet, BID PRN  sodium chloride, 500 mL, TID PRN        Assessment/Plan       S/P reverse total shoulder arthroplasty, left    Paroxysmal atrial fibrillation (CMS/HCC)    Hyperlipidemia    Hypertension    History of pulmonary embolism (CMS/HCC)    Primary localized osteoarthrosis of left shoulder region    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      Plan  1. PT/OT-LUE sling, NWB  2. Pain control-prns, interscalene block , adjust for pain control, blood pressure and oxygenation as well.          3. IS-encourage  4. DVT proph- [facility].   Subcutaneous Lovenox Resume Xarelto on 10/22 per Dr. Miranda  5. Bowel regimen  6. Resume home medications as appropriate  7. Monitor post-op labs  8. DC planning for home, likely tomorrow.     HTN, Hyperlipidemia, afib  - Continue home atenolol/holding parameters  - Monitor BP   - Labetalol PRN for SBP>170     BPH hx.  -resume Flomax, watch for postop urinary retention.  -I/o cath as needed       I discussed with the patient, wife, and RN.    Chey Carballo MD  10/20/20  11:53 EDT

## 2020-10-20 NOTE — PROGRESS NOTES
Discharge Planning Assessment  [facility]     Patient Name: Alin Lara  MRN: [id number]  Today's Date: 10/20/2020    Admit Date: 10/19/2020    Discharge Needs Assessment     Row Name 10/20/20 1015       Living Environment    Lives With  spouse pt resides in [facility]    Name(s) of Who Lives With Patient  wife- Yamilet    Current Living Arrangements  home/apartment/condo    Primary Care Provided by  self    Provides Primary Care For  no one    Family Caregiver if Needed  spouse    Family Caregiver Names  Yamilet    Quality of Family Relationships  helpful;involved;supportive    Able to Return to Prior Arrangements  yes       Resource/Environmental Concerns    Resource/Environmental Concerns  none       Transition Planning    Patient/Family Anticipates Transition to  home with help/services;home with family    Patient/Family Anticipated Services at Transition  home health care    Transportation Anticipated  family or friend will provide       Discharge Needs Assessment    Readmission Within the Last 30 Days  no previous admission in last 30 days    Equipment Currently Used at Home  cane, straight;shower chair;walker, standard    Concerns to be Addressed  discharge planning    Anticipated Changes Related to Illness  none    Equipment Needed After Discharge  cane, quad    Outpatient/Agency/Support Group Needs  homecare agency    Discharge Facility/Level of Care Needs  home with home health    Provided Post Acute Provider List?  N/A    N/A Provider List Comment  pt already knows he wants to use [facility]    Provided Post Acute Provider Quality & Resource List?  N/A    Patient's Choice of Community Agency(s)  [facility]        Discharge Plan     Row Name 10/20/20 1016       Plan    Plan  home    Patient/Family in Agreement with Plan  yes    Plan Comments  CM spoke with pt and chavez Woodard at bedside. Pt resides in [facility] and is independent of adls with minimal assistance for  dressing. Pt reports he has a cane, standrad walker and shower chair at home he does not currently use. Pt's wife asking about quad cane, therapy to assess with cane for safety. Pt is not current with home health or outpatient services. Pt is requesting home health services and per Dr. Miranda' note pt can discharge with home health PT/ot services. CM has left [place] with [facility] a message to return call in regards to new referral. Pt wants to use [facility] services in [place] when time to transition. Pt denies additional needs and will have private transportation home.    Final Discharge Disposition Code  06 - home with home health care        Continued Care and Services - Admitted Since 10/19/2020     Home Medical Care Coordination complete    Service Provider Request Status Selected Services Address Phone Fax    [facility]   Selected Home Health Services [address] [facility] [address] [facility] [address] [phone]                Demographic Summary     Row Name 10/20/20 1013       General Information    Referral Source  admission list    Reason for Consult  discharge planning    Preferred Language  English     Used During This Interaction  no    General Information Comments  PCP- Henry Keller       Contact Information    Permission Granted to Share Info With          Functional Status     Row Name 10/20/20 1014       Functional Status    Usual Activity Tolerance  moderate    Current Activity Tolerance  fair       Functional Status, IADL    Medications  independent    Meal Preparation  assistive person    Housekeeping  assistive person    Laundry  assistive person    Shopping  assistive person       Employment/    Employment/ Comments  pt confirms he has Medicare and [employer], denies concerns or disruption in coverage. Pt has prescription drug coverage and denies issues obtaining or affording current  medications.        Psychosocial    No documentation.       Abuse/Neglect    No documentation.       Legal    No documentation.       Substance Abuse    No documentation.       Patient Forms    No documentation.           Michelle Colin RN

## 2020-10-20 NOTE — PLAN OF CARE
Problem: Adult Inpatient Plan of Care  Goal: Plan of Care Review  Recent Flowsheet Documentation  Taken 10/20/2020 1311 by Rebeca Garibay OT  Plan of Care Reviewed With:   patient   spouse  Outcome Summary: Interscalene infusing at 4, post pain level 7/10 left generalized shoulder. Educated pt and spouse on L shoulder precautions, ADL retaining to maintain, sling management and LUE HEP. Vitals stable seated level on RA. Recommend DC home with family assist and HHOT.

## 2020-10-20 NOTE — PROGRESS NOTES
Referring Provider: Tan Miranda MD     Orthopedic Shoulder Surgery Note - Tan Miranda MD    CC: left shoulder pain, replacement    Subjective:  The patient rested comfortably overnight with an expected amount of postoperative pain.  No events overnight. The patient denies any chest pain/shortness of breath/fever/chills or any other systemic symptoms.    He has been very pleased with his care during this hospitalization.  He has baseline and chronic left lower extremity swelling and Dr. Macedo did provide a courtesy visit this morning which we are appreciative of.  Mr. Lara is aware of the plan with regards to anticoagulation.  Very supportive wife, Ashley, at the bedside.    Medications/Allergies:    Current Facility-Administered Medications:   •  acetaminophen (TYLENOL) tablet 325 mg, 325 mg, Oral, Q8H PRN, Tan Miranda MD  •  atenolol (TENORMIN) tablet 50 mg, 50 mg, Oral, Daily, Rebeca Coronel APRN, 50 mg at 10/19/20 1616  •  diphenhydrAMINE (BENADRYL) capsule 25 mg, 25 mg, Oral, Q6H PRN **OR** diphenhydrAMINE (BENADRYL) injection 25 mg, 25 mg, Intravenous, Q6H PRN, Tan Miranda MD  •  enoxaparin (LOVENOX) syringe 40 mg, 40 mg, Subcutaneous, Daily, Tan Miranda MD  •  HYDROcodone-acetaminophen (NORCO)  MG per tablet 1 tablet, 1 tablet, Oral, Q4H PRN, Tan Miranda MD, 1 tablet at 10/20/20 0538  •  HYDROmorphone (DILAUDID) injection 0.5 mg, 0.5 mg, Intravenous, Q2H PRN **AND** naloxone (NARCAN) injection 0.1 mg, 0.1 mg, Intravenous, Q5 Min PRN, Tan Miranda MD  •  labetalol (NORMODYNE,TRANDATE) injection 10 mg, 10 mg, Intravenous, Q4H PRN, Homer, Rebeca, APRN  •  ondansetron (ZOFRAN) injection 4 mg, 4 mg, Intravenous, Q6H PRN, Homer Rebeca, APRN  •  oxybutynin XL (DITROPAN-XL) 24 hr tablet 5 mg, 5 mg, Oral, Nightly, Rebeca Coronel, APRN, 5 mg at 10/19/20 2020  •  phenol (CHLORASEPTIC) 1.4 % liquid 2 spray, 2 spray, Mouth/Throat, Q2H PRN,  Chey Carballo MD, 2 spray at 10/19/20 2141  •  ropivacaine (Naropin) 0.2% in NS infusion (ARROW Pump), 6 mL/hr, Peripheral Nerve, Continuous, Henry Jennings MD, Last Rate: 6 mL/hr at 10/19/20 1617, 6 mL/hr at 10/19/20 1617  •  sennosides-docusate (PERICOLACE) 8.6-50 MG per tablet 2 tablet, 2 tablet, Oral, BID PRN, Tan Miranda MD  •  sodium chloride 0.9 % bolus 500 mL, 500 mL, Intravenous, TID PRN, Rebeca Coronel APRN  •  sodium chloride 0.9 % infusion, 75 mL/hr, Intravenous, Continuous, Tan Miranda MD, Last Rate: 75 mL/hr at 10/19/20 1910, 75 mL/hr at 10/19/20 1910  •  tamsulosin (FLOMAX) 24 hr capsule 0.8 mg, 0.8 mg, Oral, Nightly, Rebeca Coronel APRN, 0.8 mg at 10/19/20 2020  Allergies   Allergen Reactions   • Pravastatin Other (See Comments)     Nocturnal leg cramps   • Atorvastatin      Fatigue   • Qsymia [Phentermine-Topiramate] Mental Status Change   • Topiramate Myalgia       Objective:  Vital Signs   Temp:  [97.4 °F (36.3 °C)-98.3 °F (36.8 °C)] 98.3 °F (36.8 °C)  Heart Rate:  [54-83] 63  Resp:  [16-20] 18  BP: (111-157)/(67-93) 135/74    Results Review:   I reviewed the patient's new clinical results.  Results from last 7 days   Lab Units 10/20/20  0506   WBC 10*3/mm3 15.02*   HEMOGLOBIN g/dL 13.5   HEMATOCRIT % 43.5   PLATELETS 10*3/mm3 138*     Results from last 7 days   Lab Units 10/20/20  0812   SODIUM mmol/L 139   POTASSIUM mmol/L 3.9   CHLORIDE mmol/L 111*   CO2 mmol/L 22.0   BUN mg/dL 14   CREATININE mg/dL 0.86   GLUCOSE mg/dL 112*   CALCIUM mg/dL 6.9*         Results from last 7 days   Lab Units 10/20/20  0812   SODIUM mmol/L 139   POTASSIUM mmol/L 3.9   CHLORIDE mmol/L 111*   CO2 mmol/L 22.0   BUN mg/dL 14   CREATININE mg/dL 0.86   CALCIUM mg/dL 6.9*   GLUCOSE mg/dL 112*       Xr Shoulder 1 View Left    Result Date: 10/19/2020  EXAMINATION: XR SHOULDER 1 VW, LEFT-10/19/2020:  INDICATION: Postop left reverse; M19.012-Primary osteoarthritis, left shoulder;  M24.512-Contracture, left shoulder; M75.22-Bicipital tendinitis, left shoulder.  COMPARISON: NONE.  FINDINGS: Two views of the left shoulder reveal the patient to be status post total left shoulder arthroplasty. No hardware complication or malalignment identified of the prosthesis. Drainage catheter is seen in the soft tissues. Small left pleural effusion. No hardware complication or malalignment.      Status post total left shoulder arthroplasty with no hardware complication or malalignment identified of the prosthesis.  D:  10/19/2020 E:  10/19/2020             Physical Exam:  General: comfortable  Vascular: 2+ radial puls, symmetric; digits are pink and well perfused  Operative Shoulder Exam: sling is in place  Neurologic: sensation to light touch is intact distally, elbow flexion/elbow extension/wrist flexion/wrist extension/hand intrinsics intact, Intact sensation over the deltoid  Dermatologic: surgical dressing is well appearing - clean, dry, and intact  Left lower extremity baseline swelling is stable    Assessment/Plan:  The patient is comfortable and does desire to be discharged home today.  We will make arrangements for discharge.  I appreciate the medical management of the internal medicine team.  The patient is okay for discharge once cleared by the medical team.      POD #: 1  Pain control - well controlled  Drain -removed this a.m. per nursing team  Dressing - clean and can be removed by patient on POD#3 at home.  Okay for RN to replace prior to discharge if dressing is saturated  Sling - in place  Activity - no weight bearing on affected side  PT/OT - sling teaching, non-weight bearing, elbow/wrist/hand exercises only, pendulums for showering on POD #3 at home and for dressing  DVT Prophylaxis -Lovenox and SCDs as inpatient recommended --will resume Xarelto on 10/22  Antibiotics - complete postoperative antibiotic course  Diet - continue diet  Anesthesia - RN will arrange with anesthesia team for OnQ  prior to discharge    Social work-patient does desire home health and physical therapy at home.        I discussed the patient's findings and my recommendations with patient      Tan Miranda MD, FAAOS  Orthopedic Surgeon  Fellowship-trained Shoulder and Elbow Surgeon  [facility]  Orthopedics and Sports Medicine  [address] [place] [address]. [address]  [place] [address]      Tan Miranda MD  10/20/20  08:53 EDT

## 2020-10-20 NOTE — THERAPY EVALUATION
Patient Name: Alin Lara  : 1935    MRN: [id number]                              Today's Date: 10/20/2020       Admit Date: 10/19/2020    Visit Dx:     ICD-10-CM ICD-9-CM   1. S/P reverse total shoulder arthroplasty, left  Z96.612 V43.61   2. Primary localized osteoarthrosis of left shoulder region  M19.012 715.11   3. Contracture of joint of left shoulder region  M24.512 718.41   4. Biceps tendinitis of left upper extremity  M75.22 726.12   5. Impaired mobility and activities of daily living  Z74.09 V49.89    Z78.9    6. Paroxysmal atrial fibrillation (CMS/HCC)  I48.0 427.31   7. Essential hypertension  I10 401.9     Patient Active Problem List   Diagnosis   • Paroxysmal atrial fibrillation (CMS/HCC)   • Basal cell carcinoma of skin   • Impotence of organic origin   • Gastroesophageal reflux disease   • Hyperlipidemia   • Hypertension   • Persistent insomnia   • Knee pain   • Osteoarthritis of lumbar spine   • Adiposity   • Overactive bladder   • Arthralgia of hip   • Impaired glucose tolerance   • Prostatism   • History of pulmonary embolism (CMS/HCC)   • Inflammation of sacroiliac joint (CMS/HCC)   • Generalized osteoarthritis   • Preventative health care   • Vertigo   • TIA (transient ischemic attack)   • Lymphocytic colitis   • Esophageal stricture   • Left shoulder pain   • Primary localized osteoarthrosis of left shoulder region   • Contracture of joint of left shoulder region   • Biceps tendinitis of left upper extremity   • S/P reverse total shoulder arthroplasty, left     Past Medical History:   Diagnosis Date   • Basal cell carcinoma    • Blood loss     post op   • ED (erectile dysfunction)     Responsive to Viagra   • Failed total knee, right (CMS/HCC)    • Fasting hypoglycemia 2016    Hemoglobin A1c normal   • Generalized osteoarthritis    • GERD (gastroesophageal reflux disease)     History esophageal stricture   • History of blood clots    • HNP (herniated nucleus pulposus), lumbar ,  2014    L5 L6   • Hyperglycemia     not diabetic, pt states hes never has high blood sugar per pt    • Hyperlipidemia    • Hypertension    • Microscopic colitis 2017    Positive biopsy - colonoscopy   • OAB (overactive bladder) 2000    Persistent urgency   • Obesity Adulthood    2012 body weight 244 BMI 32   • Painful total knee replacement (CMS/HCC)    • Paroxysmal atrial fibrillation (CMS/HCC) 1991    one episode - cardioverted   • Prostatism 2014   • Pulmonary embolism (CMS/HCC) 1991    after injury and protracted immobilization   • Right knee DJD 2015    Partial knee arthroplasty   • Shoulder dislocation     Repeated episodes   • Sleep disturbances 1990    Frequently requires medication   • Transient cerebral ischemia 2010, 2016    Negative medical workup on both occasions   • Venous stasis    • Vertigo    • Wears glasses      Past Surgical History:   Procedure Laterality Date   • CARDIOVERSION  1991    Atrial fibrillation   • COLONOSCOPY      2018   • KNEE ARTHROPLASTY Right 2015    Partial    • KNEE ARTHROPLASTY Left    • KNEE ARTHROSCOPY Right 2015    Failed procedure   • LUMBAR DISC SURGERY  2014    Surgery ×2 Ruptured disc   • LUMBAR DISCECTOMY  1988    L5 L6    • TOTAL SHOULDER ARTHROPLASTY W/ DISTAL CLAVICLE EXCISION Left 10/19/2020    Procedure: TOTAL SHOULDER REVERSE ARTHROPLASTY LEFT;  Surgeon: Tan Miranda MD;  Location: [facility];  Service: Orthopedics;  Laterality: Left;     General Information     Row Name 10/20/20 1311          OT Time and Intention    Document Type  evaluation  -AR     Mode of Treatment  occupational therapy  -AR     Row Name 10/20/20 1311          General Information    Patient Profile Reviewed  yes  -AR     Prior Level of Function  independent:;all household mobility;community mobility;gait;transfer;min assist:;ADL's  -AR     Existing Precautions/Restrictions  fall;left;shoulder;non-weight bearing;other (see comments) interscalene, Donjoy Ultra II sling  -AR     Row  Name 10/20/20 1311          Living Environment    Lives With  spouse  -AR     Row Name 10/20/20 1311          Home Main Entrance    Number of Stairs, Main Entrance  none  -AR     Stair Railings, Main Entrance  none  -AR     Row Name 10/20/20 1311          Cognition    Orientation Status (Cognition)  oriented x 4  -AR     Row Name 10/20/20 1311          Safety Issues, Functional Mobility    Safety Issues Affecting Function (Mobility)  impulsivity;safety precautions follow-through/compliance;safety precaution awareness  -AR     Impairments Affecting Function (Mobility)  pain;range of motion (ROM);sensation/sensory awareness  -AR       User Key  (r) = Recorded By, (t) = Taken By, (c) = Cosigned By    Initials Name Provider Type    Rebeca Pratt, OT Occupational Therapist        Mobility/ADL's     Row Name 10/20/20 1311          Bed Mobility    Comment (Bed Mobility)  Edcuated pt and spouse on importance of maintaining NWB LUE AAT, reviewed safe sleeping position.  -AR     Row Name 10/20/20 1311          Activities of Daily Living    BADL Assessment/Intervention  bathing;upper body dressing;feeding  -AR     Row Name 10/20/20 1311          Mobility    Extremity Weight-bearing Status  left upper extremity  -AR     Left Upper Extremity (Weight-bearing Status)  non weight-bearing (NWB)  -AR     Row Name 10/20/20 1311          Bathing Assessment/Intervention    [place] Level (Bathing)  upper body  -AR     Comment (Bathing)  Edcuated pt and spouse on L shoulder precautions and L axilla care to maintain  -AR     Row Name 10/20/20 1311          Upper Body Dressing Assessment/Training    [place] Level (Upper Body Dressing)  doff;don;other (see comments) LUE sling  -AR     Position (Upper Body Dressing)  supported sitting  -AR     Comment (Upper Body Dressing)  Educated pt and spouse on L shoulder precautions, ADL retraining to maintain, sling management and care of interscalene nerve catheter during ADLs to  avoid dislodgement. Spouse able to secure/unsecure sstraps with min assist.  -AR     Row Name 10/20/20 1311          Self-Feeding Assessment/Training    [place] Level (Feeding)  liquids to mouth;supervision  -AR     Position (Self-Feeding)  supported sitting  -AR       User Key  (r) = Recorded By, (t) = Taken By, (c) = Cosigned By    Initials Name Provider Type    AR Rebeca Garibay OT Occupational Therapist        Obj/Interventions     Row Name 10/20/20 1311          Sensory Assessment (Somatosensory)    Sensory Assessment (Somatosensory)  left UE  -AR     Sensory Subjective Reports  numbness  -AR     Row Name 10/20/20 1311          Vision Assessment/Intervention    Visual Impairment/Limitations  WNL  -AR     Row Name 10/20/20 1311          Range of Motion Comprehensive    Comment, General Range of Motion  RUE WFL, L elbow/wrist/hand WFL  -AR     Row Name 10/20/20 1311          Strength Comprehensive (MMT)    Comment, General Manual Muscle Testing (MMT) Assessment  RUE WFL, LUE deferred  -AR     Row Name 10/20/20 1311          Elbow/Forearm (Therapeutic Exercise)    Elbow/Forearm (Therapeutic Exercise)  AAROM (active assistive range of motion)  -AR     Elbow/Forearm AAROM (Therapeutic Exercise)  left;flexion;extension;10 repetitions;sitting  -AR     Row Name 10/20/20 1311          Wrist (Therapeutic Exercise)    Wrist (Therapeutic Exercise)  AROM (active range of motion)  -AR     Wrist AROM (Therapeutic Exercise)  left;flexion;extension;10 repetitions  -AR     Row Name 10/20/20 1311          Hand (Therapeutic Exercise)    Hand (Therapeutic Exercise)  AROM (active range of motion)  -AR     Hand AROM/AAROM (Therapeutic Exercise)  left;finger flexion;finger extension;10 repetitions  -AR     Row Name 10/20/20 1311          Balance    Balance Assessment  sitting static balance;sitting dynamic balance  -AR     Static Sitting Balance  WNL  -AR     Dynamic Sitting Balance  WFL  -AR     Row Name 10/20/20 1311           Therapeutic Exercise    Therapeutic Exercise  elbow/forearm;wrist;hand  -AR       User Key  (r) = Recorded By, (t) = Taken By, (c) = Cosigned By    Initials Name Provider Type    Rebeca Pratt OT Occupational Therapist        Goals/Plan     Row Name 10/20/20 1311          Transfer Goal 1 (OT)    Activity/Assistive Device (Transfer Goal 1, OT)  sit-to-stand/stand-to-sit  -AR     [place] Level/Cues Needed (Transfer Goal 1, OT)  contact guard assist;verbal cues required  -AR     Time Frame (Transfer Goal 1, OT)  short term goal (STG);3 days  -AR     Progress/Outcome (Transfer Goal 1, OT)  goal ongoing  -AR     Row Name 10/20/20 1311          Dressing Goal 1 (OT)    Activity/Device (Dressing Goal 1, OT)  other (see comments) Pt and spouse will don/doff LUE sling with supervision  -AR     Time Frame (Dressing Goal 1, OT)  short term goal (STG);3 days  -AR     Progress/Outcome (Dressing Goal 1, OT)  goal ongoing  -AR     Row Name 10/20/20 1311          ROM Goal 1 (OT)    ROM Goal 1 (OT)  Pt will complete LUE HEP within physician parameters with supervision  -AR     Time Frame (ROM Goal 1, OT)  short term goal (STG);3 days  -AR     Progress/Outcome (ROM Goal 1, OT)  goal ongoing  -AR     Row Name 10/20/20 1311          Therapy Assessment/Plan (OT)    Planned Therapy Interventions (OT)  BADL retraining;orthotic fabrication/fitting/training;patient/caregiver education/training;ROM/therapeutic exercise;transfer/mobility retraining  -AR       User Key  (r) = Recorded By, (t) = Taken By, (c) = Cosigned By    Initials Name Provider Type    Rebeca Pratt OT Occupational Therapist        Clinical Impression     Row Name 10/20/20 1311          Pain Assessment    Additional Documentation  Pain Scale: Numbers Pre/Post-Treatment (Group)  -AR     Row Name 10/20/20 1311          Pain Scale: Numbers Pre/Post-Treatment    Pretreatment Pain Rating  4/10  -AR     Posttreatment Pain Rating  7/10  -AR     Pain  Location - Side  Left  -AR     Pain Location - Orientation  generalized  -AR     Pain Location  shoulder  -AR     Pre/Posttreatment Pain Comment  RN notified of pain level and medicated pt at end of session  -AR     Pain Intervention(s)  Cold applied;Medication (See MAR);Repositioned  -AR     Row Name 10/20/20 1311          Plan of Care Review    Plan of Care Reviewed With  patient;spouse  -AR     Outcome Summary  Interscalene infusing at 4, post pain level 7/10 left genrealized shoulder. Educated pt and spouse on L shoulder precautions, ADL retaining to maintain, sling management and LUE HEP. Vitals stable seated level on RA. Recommend DC home with family assist and HHOT.  -AR     Row Name 10/20/20 1311          Therapy Assessment/Plan (OT)    Rehab Potential (OT)  good, to achieve stated therapy goals  -AR     Criteria for Skilled Therapeutic Interventions Met (OT)  yes  -AR     Therapy Frequency (OT)  daily  -AR     Row Name 10/20/20 1311          Therapy Plan Review/Discharge Plan (OT)    Anticipated Discharge Disposition (OT)  home with assist;home with home health  -AR     Row Name 10/20/20 1311          Vital Signs    Pre Systolic BP Rehab  107  -AR     Pre Treatment Diastolic BP  55  -AR     Post Systolic BP Rehab  144  -AR     Post Treatment Diastolic BP  77  -AR     Pretreatment Heart Rate (beats/min)  65  -AR     Posttreatment Heart Rate (beats/min)  63  -AR     Pre SpO2 (%)  92  -AR     O2 Delivery Pre Treatment  room air  -AR     Intra SpO2 (%)  92  -AR     O2 Delivery Intra Treatment  room air  -AR     Post SpO2 (%)  92  -AR     O2 Delivery Post Treatment  room air  -AR     Pre Patient Position  Sitting  -AR     Intra Patient Position  Sitting  -AR     Post Patient Position  Sitting  -AR     Row Name 10/20/20 1311          Positioning and Restraints    Pre-Treatment Position  sitting in chair/recliner  -AR     Post Treatment Position  chair  -AR     In Chair  reclined;exit alarm on;call light within  reach;encouraged to call for assist;with nsg;with family/caregiver;with brace  -AR       User Key  (r) = Recorded By, (t) = Taken By, (c) = Cosigned By    Initials Name Provider Type    Rebeca Pratt OT Occupational Therapist        Outcome Measures     Row Name 10/20/20 1311          How much help from another is currently needed...    Putting on and taking off regular lower body clothing?  2  -AR     Bathing (including washing, rinsing, and drying)  2  -AR     Toileting (which includes using toilet bed pan or urinal)  2  -AR     Putting on and taking off regular upper body clothing  2  -AR     Taking care of personal grooming (such as brushing teeth)  3  -AR     Eating meals  3  -AR     AM-PAC 6 Clicks Score (OT)  14  -AR     Row Name 10/20/20 1311          Functional Assessment    Outcome Measure Options  AM-PAC 6 Clicks Daily Activity (OT)  -AR       User Key  (r) = Recorded By, (t) = Taken By, (c) = Cosigned By    Initials Name Provider Type    Rebeca Pratt OT Occupational Therapist        Occupational Therapy Education                 Title: PT OT SLP Therapies (In Progress)     Topic: Occupational Therapy (Done)     Point: ADL training (Done)     Description:   Instruct learner(s) on proper safety adaptation and remediation techniques during self care or transfers.   Instruct in proper use of assistive devices.              Learning Progress Summary           Patient Eager, E,TB,D,H, VU,NR by AR at 10/20/2020 1311   Family Shabbir, E,TB,D,H, VU,NR by AR at 10/20/2020 1311                   Point: Home exercise program (Done)     Description:   Instruct learner(s) on appropriate technique for monitoring, assisting and/or progressing therapeutic exercises/activities.              Learning Progress Summary           Patient Eager, E,TB,D,H, VU,NR by AR at 10/20/2020 1311   Family Eager, E,TB,D,H, VU,NR by AR at 10/20/2020 1311                   Point: Precautions (Done)     Description:    Instruct learner(s) on prescribed precautions during self-care and functional transfers.              Learning Progress Summary           Patient Eager, E,TB,D,H, VU,NR by AR at 10/20/2020 1311   Family Eager, E,TB,D,H, VU,NR by AR at 10/20/2020 1311                   Point: Body mechanics (Done)     Description:   Instruct learner(s) on proper positioning and spine alignment during self-care, functional mobility activities and/or exercises.              Learning Progress Summary           Patient Eager, E,TB,D,H, VU,NR by AR at 10/20/2020 1311   Family Eager, E,TB,D,H, VU,NR by AR at 10/20/2020 1311                               User Key     Initials Effective Dates Name Provider Type [facility] 06/22/15 -  Rebeca Garibay OT Occupational Therapist OT              OT Recommendation and Plan  Planned Therapy Interventions (OT): BADL retraining, orthotic fabrication/fitting/training, patient/caregiver education/training, ROM/therapeutic exercise, transfer/mobility retraining  Therapy Frequency (OT): daily  Plan of Care Review  Plan of Care Reviewed With: patient, spouse  Outcome Summary: Interscalene infusing at 4, post pain level 7/10 left genrealized shoulder. Educated pt and spouse on L shoulder precautions, ADL retaining to maintain, sling management and LUE HEP. Vitals stable seated level on RA. Recommend DC home with family assist and HHOT.     Time Calculation:   Time Calculation- OT     Row Name 10/20/20 1311             Time Calculation- OT    OT Start Time  1311  -AR      OT Received On  10/20/20  -AR      OT Goal Re-Cert Due Date  10/30/20  -AR         Timed Charges    14167 - OT Therapeutic Exercise Minutes  11  -AR      94620 - OT Self Care/Mgmt Minutes  28  -AR        User Key  (r) = Recorded By, (t) = Taken By, (c) = Cosigned By    Initials Name Provider Type    Rebeca Pratt OT Occupational Therapist        Therapy Charges for Today     Code Description Service Date Service  Provider Modifiers Qty    89894961712 HC OT THER PROC EA 15 MIN 10/20/2020 Rebeca Garibay OT GO 1    51745046097 HC OT SELF CARE/MGMT/TRAIN EA 15 MIN 10/20/2020 Rebeca Garibay OT GO 2    64866328605 HC OT EVAL LOW COMPLEXITY 4 10/20/2020 Rebeca Garibay OT GO 1    57684713442 HC OT THER SUPP EA 15 MIN 10/20/2020 Rebeca Garibay OT GO 1               Rebeca Garibay OT  10/20/2020

## 2020-10-20 NOTE — PLAN OF CARE
Goal Outcome Evaluation:  Plan of Care Reviewed With: patient  Progress: improving  Outcome Summary: VSS stable on RA. Resting comfortably throughout shift. Complaints of pain relieved with PO PRN meds. Tolerating IS well. Ambulating and voiding. Anticipate DC today. Will contninue to monitor.

## 2020-10-20 NOTE — PROGRESS NOTES
[facility]    Acute pain service Inpatient Progress Note    Patient Name: Alin Lara  :  1935  MRN:  [id number]        Acute Pain  Service Inpatient Progress Note:    Analgesia:Fair  Pain Score:4/10  LOC: alert and awake  Resp Status: supplemental oxygen (3 L NC)  Cardiac: VS stable  Side Effects:None  Catheter Site:clean, dressing intact and dry  Cath type: peripheral nerve cath with ON Q  Infusion rate: 4ml/hr (rate reduced to 4 due to SOB)  Catheter Plan:Catheter to remain Insitu and Continue catheter infusion rate unchanged  Comments: ---------------------------------------------------------------------------------  Physical Therapy Manual Muscle Testing Results:   ]    Physical Therapy - Plan of Care Review - Outcome Summary:  Outcome Summary: PT eval performed: Pt presenting s/p day 1 L shoulder arthroplasty with generalized weakness and SOA.  Education on L shoulder precautions, min-A bed mobility, CGA STS,Pt able to ambulate 25', min-A, RUE support and close chair follow.  Pt initially having issues with orthostatic BP upon standing (98/51mmHg) and had to wait for BP to improve.  Pt with resting SpO2 at 93% with tendency to drop to upper 80's with talking and mobility. Pt educated on importance of deferring ambulation until vitals improved but patient impulsive and began walking, was able to utilize close chair follow and min-A for 25' before convincing pt to sit.  O2 sats returned to 92% after 1 min sitting break.  Pt very eager to return home today but concern for safety until mobility is safer and vitals more stable.  Recommend home with assist and HH when medically stable and improved mobility. (10/20/20 1005)]    Occupational Therapy - Plan of Care Review - Outcome Summary:  Outcome Summary: Interscalene infusing at 4, post pain level 7/10 left genrealized shoulder. Educated pt and spouse on L shoulder precautions, ADL retaining to maintain, sling management and LUE HEP. Vitals stable  seated level on RA. Recommend DC home with family assist and HHOT. (10/20/20 1289)]  ----------------------------------------------------------------------------------

## 2020-10-20 NOTE — THERAPY EVALUATION
Patient Name: Alin Lara  : 1935    MRN: [id number]                              Today's Date: 10/20/2020       Admit Date: 10/19/2020    Visit Dx:     ICD-10-CM ICD-9-CM   1. S/P reverse total shoulder arthroplasty, left  Z96.612 V43.61   2. Primary localized osteoarthrosis of left shoulder region  M19.012 715.11   3. Contracture of joint of left shoulder region  M24.512 718.41   4. Biceps tendinitis of left upper extremity  M75.22 726.12     Patient Active Problem List   Diagnosis   • Paroxysmal atrial fibrillation (CMS/HCC)   • Basal cell carcinoma of skin   • Impotence of organic origin   • Gastroesophageal reflux disease   • Hyperlipidemia   • Hypertension   • Persistent insomnia   • Knee pain   • Osteoarthritis of lumbar spine   • Adiposity   • Overactive bladder   • Arthralgia of hip   • Impaired glucose tolerance   • Prostatism   • History of pulmonary embolism (CMS/HCC)   • Inflammation of sacroiliac joint (CMS/HCC)   • Generalized osteoarthritis   • Preventative health care   • Vertigo   • TIA (transient ischemic attack)   • Lymphocytic colitis   • Esophageal stricture   • Left shoulder pain   • Primary localized osteoarthrosis of left shoulder region   • Contracture of joint of left shoulder region   • Biceps tendinitis of left upper extremity   • S/P reverse total shoulder arthroplasty, left     Past Medical History:   Diagnosis Date   • Basal cell carcinoma    • Blood loss     post op   • ED (erectile dysfunction)     Responsive to Viagra   • Failed total knee, right (CMS/HCC)    • Fasting hypoglycemia 2016    Hemoglobin A1c normal   • Generalized osteoarthritis    • GERD (gastroesophageal reflux disease)     History esophageal stricture   • History of blood clots    • HNP (herniated nucleus pulposus), lumbar ,     L5 L6   • Hyperglycemia     not diabetic, pt states hes never has high blood sugar per pt    • Hyperlipidemia    • Hypertension    • Microscopic colitis 2017    Positive  biopsy - colonoscopy   • OAB (overactive bladder) 2000    Persistent urgency   • Obesity Adulthood    2012 body weight 244 BMI 32   • Painful total knee replacement (CMS/HCC)    • Paroxysmal atrial fibrillation (CMS/HCC) 1991    one episode - cardioverted   • Prostatism 2014   • Pulmonary embolism (CMS/HCC) 1991    after injury and protracted immobilization   • Right knee DJD 2015    Partial knee arthroplasty   • Shoulder dislocation     Repeated episodes   • Sleep disturbances 1990    Frequently requires medication   • Transient cerebral ischemia 2010, 2016    Negative medical workup on both occasions   • Venous stasis    • Vertigo    • Wears glasses      Past Surgical History:   Procedure Laterality Date   • CARDIOVERSION  1991    Atrial fibrillation   • COLONOSCOPY      2018   • KNEE ARTHROPLASTY Right 2015    Partial    • KNEE ARTHROPLASTY Left    • KNEE ARTHROSCOPY Right 2015    Failed procedure   • LUMBAR DISC SURGERY  2014    Surgery ×2 Ruptured disc   • LUMBAR DISCECTOMY  1988    L5 L6    • TOTAL SHOULDER ARTHROPLASTY W/ DISTAL CLAVICLE EXCISION Left 10/19/2020    Procedure: TOTAL SHOULDER REVERSE ARTHROPLASTY LEFT;  Surgeon: Tan Miranda MD;  Location: [facility];  Service: Orthopedics;  Laterality: Left;     General Information     Row Name 10/20/20 0907          Physical Therapy Time and Intention    Document Type  evaluation  -KG     Mode of Treatment  physical therapy  -KG     Row Name 10/20/20 0943          General Information    Patient Profile Reviewed  yes  -KG     Prior Level of Function  independent:;all household mobility;min assist:;ADL's;dressing;max assist:;home management;cooking;cleaning  -KG     Existing Precautions/Restrictions  fall interscalene nerve catheter, NWB LUE  -KG     Barriers to Rehab  previous functional deficit;physical barrier Left knee pain  -KG     Row Name 10/20/20 0920          Living Environment    Lives With  spouse  -KG     Row Name 10/20/20 0910          Home  Main Entrance    Number of Stairs, Main Entrance  none  -KG     Row Name 10/20/20 0943          Stairs Within Home, Primary    Number of Stairs, Within Home, Primary  none  -KG     Row Name 10/20/20 0943          Cognition    Orientation Status (Cognition)  oriented x 4  -KG     Row Name 10/20/20 0903          Safety Issues, Functional Mobility    Safety Issues Affecting Function (Mobility)  at risk behavior observed;awareness of need for assistance;impulsivity;safety precautions follow-through/compliance  -KG     Impairments Affecting Function (Mobility)  balance;pain;strength;shortness of breath  -KG       User Key  (r) = Recorded By, (t) = Taken By, (c) = Cosigned By    Initials Name Provider Type    RONIT Asia Mccarthy Physical Therapist        Mobility     Row Name 10/20/20 0950          Bed Mobility    Bed Mobility  supine-sit  -KG     Supine-Sit [place] (Bed Mobility)  minimum assist (75% patient effort);1 person assist  -KG     Assistive Device (Bed Mobility)  draw sheet;head of bed elevated  -KG     Comment (Bed Mobility)  cues for advancing to EOB, shoulder precautions  -KG     Row Name 10/20/20 0950          Transfers    Comment (Transfers)  cues to push up with RUE  -KG     Row Name 10/20/20 0950          Sit-Stand Transfer    Sit-Stand [place] (Transfers)  contact guard  -KG     Assistive Device (Sit-Stand Transfers)  -- gait belt  -KG     Row Name 10/20/20 0950          Gait/Stairs (Locomotion)    [place] Level (Gait)  minimum assist (75% patient effort);2 person assist  -KG     Assistive Device (Gait)  -- RUE support  -KG     Deviations/Abnormal Patterns (Gait)  gait speed decreased  -KG     Bilateral Gait Deviations  forward flexed posture;heel strike decreased  -KG     Left Sided Gait Deviations  weight shift ability decreased  -KG     Comment (Gait/Stairs)  Pt able to ambulate 25', min-A, RUE support and close chair follow.  Pt initially having issues with orthostatic BP upon  standing (98/51mmHg) and had to wait for BP to improve.  Pt with resting SpO2 at 93% with tendency to drop to upper 80's with talking and mobility. Pt educated on importance of waiting on ambulation until vitals improved but patient impulsive and began walking, was able to utilize close chair follow and min-A for 25' before convincing pt to sit.  O2 sats returned to 92% after 1 min sitting break.  -KG     Row Name 10/20/20 0950          Mobility    Extremity Weight-bearing Status  left upper extremity  -KG     Left Upper Extremity (Weight-bearing Status)  non weight-bearing (NWB)  -KG       User Key  (r) = Recorded By, (t) = Taken By, (c) = Cosigned By    Initials Name Provider Type    Asia Landers Physical Therapist        Obj/Interventions     Row Name 10/20/20 1003          Strength Comprehensive (MMT)    General Manual Muscle Testing (MMT) Assessment  lower extremity strength deficits identified  -KG     Comment, General Manual Muscle Testing (MMT) Assessment  4-/5 LLE, 4/5 RLE  -KG     Row Name 10/20/20 1003          Balance    Balance Assessment  sitting static balance;sitting dynamic balance;standing dynamic balance  -KG     Static Sitting Balance  WNL  -KG     Dynamic Sitting Balance  mild impairment  -KG     Dynamic Standing Balance  mild impairment  -KG       User Key  (r) = Recorded By, (t) = Taken By, (c) = Cosigned By    Initials Name Provider Type    Asia Landers Physical Therapist        Goals/Plan     Row Name 10/20/20 1010          Bed Mobility Goal 1 (PT)    Activity/Assistive Device (Bed Mobility Goal 1, PT)  sit to supine/supine to sit  -KG     [place] Level/Cues Needed (Bed Mobility Goal 1, PT)  contact guard assist  -KG     Time Frame (Bed Mobility Goal 1, PT)  long term goal (LTG);10 days  -KG     Progress/Outcomes (Bed Mobility Goal 1, PT)  continuing progress toward goal  -KG     Row Name 10/20/20 1010          Transfer Goal 1 (PT)    Activity/Assistive Device (Transfer  Goal 1, PT)  sit-to-stand/stand-to-sit;bed-to-chair/chair-to-bed;toilet  -KG     [place] Level/Cues Needed (Transfer Goal 1, PT)  standby assist  -KG     Time Frame (Transfer Goal 1, PT)  long term goal (LTG);10 days  -KG     Progress/Outcome (Transfer Goal 1, PT)  continuing progress toward goal  -KG     Row Name 10/20/20 1010          Gait Training Goal 1 (PT)    Activity/Assistive Device (Gait Training Goal 1, PT)  gait (walking locomotion);cane, straight  -KG     [place] Level (Gait Training Goal 1, PT)  contact guard assist  -KG     Distance (Gait Training Goal 1, PT)  150  -KG     Time Frame (Gait Training Goal 1, PT)  long term goal (LTG);10 days  -KG     Progress/Outcome (Gait Training Goal 1, PT)  continuing progress toward goal  -KG       User Key  (r) = Recorded By, (t) = Taken By, (c) = Cosigned By    Initials Name Provider Type    KG Asia Mccarthy Physical Therapist        Clinical Impression     Row Name 10/20/20 1005          Pain    Additional Documentation  Pain Scale: Numbers Pre/Post-Treatment (Group)  -KG     Row Name 10/20/20 1005          Pain Scale: Numbers Pre/Post-Treatment    Pretreatment Pain Rating  3/10  -KG     Posttreatment Pain Rating  4/10  -KG     Pain Location - Side  Left  -KG     Pain Location - Orientation  upper  -KG     Pain Location  extremity  -KG     Pain Intervention(s)  Repositioned;Ambulation/increased activity  -KG     Row Name 10/20/20 1005          Plan of Care Review    Plan of Care Reviewed With  patient  -KG     Progress  no change  -KG     Outcome Summary  PT eval performed: Pt presenting s/p day 1 L shoulder arthroplasty with generalized weakness and SOA.  Education on L shoulder precautions, min-A bed mobility, CGA STS,Pt able to ambulate 25', min-A, RUE support and close chair follow.  Pt initially having issues with orthostatic BP upon standing (98/51mmHg) and had to wait for BP to improve.  Pt with resting SpO2 at 93% with tendency to drop to  upper 80's with talking and mobility. Pt educated on importance of deferring ambulation until vitals improved but patient impulsive and began walking, was able to utilize close chair follow and min-A for 25' before convincing pt to sit.  O2 sats returned to 92% after 1 min sitting break.  Pt very eager to return home today but concern for safety until mobility is safer and vitals more stable.  Recommend home with assist and HH when medically stable and improved mobility.  -KG     Row Name 10/20/20 1005          Therapy Assessment/Plan (PT)    Rehab Potential (PT)  fair, will monitor progress closely  -KG     Criteria for Skilled Interventions Met (PT)  yes;skilled treatment is necessary  -KG     Row Name 10/20/20 1005          Vital Signs    Pre Systolic BP Rehab  102  -KG     Pre Treatment Diastolic BP  56  -KG     Intra Systolic BP Rehab  91  -KG     Intra Treatment Diastolic BP  56  -KG     Post Systolic BP Rehab  103  -KG     Post Treatment Diastolic BP  64  -KG     Pre SpO2 (%)  94  -KG     O2 Delivery Pre Treatment  room air  -KG     Intra SpO2 (%)  88  -KG     O2 Delivery Intra Treatment  room air  -KG     Post SpO2 (%)  93  -KG     O2 Delivery Post Treatment  room air  -KG     Row Name 10/20/20 1005          Positioning and Restraints    Pre-Treatment Position  in bed  -KG     Post Treatment Position  chair  -KG     In Chair  notified nsg;call light within reach;encouraged to call for assist;exit alarm on;legs elevated  -KG       User Key  (r) = Recorded By, (t) = Taken By, (c) = Cosigned By    Initials Name Provider Type    KG Asia Mccarthy Physical Therapist        Outcome Measures     Row Name 10/20/20 1012          How much help from another person do you currently need...    Turning from your back to your side while in flat bed without using bedrails?  3  -KG     Moving from lying on back to sitting on the side of a flat bed without bedrails?  3  -KG     Moving to and from a bed to a chair  (including a wheelchair)?  2  -KG     Standing up from a chair using your arms (e.g., wheelchair, bedside chair)?  3  -KG     Climbing 3-5 steps with a railing?  1  -KG     To walk in hospital room?  2  -KG     AM-PAC 6 Clicks Score (PT)  14  -KG     Row Name 10/20/20 1012          Functional Assessment    Outcome Measure Options  AM-PAC 6 Clicks Basic Mobility (PT)  -KG       User Key  (r) = Recorded By, (t) = Taken By, (c) = Cosigned By    Initials Name Provider Type    KG Asia Mccarthy Physical Therapist        Physical Therapy Education                 Title: PT OT SLP Therapies (In Progress)     Topic: Physical Therapy (In Progress)     Point: Mobility training (In Progress)     Learning Progress Summary           Patient Acceptance, E,TB, NR by KG at 10/20/2020 1012                   Point: Precautions (In Progress)     Learning Progress Summary           Patient Acceptance, E,TB, NR by KG at 10/20/2020 1012                               User Key     Initials Effective Dates Name Provider Type Discipline    KG 08/28/19 -  Asia Mccarthy Physical Therapist PT              PT Recommendation and Plan     Plan of Care Reviewed With: patient  Progress: no change  Outcome Summary: PT eval performed: Pt presenting s/p day 1 L shoulder arthroplasty with generalized weakness and SOA.  Education on L shoulder precautions, min-A bed mobility, CGA STS,Pt able to ambulate 25', min-A, RUE support and close chair follow.  Pt initially having issues with orthostatic BP upon standing (98/51mmHg) and had to wait for BP to improve.  Pt with resting SpO2 at 93% with tendency to drop to upper 80's with talking and mobility. Pt educated on importance of deferring ambulation until vitals improved but patient impulsive and began walking, was able to utilize close chair follow and min-A for 25' before convincing pt to sit.  O2 sats returned to 92% after 1 min sitting break.  Pt very eager to return home today but concern for  safety until mobility is safer and vitals more stable.  Recommend home with assist and HH when medically stable and improved mobility.     Time Calculation:   PT Charges     Row Name 10/20/20 1015             Time Calculation    Start Time  0830  -KG      PT Received On  10/20/20  -KG      PT Goal Re-Cert Due Date  10/30/20  -KG         Time Calculation- PT    Total Timed Code Minutes- PT  20 minute(s)  -KG         Timed Charges    42227 - PT Therapeutic Activity Minutes  20  -KG        User Key  (r) = Recorded By, (t) = Taken By, (c) = Cosigned By    Initials Name Provider Type    RONIT Asia Mccarthy Physical Therapist        Therapy Charges for Today     Code Description Service Date Service Provider Modifiers Qty    47732148753 HC PT THERAPEUTIC ACT EA 15 MIN 10/20/2020 Asia Mccarthy GP 1    56416560873 HC PT EVAL MOD COMPLEXITY 4 10/20/2020 Asia Mccarthy GP 1          PT G-Codes  Outcome Measure Options: AM-PAC 6 Clicks Basic Mobility (PT)  AM-PAC 6 Clicks Score (PT): 14    Asia Mccarthy  10/20/2020

## 2020-10-20 NOTE — PROGRESS NOTES
"CHIEF COMPLAINT: Left leg swelling    HPI  Mr. Lara is an 84-year-old male I have previously seen in clinic.  He is status post left total knee arthroplasty over a year ago by Dr. Jacob Bermudez at the [facility].  He has been dissatisfied with his outcome of his knee replacement as he does have some chronic swelling in the left lower extremity.  He was diagnosed with a chronic DVT and has subsequently been initiated with Xarelto therapy.  Despite this, he continues to have left lower extremity swelling and difficulty with ambulation due to the pain primarily from the knee distally.  He continues to complain of swelling despite blood clot being treated with Xarelto.  He is currently in the hospital after having undergone reverse shoulder arthroplasty on the left side.  He requested to see me regarding his ongoing leg pain and swelling.  An extensive work-up has been performed including laboratory evaluation.  Sed rate was normal at 18 with a CRP slightly elevated 0.57.  His radiographs are benign appearing with excellent alignment and positioning of the components.  He is primarily concerned with the persistent swelling despite conservative course of treatment with Xarelto.    OBJECTIVE  Temp (24hrs), Av.9 °F (36.6 °C), Min:97.3 °F (36.3 °C), Max:98.3 °F (36.8 °C)    Blood pressure 120/71, pulse 70, temperature 97.7 °F (36.5 °C), temperature source Oral, resp. rate 18, height 186.7 cm (73.5\"), weight 112 kg (248 lb), SpO2 (!) 89 %.    Lab Results (last 24 hours)     Procedure Component Value Units Date/Time    CBC (No Diff) [[id number]]  (Abnormal) Collected: 10/20/20 0506    Specimen: Blood Updated: 10/20/20 0634     WBC 15.02 10*3/mm3      RBC 4.17 10*6/mm3      Hemoglobin 13.5 g/dL      Hematocrit 43.5 %      .3 fL      MCH 32.4 pg      MCHC 31.0 g/dL      RDW 13.2 %      RDW-SD 50.6 fl      MPV 10.9 fL      Platelets 138 10*3/mm3     Basic Metabolic Panel [[id number]] Updated: 10/20/20 0631    " Specimen: Blood             PHYSICAL EXAM  Left lower extremity:   ALIGNMENT: neutral  ----------  RANGE OF MOTION:  Decreased (5 - 120 degrees) with no extensor lag  LIGAMENTOUS STABILITY:   stable to varus and valgus stress at terminal extension and 30 degrees without any evidence of laxity  ----------  STRENGTH:  KNEE FLEXION 5/5  KNEE EXTENSION  5/5  ANKLE DORSIFLEXION  5/5  ANKLE PLANTARFLEXION  5/5  ----------  PAIN WITH PALPATION: Global soreness around the knee with associated pain and tenderness to palpation about the gastroc anterior compartment of the leg  KNEE EFFUSION:  Perhaps trace effusion versus subcutaneous edema  PAIN WITH KNEE ROM: yes  PATELLAR CREPITUS:  no  ----------  SENSATION TO LIGHT TOUCH:  DEEP PERONEAL/SUPERFICIAL PERONEAL/SURAL/SAPHENOUS/TIBIAL:    intact  ----------  EDEMA:   Lymphedema from ankle extending to knee proximally  ERYTHEMA:    no  WOUNDS/INCISIONS:   yes, well healed surgical incision without evidence of erythema or drainage         S/P reverse total shoulder arthroplasty, left    Paroxysmal atrial fibrillation (CMS/HCC)    Hyperlipidemia    Hypertension    History of pulmonary embolism (CMS/HCC)    Primary localized osteoarthrosis of left shoulder region    Contracture of joint of left shoulder region    Biceps tendinitis of left upper extremity  Chronic left lower extremity swelling  Status post left total knee arthroplasty    PLAN / DISPOSITION:  Chronic left lower extremity swelling status post left total knee arthroplasty    I had extensive discussion with the patient regarding his ongoing pain in his leg.  I explained that his laboratory evaluation as well as x-rays demonstrate no discernible etiology for his ongoing pain as it pertains to his knee replacement.  He does have significant persistent lymphedema involving the left lower extremity that is likely due venous engorgement and lymphedema from his chronic DVT.  I explained that unfortunately, this is not an  orthopedic problem and is more likely a vascular problem due to venous insufficiency/valve damage as a result of the chronic DVT.  Patient wishes to seek further evaluation and treatment for his ongoing leg pain.  I recommend referral to vascular surgery for further evaluation and possible discussion of treatment options regarding his lymphedema in the left lower extremity.  I discussed with him that revision knee arthroplasty is unlikely to change the swelling in his leg and may actually worsen the situation.  I recommend he see Dr. Edvin Bradshaw regarding the lymphedema.  I will see him back in orthopedic clinic after that evaluation to discuss further treatment options and/or recommendations based on vascular surgery treatment plan.    Zaheer Macedo MD  10/20/20  07:32 EDT

## 2020-10-21 ENCOUNTER — TELEPHONE (OUTPATIENT)
Dept: ORTHOPEDIC SURGERY | Facility: CLINIC | Age: 85
End: 2020-10-21

## 2020-10-21 ENCOUNTER — READMISSION MANAGEMENT (OUTPATIENT)
Dept: CALL CENTER | Facility: HOSPITAL | Age: 85
End: 2020-10-21

## 2020-10-21 VITALS
WEIGHT: 248 LBS | HEIGHT: 74 IN | SYSTOLIC BLOOD PRESSURE: 131 MMHG | HEART RATE: 60 BPM | TEMPERATURE: 98 F | DIASTOLIC BLOOD PRESSURE: 69 MMHG | OXYGEN SATURATION: 94 % | BODY MASS INDEX: 31.83 KG/M2 | RESPIRATION RATE: 18 BRPM

## 2020-10-21 PROBLEM — D72.829 LEUKOCYTOSIS: Status: ACTIVE | Noted: 2020-10-21

## 2020-10-21 PROBLEM — G89.18 ACUTE POSTOPERATIVE PAIN: Status: ACTIVE | Noted: 2020-10-21

## 2020-10-21 LAB
ANION GAP SERPL CALCULATED.3IONS-SCNC: 8 MMOL/L (ref 5–15)
BUN SERPL-MCNC: 12 MG/DL (ref 8–23)
BUN/CREAT SERPL: 12.4 (ref 7–25)
CA-I SERPL ISE-MCNC: 1.21 MMOL/L (ref 1.12–1.32)
CALCIUM SPEC-SCNC: 8.4 MG/DL (ref 8.6–10.5)
CHLORIDE SERPL-SCNC: 105 MMOL/L (ref 98–107)
CO2 SERPL-SCNC: 22 MMOL/L (ref 22–29)
CREAT SERPL-MCNC: 0.97 MG/DL (ref 0.76–1.27)
GFR SERPL CREATININE-BSD FRML MDRD: 74 ML/MIN/1.73
GLUCOSE SERPL-MCNC: 117 MG/DL (ref 65–99)
POTASSIUM SERPL-SCNC: 4.7 MMOL/L (ref 3.5–5.2)
SODIUM SERPL-SCNC: 135 MMOL/L (ref 136–145)

## 2020-10-21 PROCEDURE — 97530 THERAPEUTIC ACTIVITIES: CPT

## 2020-10-21 PROCEDURE — 82330 ASSAY OF CALCIUM: CPT | Performed by: INTERNAL MEDICINE

## 2020-10-21 PROCEDURE — 80048 BASIC METABOLIC PNL TOTAL CA: CPT | Performed by: INTERNAL MEDICINE

## 2020-10-21 PROCEDURE — 97110 THERAPEUTIC EXERCISES: CPT | Performed by: OCCUPATIONAL THERAPIST

## 2020-10-21 PROCEDURE — 97116 GAIT TRAINING THERAPY: CPT

## 2020-10-21 PROCEDURE — 25010000002 ENOXAPARIN PER 10 MG: Performed by: ORTHOPAEDIC SURGERY

## 2020-10-21 PROCEDURE — 97535 SELF CARE MNGMENT TRAINING: CPT | Performed by: OCCUPATIONAL THERAPIST

## 2020-10-21 PROCEDURE — 99024 POSTOP FOLLOW-UP VISIT: CPT | Performed by: ORTHOPAEDIC SURGERY

## 2020-10-21 RX ORDER — DOCUSATE SODIUM 100 MG/1
100 CAPSULE, LIQUID FILLED ORAL 2 TIMES DAILY
Qty: 60 CAPSULE | Refills: 0 | Status: SHIPPED | OUTPATIENT
Start: 2020-10-21

## 2020-10-21 RX ORDER — BISACODYL 5 MG/1
10 TABLET, DELAYED RELEASE ORAL DAILY PRN
Status: DISCONTINUED | OUTPATIENT
Start: 2020-10-21 | End: 2020-10-21 | Stop reason: HOSPADM

## 2020-10-21 RX ADMIN — HYDROCODONE BITARTRATE AND ACETAMINOPHEN 1 TABLET: 10; 325 TABLET ORAL at 04:27

## 2020-10-21 RX ADMIN — HYDROCODONE BITARTRATE AND ACETAMINOPHEN 1 TABLET: 10; 325 TABLET ORAL at 13:47

## 2020-10-21 RX ADMIN — Medication 2 SPRAY: at 08:11

## 2020-10-21 RX ADMIN — BISACODYL 10 MG: 5 TABLET, COATED ORAL at 11:21

## 2020-10-21 RX ADMIN — Medication 250 MG: at 08:11

## 2020-10-21 RX ADMIN — ATENOLOL 50 MG: 50 TABLET ORAL at 08:11

## 2020-10-21 RX ADMIN — HYDROCODONE BITARTRATE AND ACETAMINOPHEN 1 TABLET: 10; 325 TABLET ORAL at 08:11

## 2020-10-21 RX ADMIN — ENOXAPARIN SODIUM 40 MG: 40 INJECTION SUBCUTANEOUS at 08:11

## 2020-10-21 RX ADMIN — POLYETHYLENE GLYCOL 3350 17 G: 17 POWDER, FOR SOLUTION ORAL at 08:12

## 2020-10-21 RX ADMIN — SODIUM CHLORIDE 75 ML/HR: 9 INJECTION, SOLUTION INTRAVENOUS at 01:50

## 2020-10-21 RX ADMIN — HYDROCODONE BITARTRATE AND ACETAMINOPHEN 1 TABLET: 10; 325 TABLET ORAL at 00:41

## 2020-10-21 NOTE — PLAN OF CARE
Goal Outcome Evaluation:  Plan of Care Reviewed With: patient  Progress: improving  Outcome Summary: Vital signs stable. Arrow pump running at 6ml/hr. Pt has complained of pain and has been treated with PRN norco. Pt on room air. Will continue to monitor.

## 2020-10-21 NOTE — PROGRESS NOTES
Continued Stay Note  [facility]     Patient Name: Alin Lara  MRN: [id number]  Today's Date: 10/21/2020    Admit Date: 10/19/2020    Discharge Plan     Row Name 10/21/20 1400       Plan    Plan Comments  Pt requesting bedside commode, TARAH contacted [place] with [employer] and she is currently not in the hospital and it will be approx 1 hour before she can return. Pt and wife can stop by store located on [address] and pick BSC up or it can be delivered tomorrow. CM spoke with pt and wife at bedside and also offered to order from another company however it would be 1-2 hours to have delivered to bedside. Pt reports they will pick BSC up at store on the way home, CM provided wife with address and phone number opf [employer]. No further needs noted at this time.        Discharge Codes    No documentation.       Expected Discharge Date and Time     Expected Discharge Date Expected Discharge Time    Oct 21, 2020             Michelle Colin RN

## 2020-10-21 NOTE — PLAN OF CARE
Problem: Adult Inpatient Plan of Care  Goal: Plan of Care Review  Recent Flowsheet Documentation  Taken 10/21/2020 1009 by Asia Mccarthy  Progress: improving  Plan of Care Reviewed With:   patient   spouse  Outcome Summary: Pt able to ambulate 50', CGA with stable O2 stats and stable BP.  Pt capable of ambulating farther distances but purposely limited distance to save energy for d/c home.  Pt ready to d/c home from PT perspective

## 2020-10-21 NOTE — PLAN OF CARE
Problem: Adult Inpatient Plan of Care  Goal: Plan of Care Review  Recent Flowsheet Documentation  Taken 10/21/2020 1311 by Rebeca Garibay OT  Plan of Care Reviewed With:   patient   spouse  Outcome Summary: Interscalene infusing at 6, pt rates pain moderate L generalized shoulder. Reviewed shoulder precautions, ADL retraining to maintain, sling management and HEP. Pt and spouse needed cues for sling application and HEP, recommend HHOT to assist with carry-over. Pt needs BSC, notified CM. Recommend DC home with HHOT and initial 24/hr family assist. Issued rehab department phone number and encouraged them to call with questions.

## 2020-10-21 NOTE — OUTREACH NOTE
Prep Survey      Responses   [facility] facility patient discharged from?  [place]   Is LACE score < 7 ?  Yes   Eligibility  Readm Mgmt   Discharge diagnosis  s/p reverse total shoulder arthroplasty   Does the patient have one of the following disease processes/diagnoses(primary or secondary)?  Total Joint Replacement   Does the patient have Home health ordered?  No   Is there a DME ordered?  Yes   What DME was ordered?  [employer] [facility]   Comments regarding appointments  Pt had Covid history, no acute symptoms or treatment this admission   Medication alerts for this patient  Meds to Beds   Prep survey completed?  Yes          Deonna Shane, RN

## 2020-10-21 NOTE — THERAPY TREATMENT NOTE
Patient Name: Alin Lara  : 1935    MRN: [id number]                              Today's Date: 10/21/2020       Admit Date: 10/19/2020    Visit Dx:     ICD-10-CM ICD-9-CM   1. S/P reverse total shoulder arthroplasty, left  Z96.612 V43.61   2. Primary localized osteoarthrosis of left shoulder region  M19.012 715.11   3. Contracture of joint of left shoulder region  M24.512 718.41   4. Biceps tendinitis of left upper extremity  M75.22 726.12   5. Impaired mobility and activities of daily living  Z74.09 V49.89    Z78.9    6. Paroxysmal atrial fibrillation (CMS/HCC)  I48.0 427.31   7. Essential hypertension  I10 401.9     Patient Active Problem List   Diagnosis   • Paroxysmal atrial fibrillation (CMS/HCC)   • Basal cell carcinoma of skin   • Impotence of organic origin   • Gastroesophageal reflux disease   • Hyperlipidemia   • Hypertension   • Persistent insomnia   • Knee pain   • Osteoarthritis of lumbar spine   • Adiposity   • Overactive bladder   • Arthralgia of hip   • Impaired glucose tolerance   • Prostatism   • History of pulmonary embolism (CMS/HCC)   • Inflammation of sacroiliac joint (CMS/HCC)   • Generalized osteoarthritis   • Preventative health care   • Vertigo   • TIA (transient ischemic attack)   • Lymphocytic colitis   • Esophageal stricture   • Left shoulder pain   • Primary localized osteoarthrosis of left shoulder region   • Contracture of joint of left shoulder region   • Biceps tendinitis of left upper extremity   • S/P reverse total shoulder arthroplasty, left     Past Medical History:   Diagnosis Date   • Basal cell carcinoma    • Blood loss     post op   • ED (erectile dysfunction)     Responsive to Viagra   • Failed total knee, right (CMS/HCC)    • Fasting hypoglycemia 2016    Hemoglobin A1c normal   • Generalized osteoarthritis    • GERD (gastroesophageal reflux disease)     History esophageal stricture   • History of blood clots    • HNP (herniated nucleus pulposus), lumbar ,  2014    L5 L6   • Hyperglycemia     not diabetic, pt states hes never has high blood sugar per pt    • Hyperlipidemia    • Hypertension    • Microscopic colitis 2017    Positive biopsy - colonoscopy   • OAB (overactive bladder) 2000    Persistent urgency   • Obesity Adulthood    2012 body weight 244 BMI 32   • Painful total knee replacement (CMS/HCC)    • Paroxysmal atrial fibrillation (CMS/HCC) 1991    one episode - cardioverted   • Prostatism 2014   • Pulmonary embolism (CMS/HCC) 1991    after injury and protracted immobilization   • Right knee DJD 2015    Partial knee arthroplasty   • Shoulder dislocation     Repeated episodes   • Sleep disturbances 1990    Frequently requires medication   • Transient cerebral ischemia 2010, 2016    Negative medical workup on both occasions   • Venous stasis    • Vertigo    • Wears glasses      Past Surgical History:   Procedure Laterality Date   • CARDIOVERSION  1991    Atrial fibrillation   • COLONOSCOPY      2018   • KNEE ARTHROPLASTY Right 2015    Partial    • KNEE ARTHROPLASTY Left    • KNEE ARTHROSCOPY Right 2015    Failed procedure   • LUMBAR DISC SURGERY  2014    Surgery ×2 Ruptured disc   • LUMBAR DISCECTOMY  1988    L5 L6    • TOTAL SHOULDER ARTHROPLASTY W/ DISTAL CLAVICLE EXCISION Left 10/19/2020    Procedure: TOTAL SHOULDER REVERSE ARTHROPLASTY LEFT;  Surgeon: Tan Miranda MD;  Location: Replaced by [facility];  Service: Orthopedics;  Laterality: Left;     General Information     Row Name 10/21/20 1001          Physical Therapy Time and Intention    Document Type  therapy note (daily note)  -KG     Mode of Treatment  physical therapy  -KG     Row Name 10/21/20 1001          General Information    Patient Profile Reviewed  yes  -KG     Prior Level of Function  independent:  -KG     Existing Precautions/Restrictions  fall;left;shoulder;non-weight bearing;other (see comments)  -KG     Row Name 10/21/20 1001          Cognition    Orientation Status (Cognition)  oriented x 4   -KG     Row Name 10/21/20 1001          Safety Issues, Functional Mobility    Impairments Affecting Function (Mobility)  pain;range of motion (ROM);sensation/sensory awareness  -KG       User Key  (r) = Recorded By, (t) = Taken By, (c) = Cosigned By    Initials Name Provider Type    Asia Landers Physical Therapist        Mobility     Row Name 10/21/20 1005          Bed Mobility    Comment (Bed Mobility)  UIC  -KG     Row Name 10/21/20 1005          Transfers    Comment (Transfers)  cues to push up with RUE  -KG     Row Name 10/21/20 1005          Sit-Stand Transfer    Sit-Stand [place] (Transfers)  contact guard  -KG     Row Name 10/21/20 1005          Gait/Stairs (Locomotion)    [place] Level (Gait)  contact guard  -KG     Assistive Device (Gait)  cane, quad  -KG     Distance in Feet (Gait)  50  -KG     Deviations/Abnormal Patterns (Gait)  gait speed decreased  -KG     Bilateral Gait Deviations  forward flexed posture;heel strike decreased  -KG     Left Sided Gait Deviations  weight shift ability decreased  -KG     Comment (Gait/Stairs)  Pt able to ambulate 50', CGA with stable O2 stats and stable BP.  Pt capable of ambulating farther distances but purposely limited distance to save energy for d/c home.  -KG     Row Name 10/21/20 1005          Mobility    Extremity Weight-bearing Status  left upper extremity  -KG     Left Upper Extremity (Weight-bearing Status)  non weight-bearing (NWB)  -KG       User Key  (r) = Recorded By, (t) = Taken By, (c) = Cosigned By    Initials Name Provider Type    Asia Landers Physical Therapist        Obj/Interventions     Row Name 10/21/20 1008          Balance    Static Sitting Balance  WNL  -KG     Dynamic Sitting Balance  WNL  -KG     Dynamic Standing Balance  WFL  -KG       User Key  (r) = Recorded By, (t) = Taken By, (c) = Cosigned By    Initials Name Provider Type    Asia Landers Physical Therapist        Goals/Plan    No documentation.        Clinical Impression     Row Name 10/21/20 1009          Pain    Additional Documentation  Pain Scale: Numbers Pre/Post-Treatment (Group)  -KG     Row Name 10/21/20 1009          Pain Scale: Numbers Pre/Post-Treatment    Pretreatment Pain Rating  2/10  -KG     Posttreatment Pain Rating  2/10  -KG     Pain Location - Side  Left  -KG     Pain Location - Orientation  generalized  -KG     Pain Location  shoulder  -KG     Pain Intervention(s)  Repositioned;Ambulation/increased activity  -KG     Row Name 10/21/20 1009          Plan of Care Review    Plan of Care Reviewed With  patient;spouse  -KG     Progress  improving  -KG     Outcome Summary  Pt able to ambulate 50', CGA with stable O2 stats and stable BP.  Pt capable of ambulating farther distances but purposely limited distance to save energy for d/c home.  Pt ready to d/c home from PT perspective  -KG     Row Name 10/21/20 1009          Vital Signs    Pre Systolic BP Rehab  138  -KG     Pre Treatment Diastolic BP  72  -KG     Pre SpO2 (%)  94  -KG     O2 Delivery Pre Treatment  room air  -KG     Intra SpO2 (%)  92  -KG     O2 Delivery Intra Treatment  room air  -KG     Post SpO2 (%)  95  -KG     O2 Delivery Post Treatment  room air  -KG     Pre Patient Position  Sitting  -KG     Intra Patient Position  Standing  -KG     Post Patient Position  Sitting  -KG     Row Name 10/21/20 1009          Positioning and Restraints    Pre-Treatment Position  sitting in chair/recliner  -KG     Post Treatment Position  chair  -KG     In Chair  notified nsg;call light within reach;encouraged to call for assist;exit alarm on;with family/caregiver;legs elevated  -KG       User Key  (r) = Recorded By, (t) = Taken By, (c) = Cosigned By    Initials Name Provider Type    KG Asia Mccarthy Physical Therapist        Outcome Measures     Row Name 10/21/20 1013          How much help from another person do you currently need...    Turning from your back to your side while in flat bed  without using bedrails?  3  -KG     Moving from lying on back to sitting on the side of a flat bed without bedrails?  3  -KG     Moving to and from a bed to a chair (including a wheelchair)?  3  -KG     Standing up from a chair using your arms (e.g., wheelchair, bedside chair)?  3  -KG     Climbing 3-5 steps with a railing?  3  -KG     To walk in hospital room?  3  -KG     AM-PAC 6 Clicks Score (PT)  18  -KG     Row Name 10/21/20 1013          Functional Assessment    Outcome Measure Options  AM-PAC 6 Clicks Basic Mobility (PT)  -KG       User Key  (r) = Recorded By, (t) = Taken By, (c) = Cosigned By    Initials Name Provider Type    KG Asia Mccarthy Physical Therapist        Physical Therapy Education                 Title: PT OT SLP Therapies (In Progress)     Topic: Physical Therapy (Done)     Point: Mobility training (Done)     Learning Progress Summary           Patient Acceptance, E,TB, VU by KG at 10/21/2020 1013    Acceptance, E,TB, NR by KG at 10/20/2020 1012                   Point: Precautions (Done)     Learning Progress Summary           Patient Acceptance, E,TB, VU by KG at 10/21/2020 1013    Acceptance, E,TB, NR by KG at 10/20/2020 1012                               User Key     Initials Effective Dates Name Provider Type Discipline    KG 08/28/19 -  Asia Mccarthy Physical Therapist PT              PT Recommendation and Plan     Plan of Care Reviewed With: patient, spouse  Progress: improving  Outcome Summary: Pt able to ambulate 50', CGA with stable O2 stats and stable BP.  Pt capable of ambulating farther distances but purposely limited distance to save energy for d/c home.  Pt ready to d/c home from PT perspective     Time Calculation:   PT Charges     Row Name 10/21/20 1015             Time Calculation    Start Time  0905  -KG      PT Received On  10/21/20  -KG      PT Goal Re-Cert Due Date  10/30/20  -KG         Time Calculation- PT    Total Timed Code Minutes- PT  38 minute(s)  -KG          Timed Charges    51917 - Gait Training Minutes   18  -KG      54095 - PT Therapeutic Activity Minutes  20  -KG        User Key  (r) = Recorded By, (t) = Taken By, (c) = Cosigned By    Initials Name Provider Type    Asia Landers Physical Therapist        Therapy Charges for Today     Code Description Service Date Service Provider Modifiers Qty    97603582394 HC PT THERAPEUTIC ACT EA 15 MIN 10/20/2020 Asia Mccarthy GP 1    34388052584 HC PT EVAL MOD COMPLEXITY 4 10/20/2020 Asia Mccarhty GP 1    58989301986 HC GAIT TRAINING EA 15 MIN 10/21/2020 Asia Mccarthy GP 1    02910147260 HC PT THERAPEUTIC ACT EA 15 MIN 10/21/2020 Asia Mccarthy GP 2          PT G-Codes  Outcome Measure Options: AM-PAC 6 Clicks Basic Mobility (PT)  AM-PAC 6 Clicks Score (PT): 18  AM-PAC 6 Clicks Score (OT): 14    Asia Mccarthy  10/21/2020

## 2020-10-21 NOTE — PLAN OF CARE
Problem: Adult Inpatient Plan of Care  Goal: Plan of Care Review  Outcome: Met  Goal: Patient-Specific Goal (Individualized)  Outcome: Met  Goal: Absence of Hospital-Acquired Illness or Injury  Outcome: Met  Intervention: Identify and Manage Fall Risk  Recent Flowsheet Documentation  Taken 10/21/2020 0811 by Celena Scott RN  Safety Promotion/Fall Prevention:   activity supervised   assistive device/personal items within reach   clutter free environment maintained   fall prevention program maintained   nonskid shoes/slippers when out of bed   room organization consistent   safety round/check completed  Intervention: Prevent and Manage VTE (venous thromboembolism) Risk  Recent Flowsheet Documentation  Taken 10/21/2020 0811 by Celena Scott RN  VTE Prevention/Management: (Lovenox)   bilateral   sequential compression devices off   bleeding risk factor(s) identified  Goal: Optimal Comfort and Wellbeing  Outcome: Met  Intervention: Provide Person-Centered Care  Recent Flowsheet Documentation  Taken 10/21/2020 0811 by Celena Scott RN  Trust Relationship/Rapport:   care explained   choices provided  Goal: Readiness for Transition of Care  Outcome: Met     Problem: Fall Injury Risk  Goal: Absence of Fall and Fall-Related Injury  Outcome: Met  Intervention: Promote Injury-Free Environment  Recent Flowsheet Documentation  Taken 10/21/2020 0811 by Celena Scott RN  Safety Promotion/Fall Prevention:   activity supervised   assistive device/personal items within reach   clutter free environment maintained   fall prevention program maintained   nonskid shoes/slippers when out of bed   room organization consistent   safety round/check completed     Problem: Bleeding (Shoulder Arthroplasty)  Goal: Absence of Bleeding  Outcome: Met     Problem: Bowel Elimination Impaired (Shoulder Arthroplasty)  Goal: Effective Bowel Elimination  Outcome: Met  Intervention: Promote Effective Bowel Elimination  Recent  Flowsheet Documentation  Taken 10/21/2020 0811 by Celena Scott RN  Bowel Elimination Promotion:   adequate fluid intake promoted   ambulation promoted     Problem: Joint Function Impaired (Shoulder Arthroplasty)  Goal: Optimal Functional Ability  Outcome: Met  Intervention: Protect Joint Integrity  Recent Flowsheet Documentation  Taken 10/21/2020 0811 by Celena Scott RN  Equipment:   arm, left   arm sling   On:     Problem: Infection (Shoulder Arthroplasty)  Goal: Absence of Infection Signs and Symptoms  Outcome: Met     Problem: Ongoing Anesthesia Effects (Shoulder Arthroplasty)  Goal: Anesthesia/Sedation Recovery  Outcome: Met  Intervention: Optimize Anesthesia Recovery  Recent Flowsheet Documentation  Taken 10/21/2020 0811 by Celena Scott RN  Patient Tolerance (IS): fair  Safety Promotion/Fall Prevention:   activity supervised   assistive device/personal items within reach   clutter free environment maintained   fall prevention program maintained   nonskid shoes/slippers when out of bed   room organization consistent   safety round/check completed  Administration (IS):   self-administered   instruction provided, follow-up  Level Incentive Spirometer (mL): 1250  Number of Repetitions (IS): 10     Problem: Postoperative Nausea and Vomiting (Shoulder Arthroplasty)  Goal: Nausea and Vomiting Relief  Outcome: Met     Problem: Pain (Shoulder Arthroplasty)  Goal: Acceptable Pain Control  Outcome: Met  Intervention: Prevent or Manage Pain  Recent Flowsheet Documentation  Taken 10/21/2020 0811 by Celena Scott RN  Pain Management Interventions:   see MAR   quiet environment facilitated  Diversional Activities:   smartphone   television     Problem: Postoperative Urinary Retention (Shoulder Arthroplasty)  Goal: Effective Urinary Elimination  Outcome: Met  Intervention: Monitor and Manage Urinary Retention  Recent Flowsheet Documentation  Taken 10/21/2020 0811 by Celena Scott RN  Urinary  Elimination Promotion: frequent voiding encouraged   Goal Outcome Evaluation:  Plan of Care Reviewed With: patient, spouse  Progress: improving

## 2020-10-21 NOTE — PROGRESS NOTES
[place]    Acute pain service Inpatient Progress Note    Patient Name: Alin Lara  :  1935  MRN:  [id number]        Acute Pain  Service Inpatient Progress Note:    Analgesia:Excellent  Pain Score:2/10  LOC: alert and awake  Resp Status: room air  Cardiac: VS stable  Side Effects:None  Catheter Site:clean, dressing intact and dry  Cath type: peripheral nerve cath with ON Q  Infusion rate: 6ml/hr  Catheter Plan:Catheter to remain Insitu and Continue catheter infusion rate unchanged  Comments: -patients pain is well managed. He wants to go home today.   --------------------------------------------------------------------------------  Physical Therapy Manual Muscle Testing Results:   ]    Physical Therapy - Plan of Care Review - Outcome Summary:  Outcome Summary: PT eval performed: Pt presenting s/p day 1 L shoulder arthroplasty with generalized weakness and SOA.  Education on L shoulder precautions, min-A bed mobility, CGA STS,Pt able to ambulate 25', min-A, RUE support and close chair follow.  Pt initially having issues with orthostatic BP upon standing (98/51mmHg) and had to wait for BP to improve.  Pt with resting SpO2 at 93% with tendency to drop to upper 80's with talking and mobility. Pt educated on importance of deferring ambulation until vitals improved but patient impulsive and began walking, was able to utilize close chair follow and min-A for 25' before convincing pt to sit.  O2 sats returned to 92% after 1 min sitting break.  Pt very eager to return home today but concern for safety until mobility is safer and vitals more stable.  Recommend home with assist and HH when medically stable and improved mobility. (10/20/20 1005)]    Occupational Therapy - Plan of Care Review - Outcome Summary:  Outcome Summary: Interscalene infusing at 4, post pain level 7/10 left genrealized shoulder. Educated pt and spouse on L shoulder precautions, ADL retaining to maintain, sling management and LUE HEP.  Vitals stable seated level on RA. Recommend DC home with family assist and HHOT. (10/20/20 3547)]  ----------------------------------------------------------------------------------

## 2020-10-21 NOTE — DISCHARGE SUMMARY
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 male presented for scheduled surgery by Dr. Miranda.  He underwent left reverse total shoulder arthroplasty under general anesthesia.  He tolerated surgery well and was admitted for further medical management.  His shoulder has been painful with limited range of motion for many years.  He denies use of assistive device for ambulation or recent falls.    After admit:    Patient was provided pain medications as needed for pain control, along with interscalene nerve block infusion of Ropivacaine.    Adjustments were made to pain medications to optimize postop pain management.   Risks and benefits of opiate medications discussed with patient.  Alin report in chart was reviewed prior to discharge.    The patient was seen by OT and was taught exercises for his left arm.  The patient used an IS for atelectasis prophylaxis and mechanicals for DVT prophylaxis.    Home medications were resumed as appropriate, and labs were monitored and remained fairly stable.     With the progress he has made, he is ready for DC home today.      The patient will have an arrow pump ( instructed on it during this admit)  Discussed with  patient regarding plan and he shows understanding and agreement.        Procedures Performed    Pre-operative Diagnosis                          1. Severe primary glenohumeral joint osteoarthritis with B3 glenoid, posterior glenoid bone loss  2. Shoulder biceps tendinopathy     Post Operative Diagnosis  1. same     Operative Procedure                                                        1. REVERSE shoulder arthroplasty Reverse total shoulder arthroplasty CPT code 03503  2. Biceps tenodesis    Surgeon: Tan Miranda M.D.     Pertinent Test Results:    I reviewed the patient's new clinical results.   Results from last 7 days   Lab Units 10/20/20  0506   WBC 10*3/mm3 15.02*   HEMOGLOBIN g/dL 13.5   HEMATOCRIT % 43.5   PLATELETS 10*3/mm3 138*     Results from last 7 days   Lab Units 10/21/20  0728 10/20/20  0812   SODIUM mmol/L 135* 139   POTASSIUM mmol/L 4.7 3.9   CHLORIDE mmol/L 105 111*   CO2 mmol/L 22.0 22.0   BUN mg/dL 12 14   CREATININE mg/dL 0.97 0.86   CALCIUM mg/dL 8.4* 6.9*   GLUCOSE mg/dL 117* 112*     Results for CAITLYN LOBATO (MRN [id number]) as of 10/21/2020 11:08   Ref. Range 10/20/2020 08:12 10/21/2020 07:28   Ionized Calcium Latest Ref Range: 1.12 - 1.32 mmol/L 0.99 (L) 1.21     I reviewed the patient's new imaging including images and reports.      Occupational Therapy: Interscalene infusing at 4, post pain level 7/10 left generalized shoulder. Educated pt and spouse on L shoulder precautions, ADL retaining to maintain, sling management and LUE HEP. Vitals stable seated level on RA. Recommend DC home with family assist and HHOT.      Physical therapy: Pt able to ambulate 50', CGA with stable O2 stats and stable BP.  Pt capable of ambulating farther distances but purposely limited distance to save energy for d/c home.  Pt ready to d/c home from PT perspective      Discharge Assessment:    Vital Signs  Visit Vitals  /84 (BP Location: Right arm, Patient Position: Lying)   Pulse 60   Temp 98.1  "°F (36.7 °C) (Oral)   Resp 18   Ht 186.7 cm (73.5\")   Wt 112 kg (248 lb)   SpO2 91%   BMI 32.28 kg/m²     Temp (24hrs), Av.2 °F (36.8 °C), Min:97.8 °F (36.6 °C), Max:98.7 °F (37.1 °C)      General Appearance:    Alert, cooperative, in no acute distress   Lungs:     Clear to auscultation,respirations regular, even and   unlabored    Heart:    Regular rhythm and normal rate, normal S1 and S2    Abdomen:     Normal bowel sounds, no masses, no organomegaly, soft   non-tender, non-distended, no guarding, no rebound tenderness   Extremities:   LUE in a sling, CDI bulky surgical dressing. Interscalene nerve block cath present. Distal pulses, cap refill, movements of fingers, wrist, intact.   Pulses:   Pulses palpable and equal bilaterally   Skin:   No bleeding, bruising or rash   Neurologic:   Cranial nerves 2 - 12 grossly intact       Discharge Disposition: Home          Discharge Medications      New Medications      Instructions Start Date   docusate sodium 100 MG capsule  Commonly known as: Colace   100 mg, Oral, 2 Times Daily      HYDROcodone-acetaminophen  MG per tablet  Commonly known as: NORCO   1 tablet, Oral, Every 4 Hours PRN      Ropivacine HCl-NaCl  Commonly known as: NAROPIN   6 mL/hr (12 mg/hr), Peripheral Nerve, Continuous         Changes to Medications      Instructions Start Date   oxybutynin XL 5 MG 24 hr tablet  Commonly known as: DITROPAN-XL  What changed: when to take this   5 mg, Oral, Every 12 Hours      rivaroxaban 20 MG tablet  Commonly known as: XARELTO  What changed: additional instructions   20 mg, Oral, Daily, Resume 10/22/2020      tamsulosin 0.4 MG capsule 24 hr capsule  Commonly known as: FLOMAX  What changed:   · how much to take  · how to take this  · when to take this   TAKE 1 CAPSULE EVERY 12 HOURS         Continue These Medications      Instructions Start Date   ascorbic acid 1000 MG tablet  Commonly known as: VITAMIN C   1 capsule, Oral, Daily      atenolol 50 MG " tablet  Commonly known as: TENORMIN   50 mg, Oral, Daily      CALCIUM-MAGNESIUM-ZINC PO   1 tablet, Oral, Daily      Centrum Adults tablet   1-2 tablets, Oral, Daily      Coenzyme Q10 200 MG capsule   200 mg, Oral, Daily      Viagra 100 MG tablet  Generic drug: sildenafil   0.5-1 tablets, Oral, As Needed      Vitamin D-3 25 MCG (1000 UT) capsule   Oral      Zinc 50 MG capsule   Oral             Discharge Diet:   Diet Instructions     Diet:      Diet Texture / Consistency: Regular    Resume home diet          Activity at Discharge: ELINOR carmen, ALEXANDRO  Activity Instructions     Discharge activity      No driving until cleared from using the sling and no longer taking narcotics.     PAIN:  You will be given a prescription for pain medicine.  Have these filled at your local pharmacy or where your insurance plan has made arrangements. Take the pain medication as prescribed.  Do not combine the medications that we prescribe with any other pain medications. The pain medication can sometimes cause constipation.  We recommend over-the-counter medications from your nearest pharmacy to treat constipation.    NAUSEA: It is not unexpected to have nausea and sometimes vomiting as a side effect of anesthesia.  You will be given a prescription to help the nausea.  Take the anti-nausea medicine every 6 hours if needed.  Slowly resume your regular diet as tolerated.    ACTIVITY:  You may bend and straighten your fingers, wrist, and elbow.  Do not raise your arm up or away from your body on your own.  Do not carry anything heavier than 1 pound with your operated arm.  Do not use your arm to push yourself up from a chair.  Do not lean on your elbow.    SLING:  A sling is necessary to support the arm.  Wear the sling at all times.  You may remove the sling for showering or to put a shirt on.      ICE PACK  You should place an ice pack for 15 to 20 minutes.  This can be repeated every 2 hours as needed for at least the first 3 days after  surgery.  Place a towel between the ice pack and the skin so the ice pack is not directly touching the skin.  The ice pack will help reduce swelling and pain.      WOUNDS:  Remove your bandage at home 3 days after surgery.  There is no need to apply additional bandages as long as the wound is dry.  There will be steri-strips (like band-aids against the skin) covering up each incision site and they may have a small amount of dried blood on them.  Keep the steri-strips in place for 10-14 days - they will eventually peel off in the shower.  The incision may be sore, swell and develop bruising over the next several days.  This will go away and no special care is needed.      BATHING:  It is safe to take a shower 3 days after surgery.  To bathe, remove the sling and leave your arm by your side. To wash under your armpit, lean over and let the arm fall away from your body.  DO NOT raise your arm!  Do not scrub the incision site just allow water to run over them.  Your hand and forearm skin may be dry and peel due to the strong disinfectant soap we use at the time of surgery.    PRECAUTIONS:     If you have a temperature greater than 101.5 degrees, severe pain, or redness in your shoulder, please contact the office.    FOLLOW-UP:  Call the office and ask for the appointment desk.  Make an appointment to return to clinic 2 weeks after your surgery if this has not been done for you by the nurse at discharge.  You will receive a prescription for physical therapy at your first appointment after surgery.    Non weight bearing (specify restrictions)      You may bend and straighten your fingers, wrist, and elbow.  Do not raise your surgical arm up or away from your body on your own.  Do not carry anything heavier than 1 pound with your operated arm.  Do not use your arm to push yourself up from a chair.  Do not lean on your elbow. A sling is necessary to support the arm.  Wear the sling at all times.  You may remove the sling  for showering or to put a shirt on.    Patient may shower      No soaking the shoulder in a tub bath, pool, or hot tub until cleared.  It is safe to take a shower 3 days after surgery.  To bathe, remove the sling and leave your arm by your side. To wash under your armpit, lean over and let the arm fall away from your body.  DO NOT raise your arm!  Do not scrub the incision site just allow water to run over them.  Your hand and forearm skin may be dry and peel due to the strong disinfectant soap we use at the time of surgery.          Follow-up Appointments  Dr. Miranda per his orders      NANI Ansari  10/21/20  11:08 EDT

## 2020-10-21 NOTE — PROGRESS NOTES
Referring Provider: Tan Miranda MD     Orthopedic Shoulder Surgery Note - Tan Miranda MD    Subjective:  Feeling better this AM.  I checked on his last PM as well and he was feeling better.  Ready for dc home today.  Case management team working to arrange home health PT.    Medications/Allergies:    Current Facility-Administered Medications:   •  acetaminophen (TYLENOL) tablet 325 mg, 325 mg, Oral, Q8H PRN, Tan Miranda MD, 325 mg at 10/20/20 1007  •  atenolol (TENORMIN) tablet 50 mg, 50 mg, Oral, Daily, Rebeca Coronel APRN, 50 mg at 10/19/20 1616  •  bisacodyl (DULCOLAX) suppository 10 mg, 10 mg, Rectal, Daily PRN, Chey Carballo MD  •  diphenhydrAMINE (BENADRYL) capsule 25 mg, 25 mg, Oral, Q6H PRN **OR** diphenhydrAMINE (BENADRYL) injection 25 mg, 25 mg, Intravenous, Q6H PRN, Tan Miranda MD  •  enoxaparin (LOVENOX) syringe 40 mg, 40 mg, Subcutaneous, Daily, Tan Miranda MD, 40 mg at 10/20/20 0949  •  HYDROcodone-acetaminophen (NORCO)  MG per tablet 1 tablet, 1 tablet, Oral, Q4H PRN, Tan Miranda MD, 1 tablet at 10/21/20 0427  •  HYDROmorphone (DILAUDID) injection 0.5 mg, 0.5 mg, Intravenous, Q2H PRN, 0.5 mg at 10/20/20 1823 **AND** naloxone (NARCAN) injection 0.1 mg, 0.1 mg, Intravenous, Q5 Min PRN, Tan Miranda MD  •  labetalol (NORMODYNE,TRANDATE) injection 10 mg, 10 mg, Intravenous, Q4H PRN, Rebeca Coronel, APRN  •  ondansetron (ZOFRAN) injection 4 mg, 4 mg, Intravenous, Q6H PRN, Rebeca Coronel, APRN, 4 mg at 10/20/20 1831  •  oxybutynin XL (DITROPAN-XL) 24 hr tablet 5 mg, 5 mg, Oral, Nightly, Rebeca Coronel, APRN, 5 mg at 10/20/20 2035  •  oxymetazoline (AFRIN) nasal spray 2 spray, 2 spray, Each Nare, BID, Chey Carballo MD, 2 spray at 10/20/20 2036  •  phenol (CHLORASEPTIC) 1.4 % liquid 2 spray, 2 spray, Mouth/Throat, Q2H PRN, Chey Carballo MD, 2 spray at 10/19/20 2141  •  polyethylene glycol (MIRALAX) packet 17 g,  17 g, Oral, Daily, Chey Carballo MD, 17 g at 10/20/20 1231  •  ropivacaine (Naropin) 0.2% in NS infusion (ARROW Pump), 6 mL/hr, Peripheral Nerve, Continuous, Henry Jennings MD, Last Rate: 6 mL/hr at 10/20/20 1620, 6 mL/hr at 10/20/20 1620  •  saccharomyces boulardii (FLORASTOR) capsule 250 mg, 250 mg, Oral, BID, Chey Carballo MD, 250 mg at 10/20/20 1550  •  sennosides-docusate (PERICOLACE) 8.6-50 MG per tablet 2 tablet, 2 tablet, Oral, BID PRN, Tan Miranda MD  •  sodium chloride 0.9 % bolus 500 mL, 500 mL, Intravenous, TID PRN, Rebeca Coronel APRN  •  sodium chloride 0.9 % infusion, 75 mL/hr, Intravenous, Continuous, Tan Miranda MD, Last Rate: 75 mL/hr at 10/21/20 0150, 75 mL/hr at 10/21/20 0150  •  tamsulosin (FLOMAX) 24 hr capsule 0.8 mg, 0.8 mg, Oral, Nightly, Rebeca Coronel APRN, 0.8 mg at 10/20/20 2035  Allergies   Allergen Reactions   • Pravastatin Other (See Comments)     Nocturnal leg cramps   • Atorvastatin      Fatigue   • Qsymia [Phentermine-Topiramate] Mental Status Change   • Topiramate Myalgia       Objective:  Vital Signs   Temp:  [97.8 °F (36.6 °C)-98.7 °F (37.1 °C)] 97.9 °F (36.6 °C)  Heart Rate:  [58-69] 60  Resp:  [16-20] 18  BP: ()/(52-86) 166/82    Results Review:   I reviewed the patient's new clinical results.  Results from last 7 days   Lab Units 10/20/20  0506   WBC 10*3/mm3 15.02*   HEMOGLOBIN g/dL 13.5   HEMATOCRIT % 43.5   PLATELETS 10*3/mm3 138*     Results from last 7 days   Lab Units 10/20/20  0812   SODIUM mmol/L 139   POTASSIUM mmol/L 3.9   CHLORIDE mmol/L 111*   CO2 mmol/L 22.0   BUN mg/dL 14   CREATININE mg/dL 0.86   GLUCOSE mg/dL 112*   CALCIUM mg/dL 6.9*         Results from last 7 days   Lab Units 10/20/20  0812   SODIUM mmol/L 139   POTASSIUM mmol/L 3.9   CHLORIDE mmol/L 111*   CO2 mmol/L 22.0   BUN mg/dL 14   CREATININE mg/dL 0.86   CALCIUM mg/dL 6.9*   GLUCOSE mg/dL 112*            Physical Exam:  General: comfortable, not on  O2 this AM  Vascular: 2+ radial pulse  Dressing cdi  Ax sensation intact  SLT intact distally    Assessment/Plan:  Desires dc home today  I appreciate IM, CM, PT/OT teams    POD #: 2  Feeling good this AM  Pain control - well controlled  Drain -removed POD1  Dressing - clean and can be removed by patient on POD#3 at home.  Okay for RN to replace prior to discharge if dressing is saturated  Sling - in place  Activity - no weight bearing on affected side  PT/OT - sling teaching, non-weight bearing, elbow/wrist/hand exercises only, pendulums for showering on POD #3 at home and for dressing  DVT Prophylaxis -Lovenox and SCDs as inpatient recommended --will resume Xarelto on 10/22  Antibiotics - complete postoperative antibiotic course  Diet - continue diet  Anesthesia - RN will arrange with anesthesia team for OnQ prior to discharge     Social work-patient does desire home health and physical therapy at home.    Tan Miranda MD, FAAOS  Orthopedic Surgeon  Fellowship-trained Shoulder and Elbow Surgeon  [facility]  Orthopedics and Sports Medicine  [address] [place] [address]. [address]  [place] [address]      Tan Miranda MD  10/21/20  07:38 EDT

## 2020-10-21 NOTE — THERAPY DISCHARGE NOTE
Acute Care - Occupational Therapy Discharge  [facility]    Patient Name: Alin Lara  : 1935    MRN: [id number]                              Today's Date: 10/21/2020       Admit Date: 10/19/2020    Visit Dx:     ICD-10-CM ICD-9-CM   1. S/P reverse total shoulder arthroplasty, left  Z96.612 V43.61   2. Primary localized osteoarthrosis of left shoulder region  M19.012 715.11   3. Contracture of joint of left shoulder region  M24.512 718.41   4. Biceps tendinitis of left upper extremity  M75.22 726.12   5. Impaired mobility and activities of daily living  Z74.09 V49.89    Z78.9    6. Paroxysmal atrial fibrillation (CMS/HCC)  I48.0 427.31   7. Essential hypertension  I10 401.9     Patient Active Problem List   Diagnosis   • Paroxysmal atrial fibrillation (CMS/HCC)   • Basal cell carcinoma of skin   • Impotence of organic origin   • Gastroesophageal reflux disease   • Hyperlipidemia   • Hypertension   • Persistent insomnia   • Knee pain   • Osteoarthritis of lumbar spine   • Adiposity   • Overactive bladder   • Arthralgia of hip   • Impaired glucose tolerance   • Prostatism   • History of pulmonary embolism (CMS/HCC)   • Inflammation of sacroiliac joint (CMS/HCC)   • Generalized osteoarthritis   • Preventative health care   • Vertigo   • TIA (transient ischemic attack)   • Lymphocytic colitis   • Esophageal stricture   • Left shoulder pain   • Primary localized osteoarthrosis of left shoulder region   • Contracture of joint of left shoulder region   • Biceps tendinitis of left upper extremity   • S/P reverse total shoulder arthroplasty, left   • Leukocytosis, likely reactive   • Acute postoperative pain     Past Medical History:   Diagnosis Date   • Basal cell carcinoma    • Blood loss     post op   • ED (erectile dysfunction)     Responsive to Viagra   • Failed total knee, right (CMS/HCC)    • Fasting hypoglycemia 2016    Hemoglobin A1c normal   • Generalized osteoarthritis    • GERD (gastroesophageal  reflux disease)     History esophageal stricture   • History of blood clots    • HNP (herniated nucleus pulposus), lumbar 1988, 2014    L5 L6   • Hyperglycemia     not diabetic, pt states hes never has high blood sugar per pt    • Hyperlipidemia    • Hypertension    • Microscopic colitis 2017    Positive biopsy - colonoscopy   • OAB (overactive bladder) 2000    Persistent urgency   • Obesity Adulthood    2012 body weight 244 BMI 32   • Painful total knee replacement (CMS/HCC)    • Paroxysmal atrial fibrillation (CMS/HCC) 1991    one episode - cardioverted   • Prostatism 2014   • Pulmonary embolism (CMS/HCC) 1991    after injury and protracted immobilization   • Right knee DJD 2015    Partial knee arthroplasty   • Shoulder dislocation     Repeated episodes   • Sleep disturbances 1990    Frequently requires medication   • Transient cerebral ischemia 2010, 2016    Negative medical workup on both occasions   • Venous stasis    • Vertigo    • Wears glasses      Past Surgical History:   Procedure Laterality Date   • CARDIOVERSION  1991    Atrial fibrillation   • COLONOSCOPY      2018   • KNEE ARTHROPLASTY Right 2015    Partial    • KNEE ARTHROPLASTY Left    • KNEE ARTHROSCOPY Right 2015    Failed procedure   • LUMBAR DISC SURGERY  2014    Surgery ×2 Ruptured disc   • LUMBAR DISCECTOMY  1988    L5 L6    • TOTAL SHOULDER ARTHROPLASTY W/ DISTAL CLAVICLE EXCISION Left 10/19/2020    Procedure: TOTAL SHOULDER REVERSE ARTHROPLASTY LEFT;  Surgeon: Tan Miranda MD;  Location: [facility];  Service: Orthopedics;  Laterality: Left;     General Information     Row Name 10/21/20 1311          OT Time and Intention    Document Type  therapy note (daily note);discharge treatment  -AR     Mode of Treatment  occupational therapy  -AR     Row Name 10/21/20 1311          General Information    Existing Precautions/Restrictions  fall;left;shoulder;non-weight bearing;other (see comments) interscalene nerve catheter, Lex with pillow   -AR     Row Name 10/21/20 1311          Cognition    Orientation Status (Cognition)  oriented x 4  -AR     Row Name 10/21/20 1311          Safety Issues, Functional Mobility    Safety Issues Affecting Function (Mobility)  awareness of need for assistance;insight into deficits/self-awareness  -AR     Impairments Affecting Function (Mobility)  pain;range of motion (ROM);balance;strength  -AR       User Key  (r) = Recorded By, (t) = Taken By, (c) = Cosigned By    Initials Name Provider Type    AR Rebeca Garibay, GREGORIO Occupational Therapist        Mobility/ADL's     Row Name 10/21/20 1311          Bed Mobility    Comment (Bed Mobility)  Reviewed importance of maintaining NWB LUE, reviewed safe sleeping position. Pt will be sleeping in a recliner.  -AR     Row Name 10/21/20 1311          Transfers    Transfers  sit-stand transfer  -AR     Comment (Transfers)  Pt needed min assist transition sit-to-stand. Recommend BSC for home use, notified CM. Pt and spouse state they are comfortable with DC home. OT attempted to review transfer technique, pt stated he had been getting in/out of cars for years and would be fine. Pt and spouse state he has lift chair at home to assist with mobility.  -AR     Sit-Stand [place] (Transfers)  minimum assist (75% patient effort);verbal cues  -AR     Row Name 10/21/20 1311          Activities of Daily Living    81750 - OT Self Care/Mgmt Minutes  63  -AR     BADL Assessment/Intervention  bathing;upper body dressing;lower body dressing;feeding;toileting  -AR     Row Name 10/21/20 1311          Mobility    Extremity Weight-bearing Status  left upper extremity  -AR     Left Upper Extremity (Weight-bearing Status)  non weight-bearing (NWB)  -AR     Row Name 10/21/20 1311          Bathing Assessment/Intervention    Comment (Bathing)  Educated pt and spouse on L shoulder precautions, L axilla care to maintain. Reviewed that dressing site of nerve catheter is not water proof. Issued LH sponge  to assist with LBB.  -AR     Row Name 10/21/20 1311          Upper Body Dressing Assessment/Training    [place] Level (Upper Body Dressing)  doff;don;front opening garment;maximum assist (25% patient effort)  -AR     Position (Upper Body Dressing)  supported sitting  -AR     Comment (Upper Body Dressing)  Educated pt and spouse on L shoudler precautions, ADL retraining to maintain, sling management and care of interscalene nerve catheter during ADLS to avoid dislodgement. Pt and spouse able to don/doff sling with CGA. Recommend HHOT for continued training.  -AR     Row Name 10/21/20 1311          Self-Feeding Assessment/Training    [place] Level (Feeding)  liquids to mouth;supervision  -AR     Position (Self-Feeding)  supported sitting  -AR     Row Name 10/21/20 1311          Lower Body Dressing Assessment/Training    [place] Level (Lower Body Dressing)  don;pants/bottoms;maximum assist (25% patient effort);socks;shoes/slippers;other (see comments)  -AR     Position (Lower Body Dressing)  supported standing;supported sitting  -AR     Comment (Lower Body Dressing)  -- Issued AE including reacher and shoe horn.  -AR     Row Name 10/21/20 1311          Toileting Assessment/Training    Comment (Toileting)  Issued urinal per spouse request for home use.  -AR       User Key  (r) = Recorded By, (t) = Taken By, (c) = Cosigned By    Initials Name Provider Type    Rebeca Pratt, OT Occupational Therapist        Obj/Interventions     Row Name 10/21/20 1311          Elbow/Forearm (Therapeutic Exercise)    Elbow/Forearm (Therapeutic Exercise)  AROM (active range of motion)  -AR     Elbow/Forearm AROM (Therapeutic Exercise)  left;extension;flexion;10 repetitions;sitting  -AR     Row Name 10/21/20 1311          Wrist (Therapeutic Exercise)    Wrist (Therapeutic Exercise)  AROM (active range of motion)  -AR     Wrist AROM (Therapeutic Exercise)  left;flexion;extension;10 repetitions  -AR     Row Name  10/21/20 1311          Hand (Therapeutic Exercise)    Hand (Therapeutic Exercise)  AROM (active range of motion)  -AR     Hand AROM/AAROM (Therapeutic Exercise)  left;finger flexion;finger extension;10 repetitions  -AR     Row Name 10/21/20 1311          Balance    Balance Assessment  sitting static balance;sitting dynamic balance;standing static balance;standing dynamic balance  -AR     Static Sitting Balance  WNL  -AR     Dynamic Sitting Balance  WNL  -AR     Static Standing Balance  WNL  -AR     Dynamic Standing Balance  WFL  -AR     Row Name 10/21/20 1311          Therapeutic Exercise    Therapeutic Exercise  elbow/forearm;wrist;hand  -AR       User Key  (r) = Recorded By, (t) = Taken By, (c) = Cosigned By    Initials Name Provider Type    Rebeca Pratt OT Occupational Therapist        Goals/Plan     Row Name 10/21/20 1311          Transfer Goal 1 (OT)    Progress/Outcome (Transfer Goal 1, OT)  goal not met  -AR     Row Name 10/21/20 1311          Dressing Goal 1 (OT)    Progress/Outcome (Dressing Goal 1, OT)  goal not met  -AR     Row Name 10/21/20 1311          ROM Goal 1 (OT)    Progress/Outcome (ROM Goal 1, OT)  goal met  -AR       User Key  (r) = Recorded By, (t) = Taken By, (c) = Cosigned By    Initials Name Provider Type    Rebeca Pratt OT Occupational Therapist        Clinical Impression     Row Name 10/21/20 1311          Pain Scale: FACES Pre/Post-Treatment    Pain: FACES Scale, Pretreatment  2-->hurts little bit  -AR     Posttreatment Pain Rating  4-->hurts little more  -AR     Pain Location - Side  Left  -AR     Pain Location - Orientation  generalized  -AR     Pain Location  shoulder  -AR     Row Name 10/21/20 1311          Plan of Care Review    Plan of Care Reviewed With  patient;spouse  -AR     Outcome Summary  Interscalene infusing at 6, pt rates pain moderate L generalized shoulder. Reviewed shoulder precautions, ADL retraining to maintain, sling management and HEP. Pt and  spouse needed cues for sling application and HEP, recommend HHOT to assist with carry-over. Pt needs BSC, notified CM. Recommend DC home with HHOT and family assist. Issued rehab department phone number and encouraged them to call with questions.  -AR     Row Name 10/21/20 1311          Therapy Plan Review/Discharge Plan (OT)    Anticipated Discharge Disposition (OT)  home with 24/7 care;home with home health  -AR     Row Name 10/21/20 1311          Vital Signs    Pre SpO2 (%)  94  -AR     O2 Delivery Pre Treatment  room air  -AR     Intra SpO2 (%)  93  -AR     O2 Delivery Intra Treatment  room air  -AR     Pre Patient Position  Sitting  -AR     Intra Patient Position  Standing  -AR     Post Patient Position  Sitting  -AR     Row Name 10/21/20 1311          Positioning and Restraints    Pre-Treatment Position  sitting in chair/recliner  -AR     Post Treatment Position  chair  -AR     In Chair  sitting;exit alarm on;encouraged to call for assist;call light within reach;with family/caregiver;with brace;with nsg  -AR       User Key  (r) = Recorded By, (t) = Taken By, (c) = Cosigned By    Initials Name Provider Type    Rebeca Pratt OT Occupational Therapist        Outcome Measures     Row Name 10/21/20 1311          How much help from another is currently needed...    Putting on and taking off regular lower body clothing?  2  -AR     Bathing (including washing, rinsing, and drying)  2  -AR     Toileting (which includes using toilet bed pan or urinal)  2  -AR     Putting on and taking off regular upper body clothing  2  -AR     Taking care of personal grooming (such as brushing teeth)  3  -AR     Eating meals  3  -AR     AM-PAC 6 Clicks Score (OT)  14  -AR     Row Name 10/21/20 1311          Functional Assessment    Outcome Measure Options  AM-PAC 6 Clicks Daily Activity (OT)  -AR       User Key  (r) = Recorded By, (t) = Taken By, (c) = Cosigned By    Initials Name Provider Type    Rebcea Pratt, OT  Occupational Therapist        Occupational Therapy Education                 Title: PT OT SLP Therapies (Done)     Topic: Occupational Therapy (Done)     Point: ADL training (Done)     Description:   Instruct learner(s) on proper safety adaptation and remediation techniques during self care or transfers.   Instruct in proper use of assistive devices.              Learning Progress Summary           Patient Eager, E,TB,D,H, VU,NR by AR at 10/21/2020 1311    Acceptance, E, VU by MB at 10/21/2020 1128    Eager, E,TB,D,H, VU,NR by AR at 10/20/2020 1311   Family Eager, E,TB,D,H, VU,NR by AR at 10/21/2020 1311    Eager, E,TB,D,H, VU,NR by AR at 10/20/2020 1311                   Point: Home exercise program (Done)     Description:   Instruct learner(s) on appropriate technique for monitoring, assisting and/or progressing therapeutic exercises/activities.              Learning Progress Summary           Patient Eager, E,TB,D,H, VU,NR by AR at 10/21/2020 1311    Acceptance, E, VU by MB at 10/21/2020 1128    Eager, E,TB,D,H, VU,NR by AR at 10/20/2020 1311   Family Eager, E,TB,D,H, VU,NR by AR at 10/21/2020 1311    Eager, E,TB,D,H, VU,NR by AR at 10/20/2020 1311                   Point: Precautions (Done)     Description:   Instruct learner(s) on prescribed precautions during self-care and functional transfers.              Learning Progress Summary           Patient Eager, E,TB,D,H, VU,NR by AR at 10/21/2020 1311    Acceptance, E, VU by MB at 10/21/2020 1128    Eager, E,TB,D,H, VU,NR by AR at 10/20/2020 1311   Family Eager, E,TB,D,H, VU,NR by AR at 10/21/2020 1311    Eager, E,TB,D,H, VU,NR by AR at 10/20/2020 1311                   Point: Body mechanics (Done)     Description:   Instruct learner(s) on proper positioning and spine alignment during self-care, functional mobility activities and/or exercises.              Learning Progress Summary           Patient Eager, E,TB,D,H, SUKHI,NR by AR at 10/21/2020 1311    Acceptance, E,  VU by MB at 10/21/2020 1128    Eager, E,TB,D,H, VU,NR by AR at 10/20/2020 1311   Family Eager, E,TB,D,H, VU,NR by AR at 10/21/2020 1311    Eager, E,TB,D,H, VU,NR by AR at 10/20/2020 1311                               User Key     Initials Effective Dates Name Provider Type Discipline    AR 06/22/15 -  Rebeca Garibay OT Occupational Therapist OT    MB 07/31/19 -  Celena Scott RN Registered Nurse Nurse              OT Recommendation and Plan  Retired Outcome Summary/Treatment Plan (OT)  Anticipated Discharge Disposition (OT): home with 24/7 care, home with home health  Planned Therapy Interventions (OT): BADL retraining, orthotic fabrication/fitting/training, patient/caregiver education/training, ROM/therapeutic exercise, transfer/mobility retraining  Therapy Frequency (OT): daily  Plan of Care Review  Plan of Care Reviewed With: patient, spouse  Outcome Summary: Interscalene infusing at 6, pt rates pain moderate L generalized shoulder. Reviewed shoulder precautions, ADL retraining to maintain, sling management and HEP. Pt and spouse needed cues for sling application and HEP, recommend HHOT to assist with carry-over. Pt needs BSC, notified CM. Recommend DC home with HHOT and family assist. Issued rehab department phone number and encouraged them to call with questions.  Plan of Care Reviewed With: patient, spouse  Outcome Summary: Interscalene infusing at 6, pt rates pain moderate L generalized shoulder. Reviewed shoulder precautions, ADL retraining to maintain, sling management and HEP. Pt and spouse needed cues for sling application and HEP, recommend HHOT to assist with carry-over. Pt needs BSC, notified CM. Recommend DC home with HHOT and family assist. Issued rehab department phone number and encouraged them to call with questions.     Time Calculation:   Time Calculation- OT     Row Name 10/21/20 1311 10/21/20 1015          Time Calculation- OT    OT Start Time  1311  -AR  --     OT Received On   10/21/20  -AR  --     OT Goal Re-Cert Due Date  10/30/20  -AR  --        Timed Charges    19912 - OT Therapeutic Exercise Minutes  13  -AR  --     28541 - Gait Training Minutes   --  18  -KG     60384 - OT Self Care/Mgmt Minutes  63  -AR  --       User Key  (r) = Recorded By, (t) = Taken By, (c) = Cosigned By    Initials Name Provider Type    AR Rebeca Garibay OT Occupational Therapist    KG Asia Mccarthy Physical Therapist        Therapy Charges for Today     Code Description Service Date Service Provider Modifiers Qty    56837779780 HC OT THER PROC EA 15 MIN 10/20/2020 Rebeca Garibay, OT GO 1    89513133016 HC OT SELF CARE/MGMT/TRAIN EA 15 MIN 10/20/2020 Rebeca Garibay, OT GO 2    56779117705 HC OT EVAL LOW COMPLEXITY 4 10/20/2020 Rebeca Garibay, OT GO 1    13434117962 HC OT THER SUPP EA 15 MIN 10/20/2020 Rebeca Garibay, OT GO 1    26890946621 HC OT SELF CARE/MGMT/TRAIN EA 15 MIN 10/21/2020 Rebeca Garibay, OT GO 4    66704942389 HC OT THER PROC EA 15 MIN 10/21/2020 Rebeca Garibay, OT GO 1               Rebeca Garibay OT  10/21/2020

## 2020-10-21 NOTE — PLAN OF CARE
Problem: Adult Inpatient Plan of Care  Goal: Plan of Care Review  Recent Flowsheet Documentation  Taken 10/21/2020 1311 by Rebeca Garibay OT  Plan of Care Reviewed With:   patient   spouse  Outcome Summary: Interscalene infusing at 6, pt rates pain moderate L generalized shoulder. Reviewed shoulder precautions, ADL retraining to maintain, sling management and HEP. Pt and spouse needed cues for sling application and HEP, recommend HHOT to assist with carry-over. Pt needs BSC, notified CM. Recommend DC home with HHOT and 24/7 family assist. Issued rehab department phone number and encouraged them to call with questions.

## 2020-10-21 NOTE — PROGRESS NOTES
Continued Stay Note  [facility]     Patient Name: Alin Lara  MRN: [id number]  Today's Date: 10/21/2020    Admit Date: 10/19/2020    Discharge Plan     Row Name 10/21/20 0954       Plan    Plan Comments  CM contacted by therapy and pt needs quad cane ordered as the one provided to him yesterday belongs to therapy department. CM contacted [place] with [facility] and quad cane will be delivered to pt's bedside prior to discharge. No further needs noted at this time.        Discharge Codes    No documentation.             Michelle Colin RN

## 2020-10-22 ENCOUNTER — TELEPHONE (OUTPATIENT)
Dept: ORTHOPEDIC SURGERY | Facility: CLINIC | Age: 85
End: 2020-10-22

## 2020-10-22 NOTE — PROGRESS NOTES
[facility]keon    Nerve Cath Post Op Call    Patient Name: Alin Lara  :  1935  MRN:  [id number]  Date of Discharge: 10/21/2020    Nerve Cath Post Op Call:    Analgesia:Excellent  Pain Score:0/10  Side Effects:None  Catheter Site:clean  Patient Controlled ON Q pump infusion rate: 6ml/hr  Catheter Plan:Will continue with plan at home without changes and The patient was instructed to call ON CALL Anesthesia provider for any questions or problems  Patient/Family instructed to call ON CALL anesthesia provider for any questions or problems.  Patient Follow Up:

## 2020-10-22 NOTE — TELEPHONE ENCOUNTER
PETRA FROM [facility] CALLED STATED PATIENT HAS BEEN ADMITTED TO HOME HEALTH TODAY; PATIENT STATED HE WAS TO HAVE A PAIN PUMP REMOVED TOMORROW. PETRA WOULD LIKE A CALL BACK [phone].

## 2020-10-22 NOTE — TELEPHONE ENCOUNTER
I spoke with Elissa and she wanted to verify that the patient was able to take out the pain pump tomorrow. I told her that if it is getting low or it runs out then there is no real need for it anymore and he is able to go ahead and remove it. She understood and mentioned that he was doing well.    Massiel

## 2020-10-23 NOTE — PROGRESS NOTES
PER[facility] Mckeon    Nerve Cath Post Op Call    Patient Name: Alin Lara  :  1935  MRN:  [id number]  Date of Discharge: 10/21/2020    Nerve Cath Post Op Call:    Catheter Plan:Patient called, No answer. Message left to call [facility] pain service for any questions or complaints and Patient/Family member instructed to remove the catheter during telephone contact  Patient/Family instructed to call ON CALL anesthesia provider for any questions or problems.  Patient Follow Up:

## 2020-11-05 ENCOUNTER — OFFICE VISIT (OUTPATIENT)
Dept: ORTHOPEDIC SURGERY | Facility: CLINIC | Age: 85
End: 2020-11-05

## 2020-11-05 DIAGNOSIS — Z96.612 STATUS POST REVERSE TOTAL SHOULDER REPLACEMENT, LEFT: Primary | ICD-10-CM

## 2020-11-05 PROCEDURE — 99024 POSTOP FOLLOW-UP VISIT: CPT | Performed by: ORTHOPAEDIC SURGERY

## 2020-11-05 NOTE — PROGRESS NOTES
[facility] [place] Orthopaedic Surgery Office Follow Up       Office Follow Up Visit       Patient Name: Alin Lara    Chief Complaint:   Chief Complaint   Patient presents with   • Post-op     17 days s/p Total Shoulder Reverse Arthroplasty Left 10/19/20       Referring Physician: No ref. provider found    History of Present Illness:   It has been 17  day(s) since Alin Lara's last visit. Alin Lara returns to clinic today for F/U: postoperative follow-up of leftBody Part: shoulderReason: arthroplasty (reverse). The issue has been ongoing for 3 week(s). Alin Lara rates HIS/HER: hispain at 2/10 on the pain scale. Previous/current treatments: bracing and cane/walker. Current symptoms:Symptoms: pain. The pain is worse with certain movements; resting improves the pain. Overall, he/she: heis doing better. I have reviewed the patient's history of present illness as noted/entered above.    I have reviewed the patient's past medical history, surgical history, social history, family history, medications, and allergies as noted in the electronic medical record and as noted/entered.  I have reviewed the patient's review of systems as noted/enter and updated as noted in the patient's HPI.      Shoulder: LEFT    Date of Surgery: 10/19/2020    2 weeks post-surgery    Operation: Primary Reverse Total Shoulder Arthroplasty with augment for severe B3, biplanar deformity    Subjective:  The patient is having no issues following surgery with an anticipated amount of postoperative pain.  The patient reports compliance with sling use.  No fever, chills, or systemic symptoms.  No chest pain or shortness of breath.    Doing well at this point    Diagnostic Imaging:   3 views of the shoulder (Grashey, Axillary, and Scapular Y views) do not reveal any periprosthetic fracture.  The shoulder is well aligned and no dislocation is noted.  The components are in excellent  position.           Subjective   Subjective      Review of Systems   Constitutional: Negative.  Negative for chills, fatigue and fever.   HENT: Negative.  Negative for congestion and dental problem.    Eyes: Negative.  Negative for blurred vision.   Respiratory: Negative.  Negative for shortness of breath.    Cardiovascular: Negative.  Negative for leg swelling.   Gastrointestinal: Negative.  Negative for abdominal pain.   Endocrine: Negative.  Negative for polyuria.   Genitourinary: Negative.  Negative for difficulty urinating.   Musculoskeletal: Positive for arthralgias.   Skin: Negative.    Allergic/Immunologic: Negative.    Neurological: Negative.    Hematological: Negative.  Negative for adenopathy.   Psychiatric/Behavioral: Negative.  Negative for behavioral problems.        Past Medical History:   Past Medical History:   Diagnosis Date   • Basal cell carcinoma    • Blood loss     post op   • ED (erectile dysfunction)     Responsive to Viagra   • Failed total knee, right (CMS/HCC)    • Fasting hypoglycemia 2016    Hemoglobin A1c normal   • Generalized osteoarthritis    • GERD (gastroesophageal reflux disease)     History esophageal stricture   • History of blood clots    • HNP (herniated nucleus pulposus), lumbar 1988, 2014    L5 L6   • Hyperglycemia     not diabetic, pt states hes never has high blood sugar per pt    • Hyperlipidemia    • Hypertension    • Microscopic colitis 2017    Positive biopsy - colonoscopy   • OAB (overactive bladder) 2000    Persistent urgency   • Obesity Adulthood    2012 body weight 244 BMI 32   • Painful total knee replacement (CMS/HCC)    • Paroxysmal atrial fibrillation (CMS/HCC) 1991    one episode - cardioverted   • Prostatism 2014   • Pulmonary embolism (CMS/HCC) 1991    after injury and protracted immobilization   • Right knee DJD 2015    Partial knee arthroplasty   • Shoulder dislocation     Repeated episodes   • Sleep disturbances 1990    Frequently requires medication   •  Transient cerebral ischemia , 2016    Negative medical workup on both occasions   • Venous stasis    • Vertigo    • Wears glasses        Past Surgical History:   Past Surgical History:   Procedure Laterality Date   • CARDIOVERSION      Atrial fibrillation   • COLONOSCOPY      2018   • KNEE ARTHROPLASTY Right 2015    Partial    • KNEE ARTHROPLASTY Left    • KNEE ARTHROSCOPY Right 2015    Failed procedure   • LUMBAR DISC SURGERY  2014    Surgery ×2 Ruptured disc   • LUMBAR DISCECTOMY      L5 L6    • SHOULDER SURGERY     • TOTAL SHOULDER ARTHROPLASTY W/ DISTAL CLAVICLE EXCISION Left 10/19/2020    Procedure: TOTAL SHOULDER REVERSE ARTHROPLASTY LEFT;  Surgeon: Tan Miranda MD;  [facility]: [facility];  Service: Orthopedics;  Laterality: Left;       Family History:   Family History   Problem Relation Age of Onset   • Dementia Mother          age 94   • Other Father          age 86 of unknown cause   • Rheum arthritis Father    • Coronary artery disease Brother    • No Known Problems Brother    • Sick sinus syndrome Other         Has pacemaker   • Diabetes Paternal Grandmother        Social History:   Social History     Socioeconomic History   • Marital status:      Spouse name: Not on file   • Number of children: Not on file   • Years of education: Not on file   • Highest education level: Not on file   Occupational History   • Occupation: Businessman   Tobacco Use   • Smoking status: Never Smoker   • Smokeless tobacco: Former User     Types: Chew   Substance and Sexual Activity   • Alcohol use: Yes     Alcohol/week: 1.0 standard drinks     Types: 1 Cans of beer per week     Comment: Occasionally   • Drug use: No   • Sexual activity: Yes     Partners: Female     Comment: Monogamous   Social History Narrative    Domestic life   lives in private home with wife        Taoism   Yarsanism        Sleep hygiene  chronic insomnia  -  intermittent use of Sonata.  2017  occasional Sonata perhaps  weekly.          Caffeine use  2 or 3 cups of coffee daily        Exercise habits   works with a         Diet     low-calorie diabetic diet low in salt and low in sugar.          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[place]        Hearing   minimal hearing impairment        Vision   corrects with lenses        Driving    no limitations       Medications:   Current Outpatient Medications:   •  ascorbic acid (VITAMIN C) 1000 MG tablet, Take 1 capsule by mouth Daily., Disp: , Rfl:   •  atenolol (TENORMIN) 50 MG tablet, TAKE 1 TABLET BY MOUTH DAILY., Disp: 90 tablet, Rfl: 1  •  CALCIUM-MAGNESIUM-ZINC PO, Take 1 tablet by mouth Daily., Disp: , Rfl:   •  Cholecalciferol (VITAMIN D-3) 25 MCG (1000 UT) capsule, Take  by mouth., Disp: , Rfl:   •  Coenzyme Q10 200 MG capsule, Take 200 mg by mouth Daily., Disp: 90 capsule, Rfl: 1  •  docusate sodium (Colace) 100 MG capsule, Take 1 capsule by mouth 2 (Two) Times a Day., Disp: 60 capsule, Rfl: 0  •  Multiple Vitamins-Minerals (CENTRUM ADULTS) tablet, Take 1-2 tablets by mouth daily., Disp: , Rfl:   •  oxybutynin XL (DITROPAN-XL) 5 MG 24 hr tablet, Take 1 tablet by mouth Every 12 (Twelve) Hours. (Patient taking differently: Take 5 mg by mouth every night at bedtime.), Disp: 180 tablet, Rfl: 1  •  rivaroxaban (XARELTO) 20 MG tablet, Take 1 tablet by mouth Daily. Resume 10/22/2020, Disp: 30 tablet, Rfl:   •  Ropivacine HCl-NaCl (NAROPIN), 12 mg/hr by Peripheral Nerve route Continuous., Disp:  , Rfl:   •  sildenafil (VIAGRA) 100 MG tablet, Take 0.5-1 tablets by mouth as needed., Disp: , Rfl:   •  tamsulosin (FLOMAX) 0.4 MG capsule 24 hr capsule, TAKE 1 CAPSULE EVERY 12 HOURS (Patient taking differently: 2 capsules every night at bedtime.), Disp: 180 capsule, Rfl: 1  •  Zinc 50 MG capsule, Take  by mouth., Disp: , Rfl:     Allergies:   Allergies   Allergen Reactions   • Pravastatin Other (See Comments)     Nocturnal leg cramps   • Atorvastatin       Fatigue   • Qsymia [Phentermine-Topiramate] Mental Status Change   • Topiramate Myalgia       The following portions of the patient's history were reviewed and updated as appropriate: allergies, current medications, past family history, past medical history, past social history, past surgical history and problem list.        Objective    Objective      Vital Signs: There were no vitals filed for this visit.    Ortho Exam:  General: comfortable  Vascular: 2+ radial pulse  Neurologic: sensation to light touch is intact distally, elbow flexion/elbow extension/wrist flexion/wrist extension/hand intrinsics intact, able to fire deltoid  Dermatologic: surgical incision is well appearing, with no drainage or surrounding erythema; no signs or symptoms of infection or DVT    Results Review:  Imaging Results (Last 24 Hours)     Procedure Component Value Units Date/Time    XR Shoulder 2+ View Left [[id number]] Resulted: 11/05/20 0948     Updated: 11/05/20 0949    Narrative:      Imaging: shoulder x-rays 3 views - AP, axillary, and scapular-Y x-ray   views    Side: LEFT    Indication for shoulder x-ray 3 views: shoulder pain    Comparison: preoperative imaging    Findings: No acute bony pathology. Implants well appearing and well   positioned after shoulder replacement.  Located and no fracture noted.  Nice biplanar correction with augmented reverse.    I personally reviewed the above x-rays and discussed with the patient.          Procedures          Assessment / Plan      Assessment/Plan:   Problem List Items Addressed This Visit        Other    Status post reverse total shoulder replacement, left - Primary    Relevant Orders    XR Shoulder 2+ View Left (Completed)    Ambulatory Referral to Home Health        1. Physical therapy prescription and physical therapy protocol were given to the patient.  I counseled regarded the importance of participation with physical therapy, sling compliance, and non-weight bearing.    Home  Health PT due to gait difficulties    Sling for 1-2 more weeks    2.  Pain control - better    3. Follow-up in 6 weeks with LEFT shoulder 3 views needed at that time      Follow Up:   6 weeks      Tan Miranda MD, FAAOS  Orthopedic Surgeon  Fellowship Trained Shoulder and Elbow Surgeon  [facility]  Orthopedics and Sports Medicine  [address], [address]  [place] [address]    11/05/20  09:49 EST    Please note that portions of this note may have been completed with a voice recognition program. Efforts were made to edit the dictations, but occasionally words are mistranscribed.

## 2020-12-17 ENCOUNTER — OFFICE VISIT (OUTPATIENT)
Dept: ORTHOPEDIC SURGERY | Facility: CLINIC | Age: 85
End: 2020-12-17

## 2020-12-17 VITALS — OXYGEN SATURATION: 98 % | HEIGHT: 74 IN | HEART RATE: 57 BPM | WEIGHT: 252 LBS | BODY MASS INDEX: 32.34 KG/M2

## 2020-12-17 DIAGNOSIS — Z96.612 STATUS POST REVERSE TOTAL SHOULDER REPLACEMENT, LEFT: Primary | ICD-10-CM

## 2020-12-17 PROCEDURE — 99024 POSTOP FOLLOW-UP VISIT: CPT | Performed by: ORTHOPAEDIC SURGERY

## 2020-12-17 NOTE — PROGRESS NOTES
[facility] [place] Orthopaedic Surgery Office Follow Up       Office Follow Up Visit       Patient Name: Alin Lara    Chief Complaint:   Chief Complaint   Patient presents with   • Post-op     6 week f/u--2 months s/p Total Shoulder Reverse Arthroplasty Left 10/19/20       Referring Physician: No ref. provider found    History of Present Illness:   It has been 6  week(s) since Alin Lara's last visit. Alin Lara returns to clinic today for F/U: postoperative follow-up of leftBody Part: shoulderReason: arthroplasty.  Alin Lara rates HIS/HER: hispain at 1/10 on the pain scale. Previous/current treatments: physical therapy. Current symptoms:Symptoms: same as prior visit.  Overall, he/she: heis doing better. I have reviewed the patient's history of present illness as noted/entered above.    I have reviewed the patient's past medical history, surgical history, social history, family history, medications, and allergies as noted in the electronic medical record and as noted/entered.  I have reviewed the patient's review of systems as noted/enter and updated as noted in the patient's HPI.      2 months s/p complex RSA for severe B3 glenoid left shoulder  Doing much better    Uses cane on right    Home PT/OT -- Nic Webb OT    Mr. Lara is doing very well at this point and has almost no pain and function improving.        Subjective   Subjective      Review of Systems     Past Medical History:   Past Medical History:   Diagnosis Date   • Basal cell carcinoma    • Blood loss     post op   • ED (erectile dysfunction)     Responsive to Viagra   • Failed total knee, right (CMS/HCC)    • Fasting hypoglycemia 2016    Hemoglobin A1c normal   • Generalized osteoarthritis    • GERD (gastroesophageal reflux disease)     History esophageal stricture   • History of blood clots    • HNP (herniated nucleus pulposus), lumbar 1988, 2014    L5 L6   •  Hyperglycemia     not diabetic, pt states hes never has high blood sugar per pt    • Hyperlipidemia    • Hypertension    • Microscopic colitis 2017    Positive biopsy - colonoscopy   • OAB (overactive bladder)     Persistent urgency   • Obesity Adulthood     body weight 244 BMI 32   • Painful total knee replacement (CMS/HCC)    • Paroxysmal atrial fibrillation (CMS/HCC)     one episode - cardioverted   • Prostatism    • Pulmonary embolism (CMS/HCC)     after injury and protracted immobilization   • Right knee DJD     Partial knee arthroplasty   • Shoulder dislocation     Repeated episodes   • Sleep disturbances     Frequently requires medication   • Transient cerebral ischemia ,     Negative medical workup on both occasions   • Venous stasis    • Vertigo    • Wears glasses        Past Surgical History:   Past Surgical History:   Procedure Laterality Date   • CARDIOVERSION  1991    Atrial fibrillation   • COLONOSCOPY      2018   • KNEE ARTHROPLASTY Right 2015    Partial    • KNEE ARTHROPLASTY Left    • KNEE ARTHROSCOPY Right 2015    Failed procedure   • LUMBAR DISC SURGERY  2014    Surgery ×2 Ruptured disc   • LUMBAR DISCECTOMY      L5 L6    • SHOULDER SURGERY     • TOTAL SHOULDER ARTHROPLASTY W/ DISTAL CLAVICLE EXCISION Left 10/19/2020    Procedure: TOTAL SHOULDER REVERSE ARTHROPLASTY LEFT;  Surgeon: Tan Miranda MD;  Location: [facility];  Service: Orthopedics;  Laterality: Left;       Family History:   Family History   Problem Relation Age of Onset   • Dementia Mother          age 94   • Other Father          age 86 of unknown cause   • Rheum arthritis Father    • Coronary artery disease Brother    • No Known Problems Brother    • Sick sinus syndrome Other         Has pacemaker   • Diabetes Paternal Grandmother        Social History:   Social History     Socioeconomic History   • Marital status:      Spouse name: Not on file   • Number of children: Not  on file   • Years of education: Not on file   • Highest education level: Not on file   Occupational History   • Occupation: Businessman   Tobacco Use   • Smoking status: Never Smoker   • Smokeless tobacco: Former User     Types: Chew   Substance and Sexual Activity   • Alcohol use: Yes     Alcohol/week: 1.0 standard drinks     Types: 1 Cans of beer per week     Comment: Occasionally   • Drug use: No   • Sexual activity: Yes     Partners: Female     Comment: Monogamous   Social History Narrative    Domestic life   lives in private home with wife        Jewish   Holiness        Sleep hygiene  chronic insomnia  -  intermittent use of Sonata.  2017  occasional Sonata perhaps weekly.          Caffeine use  2 or 3 cups of coffee daily        Exercise habits   works with a         Diet     low-calorie diabetic diet low in salt and low in sugar.          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[place]        Hearing   minimal hearing impairment        Vision   corrects with lenses        Driving    no limitations       Medications:   Current Outpatient Medications:   •  ascorbic acid (VITAMIN C) 1000 MG tablet, Take 1 capsule by mouth Daily., Disp: , Rfl:   •  atenolol (TENORMIN) 50 MG tablet, TAKE 1 TABLET BY MOUTH DAILY., Disp: 90 tablet, Rfl: 1  •  CALCIUM-MAGNESIUM-ZINC PO, Take 1 tablet by mouth Daily., Disp: , Rfl:   •  Cholecalciferol (VITAMIN D-3) 25 MCG (1000 UT) capsule, Take  by mouth., Disp: , Rfl:   •  Coenzyme Q10 200 MG capsule, Take 200 mg by mouth Daily., Disp: 90 capsule, Rfl: 1  •  docusate sodium (Colace) 100 MG capsule, Take 1 capsule by mouth 2 (Two) Times a Day., Disp: 60 capsule, Rfl: 0  •  Multiple Vitamins-Minerals (CENTRUM ADULTS) tablet, Take 1-2 tablets by mouth daily., Disp: , Rfl:   •  oxybutynin XL (DITROPAN-XL) 5 MG 24 hr tablet, Take 1 tablet by mouth Every 12 (Twelve) Hours. (Patient taking differently: Take 5 mg by mouth every night at bedtime.), Disp: 180  "tablet, Rfl: 1  •  rivaroxaban (XARELTO) 20 MG tablet, Take 1 tablet by mouth Daily. Resume 10/22/2020, Disp: 30 tablet, Rfl:   •  Ropivacine HCl-NaCl (NAROPIN), 12 mg/hr by Peripheral Nerve route Continuous., Disp:  , Rfl:   •  sildenafil (VIAGRA) 100 MG tablet, Take 0.5-1 tablets by mouth as needed., Disp: , Rfl:   •  tamsulosin (FLOMAX) 0.4 MG capsule 24 hr capsule, TAKE 1 CAPSULE EVERY 12 HOURS (Patient taking differently: 2 capsules every night at bedtime.), Disp: 180 capsule, Rfl: 1  •  Zinc 50 MG capsule, Take  by mouth., Disp: , Rfl:     Allergies:   Allergies   Allergen Reactions   • Pravastatin Other (See Comments)     Nocturnal leg cramps   • Atorvastatin      Fatigue   • Qsymia [Phentermine-Topiramate] Mental Status Change   • Topiramate Myalgia       The following portions of the patient's history were reviewed and updated as appropriate: allergies, current medications, past family history, past medical history, past social history, past surgical history and problem list.        Objective    Objective      Vital Signs:   Vitals:    12/17/20 0804   Pulse: 57   SpO2: 98%   Weight: 114 kg (252 lb)   Height: 186.7 cm (73.5\")       Ortho Exam:  LEFT Shoulder  Healed incision  Passive and active range of motion is improved  No acromial pain  Excellent arc of motion, pain-free  Deltoid intact  SLT intact      Results Review:  Imaging Results (Last 24 Hours)     Procedure Component Value Units Date/Time    XR Shoulder 2+ View Left [[id number]] Resulted: 12/17/20 0906     Updated: 12/17/20 0907    Narrative:      Imaging: shoulder x-rays 3 views - AP, axillary, and scapular-Y x-ray   views    Side: LEFT    Indication for shoulder x-ray 3 views: shoulder pain    Comparison: Postop imaging    Findings:   Left reverse total shoulder arthroplasty is in place.  Well located.  No   acute bony findings.  Nice correction with a posterior augment of his   severe B3 glenoid.  Parts appear to be stable when compared to the " prior   postoperative imaging.    I personally reviewed the above x-rays and discussed with the patient.              Procedures          Assessment / Plan      Assessment/Plan:   Problem List Items Addressed This Visit        Other    Status post reverse total shoulder replacement, left - Primary    Relevant Orders    XR Shoulder 2+ View Left (Completed)    Ambulatory Referral to Home Health          NWB LEFT SHOULDER --gradual progression to start typically 3 months out from surgery.  Cautioned about any pushing off of body weight or issues like that.  Primary focus at this point is continue to work on range of motion  OT Rx for home health ordered today    Overall very pleased with his progress and I think he will continue to make significant improvements with occupational therapy.    I will see him back in 2 months with repeat left shoulder 3 views      Follow Up:   2 months      Tan Miranda MD, FAAOS  Orthopedic Surgeon  Fellowship Trained Shoulder and Elbow Surgeon  [facility]  Orthopedics and Sports Medicine  [address] [place] [address], [address]  [place] [address]    12/17/20  09:07 EST    Please note that portions of this note may have been completed with a voice recognition program. Efforts were made to edit the dictations, but occasionally words are mistranscribed.

## 2021-01-12 ENCOUNTER — OFFICE VISIT (OUTPATIENT)
Dept: ORTHOPEDIC SURGERY | Facility: CLINIC | Age: 86
End: 2021-01-12

## 2021-01-12 VITALS — HEART RATE: 80 BPM | HEIGHT: 74 IN | WEIGHT: 251.32 LBS | BODY MASS INDEX: 32.25 KG/M2 | OXYGEN SATURATION: 97 %

## 2021-01-12 DIAGNOSIS — I89.0 LYMPHEDEMA OF LEFT LOWER EXTREMITY: ICD-10-CM

## 2021-01-12 DIAGNOSIS — T84.84XD PAIN DUE TO TOTAL LEFT KNEE REPLACEMENT, SUBSEQUENT ENCOUNTER: Primary | ICD-10-CM

## 2021-01-12 DIAGNOSIS — Z96.652 PAIN DUE TO TOTAL LEFT KNEE REPLACEMENT, SUBSEQUENT ENCOUNTER: Primary | ICD-10-CM

## 2021-01-12 PROCEDURE — 99212 OFFICE O/P EST SF 10 MIN: CPT | Performed by: ORTHOPAEDIC SURGERY

## 2021-01-12 NOTE — PROGRESS NOTES
Orthopaedic Clinic Note: Knee Established Patient    Chief Complaint   Patient presents with   • Follow-up     6 months- Pain due to total left knee replacement        HPI    It has been 6  month(s) since Mr. Lara's last visit. He returns to clinic today for follow-up to left total knee arthroplasty.  He returns to clinic today continued complaint of pain and stiffness in the left knee that rates a 3/10 on the pain scale.  In summary he had a total knee arthroplasty performed Dr. Jacob Bermudez at the [facility].  He subsequently developed swelling in the leg was diagnosed with a chronic DVT.  He is currently on Xarelto for this.  He has lymphedema in the extremity and I sent him to see vascular surgeon for discussion of treatment options.  Vascular surgeon essentially diagnosed him with lymphedema and instructed him on compression socks.  He comes to clinic today continued complaint of stiffness in the knee.  He is ambulating with assistance of a cane.  He has also had an interval shoulder arthroplasty performed by Dr. Miranda.  He is doing well from this.  He is here to request treatment option for his ongoing left knee issues.    Past Medical History:   Diagnosis Date   • Basal cell carcinoma    • Blood loss     post op   • ED (erectile dysfunction)     Responsive to Viagra   • Failed total knee, right (CMS/HCC)    • Fasting hypoglycemia 2016    Hemoglobin A1c normal   • Generalized osteoarthritis    • GERD (gastroesophageal reflux disease)     History esophageal stricture   • History of blood clots    • HNP (herniated nucleus pulposus), lumbar 1988, 2014    L5 L6   • Hyperglycemia     not diabetic, pt states hes never has high blood sugar per pt    • Hyperlipidemia    • Hypertension    • Microscopic colitis 2017    Positive biopsy - colonoscopy   • OAB (overactive bladder) 2000    Persistent urgency   • Obesity Adulthood    2012 body weight 244 BMI 32   • Painful total knee replacement (CMS/HCC)    •  Paroxysmal atrial fibrillation (CMS/HCC)     one episode - cardioverted   • Prostatism    • Pulmonary embolism (CMS/HCC)     after injury and protracted immobilization   • Right knee DJD     Partial knee arthroplasty   • Shoulder dislocation     Repeated episodes   • Sleep disturbances     Frequently requires medication   • Transient cerebral ischemia ,     Negative medical workup on both occasions   • Venous stasis    • Vertigo    • Wears glasses       Past Surgical History:   Procedure Laterality Date   • CARDIOVERSION      Atrial fibrillation   • COLONOSCOPY      2018   • KNEE ARTHROPLASTY Right 2015    Partial    • KNEE ARTHROPLASTY Left    • KNEE ARTHROSCOPY Right 2015    Failed procedure   • LUMBAR DISC SURGERY  2014    Surgery ×2 Ruptured disc   • LUMBAR DISCECTOMY      L5 L6    • SHOULDER SURGERY     • TOTAL SHOULDER ARTHROPLASTY W/ DISTAL CLAVICLE EXCISION Left 10/19/2020    Procedure: TOTAL SHOULDER REVERSE ARTHROPLASTY LEFT;  Surgeon: Tan Miranda MD;  Location: [facility];  Service: Orthopedics;  Laterality: Left;      Family History   Problem Relation Age of Onset   • Dementia Mother          age 94   • Other Father          age 86 of unknown cause   • Rheum arthritis Father    • Coronary artery disease Brother    • No Known Problems Brother    • Sick sinus syndrome Other         Has pacemaker   • Diabetes Paternal Grandmother      Social History     Socioeconomic History   • Marital status:      Spouse name: Not on file   • Number of children: Not on file   • Years of education: Not on file   • Highest education level: Not on file   Occupational History   • Occupation: Businessman   Tobacco Use   • Smoking status: Never Smoker   • Smokeless tobacco: Former User     Types: Chew   Substance and Sexual Activity   • Alcohol use: Yes     Alcohol/week: 1.0 standard drinks     Types: 1 Cans of beer per week     Comment: Occasionally   • Drug use: No    • Sexual activity: Yes     Partners: Female     Comment: Monogamous   Social History Narrative    Domestic life   lives in private home with wife        Nondenominational   [employer]        Sleep hygiene  chronic insomnia  -  intermittent use of Sonata.  2017  occasional Sonata perhaps weekly.          Caffeine use  2 or 3 cups of coffee daily        Exercise habits   works with a         Diet     low-calorie diabetic diet low in salt and low in sugar.          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     • atenolol (TENORMIN) 50 MG tablet TAKE 1 TABLET BY MOUTH DAILY. 90 tablet 1   • CALCIUM-MAGNESIUM-ZINC PO Take 1 tablet by mouth Daily.     • Cholecalciferol (VITAMIN D-3) 25 MCG (1000 UT) capsule Take  by mouth.     • Coenzyme Q10 200 MG capsule Take 200 mg by mouth Daily. 90 capsule 1   • docusate sodium (Colace) 100 MG capsule Take 1 capsule by mouth 2 (Two) Times a Day. 60 capsule 0   • Multiple Vitamins-Minerals (CENTRUM ADULTS) tablet Take 1-2 tablets by mouth daily.     • oxybutynin XL (DITROPAN-XL) 5 MG 24 hr tablet Take 1 tablet by mouth Every 12 (Twelve) Hours. (Patient taking differently: Take 5 mg by mouth every night at bedtime.) 180 tablet 1   • rivaroxaban (XARELTO) 20 MG tablet Take 1 tablet by mouth Daily. Resume 10/22/2020 30 tablet    • Ropivacine HCl-NaCl (NAROPIN) 12 mg/hr by Peripheral Nerve route Continuous.     • sildenafil (VIAGRA) 100 MG tablet Take 0.5-1 tablets by mouth as needed.     • tamsulosin (FLOMAX) 0.4 MG capsule 24 hr capsule TAKE 1 CAPSULE EVERY 12 HOURS (Patient taking differently: 2 capsules every night at bedtime.) 180 capsule 1   • Zinc 50 MG capsule Take  by mouth.    "    No current facility-administered medications on file prior to visit.       Allergies   Allergen Reactions   • Pravastatin Other (See Comments)     Nocturnal leg cramps   • Atorvastatin      Fatigue   • Qsymia [Phentermine-Topiramate] Mental Status Change   • Topiramate Myalgia        Review of Systems   Constitutional: Negative.    HENT: Negative.    Eyes: Negative.    Respiratory: Negative.    Cardiovascular: Negative.    Gastrointestinal: Negative.    Endocrine: Negative.    Genitourinary: Negative.    Musculoskeletal: Positive for arthralgias.   Skin: Negative.    Allergic/Immunologic: Negative.    Neurological: Negative.    Hematological: Negative.    Psychiatric/Behavioral: Negative.         The patient's Review of Systems was personally reviewed and confirmed as accurate.    Physical Exam  Pulse 80, height 186.7 cm (73.5\"), weight 114 kg (251 lb 5.2 oz), SpO2 97 %.    Body mass index is 32.71 kg/m².    GENERAL APPEARANCE: awake, alert, oriented, in no acute distress and well developed, well nourished  LUNGS:  breathing nonlabored  EXTREMITIES: no clubbing, cyanosis  PERIPHERAL PULSES: palpable dorsalis pedis and posterior tibial pulses bilaterally.    GAIT:  Antalgic        ----------  Left Knee Exam:  ----------  ALIGNMENT: neutral  ----------  RANGE OF MOTION:  Decreased (5 - 120 degrees) with no extensor lag  LIGAMENTOUS STABILITY:   stable to varus and valgus stress at terminal extension and 30 degrees without any evidence of laxity  ----------  STRENGTH:  KNEE FLEXION 5/5  KNEE EXTENSION  5/5  ANKLE DORSIFLEXION  5/5  ANKLE PLANTARFLEXION  5/5  ----------  PAIN WITH PALPATION:  Trace tenderness about the knee.    KNEE EFFUSION:   Minimal effusion  PAIN WITH KNEE ROM: yes  PATELLAR CREPITUS:  no  ----------  SENSATION TO LIGHT TOUCH:  DEEP PERONEAL/SUPERFICIAL PERONEAL/SURAL/SAPHENOUS/TIBIAL:    intact  ----------  EDEMA:   Lymphedema from ankle extending to knee " proximally  ERYTHEMA:    no  WOUNDS/INCISIONS:   yes, well healed surgical incision without evidence of erythema or drainage                  _____________________________________________________________________  _____________________________________________________________________    RADIOGRAPHIC FINDINGS:   Indication: Left knee pain status post total knee arthroplasty    Comparison: Todays xrays were compared to previous xrays from 7/17/2020    Knee films: Demonstrate well positioned knee arthroplasty components in satisfactory alignment without evidence of wear, loosening, subsidence, fracture, or osteolysis and No significant changes compared to prior radiographs.    Assessment/Plan:   Diagnosis Plan   1. Pain due to total left knee replacement, subsequent encounter  XR Knee 3+ View With Sunrise Left   2. Lymphedema of left lower extremity       I discussed with the patient that his ongoing knee pain is unlikely to be improved with a revision surgery.  I see no clinical radiographic evidence of complication with the total joint arthroplasty.  He has no notable knee effusion today.  Prior work-up for infection included essentially normal sed rate and CRP.  I explained that I believe the majority of his discomfort is due to the residual lymphedema.  Additional surgery will likely exacerbate this lymphedema and could potentially make his problem worse.  As a result, do not recommend any further intervention.  I recommended treatment for the lymphedema.  He is agreeable this plan.  He will follow-up as needed.      Zaheer Macedo MD  01/12/21  16:23 EST

## 2021-01-28 ENCOUNTER — OFFICE VISIT (OUTPATIENT)
Dept: CARDIAC SURGERY | Facility: CLINIC | Age: 86
End: 2021-01-28

## 2021-01-28 VITALS
DIASTOLIC BLOOD PRESSURE: 68 MMHG | BODY MASS INDEX: 33.4 KG/M2 | HEART RATE: 54 BPM | HEIGHT: 73 IN | SYSTOLIC BLOOD PRESSURE: 121 MMHG | TEMPERATURE: 96.6 F | OXYGEN SATURATION: 98 % | WEIGHT: 252 LBS

## 2021-01-28 DIAGNOSIS — I48.0 PAROXYSMAL ATRIAL FIBRILLATION (HCC): ICD-10-CM

## 2021-01-28 DIAGNOSIS — I87.2 VENOUS INSUFFICIENCY OF LEFT LEG: Primary | ICD-10-CM

## 2021-01-28 PROCEDURE — 99203 OFFICE O/P NEW LOW 30 MIN: CPT | Performed by: THORACIC SURGERY (CARDIOTHORACIC VASCULAR SURGERY)

## 2021-01-28 NOTE — PROGRESS NOTES
01/28/2021  Patient Information  Alin Lara                                                                                          [address]  [facility] [address]   1935  'PCP/Referring Physician'  Ravi Neville MD  [phone]  No ref. provider found    Chief Complaint   Patient presents with   • Consult     NP for venous disease. LT leg pain/redness/swelling.      CC: I have got vein problems in this left leg    History of Present Illness: 85-year-old  male being seen in consultation for evaluation and treatment of chronic venous insufficiency of the left lower extremity.  Over a year ago the patient underwent hemiknee replacement on the right.  Postoperatively he did well and then subsequently had a total knee replacement on the left.  Following the total knee replacement the patient noted swelling in his calf and pain in the knee and in the calf.  The patient states that the swelling has persisted as has the discomfort.  If he walks or stands on his feet for any extended period of time he gets significant swelling in the left lower extremity along with chronic discomfort.  He has been on Xarelto.  He has had a recent venous duplex study done at the [facility] facility.  Unfortunately I do not have access to that study at the time of this dictation.  The patient has never had ulceration of the left lower extremity.  He also still has pain in his left knee.  He feels that some of this may be due to the swelling in his left leg inhibiting full rehabilitation efforts.  He was recently fitted for Jobst stockings but states that when he put the stockings on they were so tight that they caused significant pain in the lower extremity and acted as a tourniquet at the below-knee level.  He has not had any surgical intervention on the left lower extremity other than the total knee replacement.  There is no history of peripheral vascular disease.  Prior to his knee surgical  procedures he did not have claudication.  He has multiple comorbidities as noted in the patient's active problem list.      Patient Active Problem List   Diagnosis   • Paroxysmal atrial fibrillation (CMS/HCC)   • Basal cell carcinoma of skin   • Impotence of organic origin   • Gastroesophageal reflux disease   • Hyperlipidemia   • Hypertension   • Persistent insomnia   • Knee pain   • Osteoarthritis of lumbar spine   • Adiposity   • Overactive bladder   • Arthralgia of hip   • Impaired glucose tolerance   • Prostatism   • History of pulmonary embolism (CMS/HCC)   • Inflammation of sacroiliac joint (CMS/HCC)   • Generalized osteoarthritis   • Preventative health care   • Vertigo   • TIA (transient ischemic attack)   • Lymphocytic colitis   • Esophageal stricture   • Left shoulder pain   • Primary localized osteoarthrosis of left shoulder region   • Contracture of joint of left shoulder region   • Biceps tendinitis of left upper extremity   • Status post reverse total shoulder replacement, left   • Leukocytosis, likely reactive   • Acute postoperative pain     Past Medical History:   Diagnosis Date   • Basal cell carcinoma    • Blood loss     post op   • ED (erectile dysfunction)     Responsive to Viagra   • Failed total knee, right (CMS/HCC)    • Fasting hypoglycemia 2016    Hemoglobin A1c normal   • Generalized osteoarthritis    • GERD (gastroesophageal reflux disease)     History esophageal stricture   • History of blood clots    • HNP (herniated nucleus pulposus), lumbar 1988, 2014    L5 L6   • Hyperglycemia     not diabetic, pt states hes never has high blood sugar per pt    • Hyperlipidemia    • Hypertension    • Microscopic colitis 2017    Positive biopsy - colonoscopy   • OAB (overactive bladder) 2000    Persistent urgency   • Obesity Adulthood    2012 body weight 244 BMI 32   • Painful total knee replacement (CMS/HCC)    • Paroxysmal atrial fibrillation (CMS/HCC) 1991    one episode - cardioverted   •  Prostatism 2014   • Pulmonary embolism (CMS/HCC) 1991    after injury and protracted immobilization   • Right knee DJD 2015    Partial knee arthroplasty   • Shoulder dislocation     Repeated episodes   • Sleep disturbances 1990    Frequently requires medication   • Transient cerebral ischemia 2010, 2016    Negative medical workup on both occasions   • Venous stasis    • Vertigo    • Wears glasses      Past Surgical History:   Procedure Laterality Date   • CARDIOVERSION  1991    Atrial fibrillation   • COLONOSCOPY      2018   • KNEE ARTHROPLASTY Right 2015    Partial    • KNEE ARTHROPLASTY Left    • KNEE ARTHROSCOPY Right 2015    Failed procedure   • LUMBAR DISC SURGERY  2014    Surgery ×2 Ruptured disc   • LUMBAR DISCECTOMY  1988    L5 L6    • SHOULDER SURGERY     • TOTAL SHOULDER ARTHROPLASTY W/ DISTAL CLAVICLE EXCISION Left 10/19/2020    Procedure: TOTAL SHOULDER REVERSE ARTHROPLASTY LEFT;  Surgeon: Tan Miranda MD;  Location: [facility];  Service: Orthopedics;  Laterality: Left;       Current Outpatient Medications:   •  ascorbic acid (VITAMIN C) 1000 MG tablet, Take 1 capsule by mouth Daily., Disp: , Rfl:   •  atenolol (TENORMIN) 50 MG tablet, TAKE 1 TABLET BY MOUTH DAILY., Disp: 90 tablet, Rfl: 1  •  CALCIUM-MAGNESIUM-ZINC PO, Take 1 tablet by mouth Daily., Disp: , Rfl:   •  Cholecalciferol (VITAMIN D-3) 25 MCG (1000 UT) capsule, Take  by mouth., Disp: , Rfl:   •  Coenzyme Q10 200 MG capsule, Take 200 mg by mouth Daily., Disp: 90 capsule, Rfl: 1  •  docusate sodium (Colace) 100 MG capsule, Take 1 capsule by mouth 2 (Two) Times a Day., Disp: 60 capsule, Rfl: 0  •  Multiple Vitamins-Minerals (CENTRUM ADULTS) tablet, Take 1-2 tablets by mouth daily., Disp: , Rfl:   •  oxybutynin XL (DITROPAN-XL) 5 MG 24 hr tablet, Take 1 tablet by mouth Every 12 (Twelve) Hours. (Patient taking differently: Take 5 mg by mouth every night at bedtime.), Disp: 180 tablet, Rfl: 1  •  rivaroxaban (XARELTO) 20 MG tablet, Take 1  tablet by mouth Daily. Resume 10/22/2020, Disp: 30 tablet, Rfl:   •  Ropivacine HCl-NaCl (NAROPIN), 12 mg/hr by Peripheral Nerve route Continuous., Disp:  , Rfl:   •  sildenafil (VIAGRA) 100 MG tablet, Take 0.5-1 tablets by mouth as needed., Disp: , Rfl:   •  tamsulosin (FLOMAX) 0.4 MG capsule 24 hr capsule, TAKE 1 CAPSULE EVERY 12 HOURS (Patient taking differently: 2 capsules every night at bedtime.), Disp: 180 capsule, Rfl: 1  •  Zinc 50 MG capsule, Take  by mouth., Disp: , Rfl:   Allergies   Allergen Reactions   • Pravastatin Other (See Comments)     Nocturnal leg cramps   • Atorvastatin      Fatigue   • Qsymia [Phentermine-Topiramate] Mental Status Change   • Topiramate Myalgia     Social History     Socioeconomic History   • Marital status:      Spouse name: Not on file   • Number of children: 2   • Years of education: Not on file   • Highest education level: Not on file   Occupational History   • Occupation: Businessman     Comment: Retired   Tobacco Use   • Smoking status: Never Smoker   • Smokeless tobacco: Former User     Types: Chew   Substance and Sexual Activity   • Alcohol use: Yes     Alcohol/week: 1.0 standard drinks     Types: 1 Cans of beer per week     Comment: Occasionally   • Drug use: No   • Sexual activity: Yes     Partners: Female     Comment: Monogamous   Social History Narrative    Domestic life   lives in private home with wife        [place]   [place]        Sleep hygiene  chronic insomnia  -  intermittent use of Sonata.  2017  occasional Sonata perhaps weekly.          Caffeine use  2 or 3 cups of coffee daily        Exercise habits   works with a         Diet     low-calorie diabetic diet low in salt and low in sugar.          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      Family History  "  Problem Relation Age of Onset   • Dementia Mother          age 94   • Other Father          age 86 of unknown cause   • Rheum arthritis Father    • Coronary artery disease Brother    • No Known Problems Brother    • Sick sinus syndrome Other         Has pacemaker   • Diabetes Paternal Grandmother      Review of Systems   Constitution: Negative for chills, fever, malaise/fatigue, night sweats and weight loss.   HENT: Negative for hearing loss, odynophagia and sore throat.    Cardiovascular: Positive for leg swelling ( LT leg). Negative for chest pain, dyspnea on exertion, orthopnea and palpitations.   Respiratory: Negative for cough and shortness of breath.    Hematologic/Lymphatic: Does not bruise/bleed easily.   Skin: Negative for itching and rash.   Musculoskeletal: Positive for arthritis. Negative for joint pain, joint swelling and myalgias.   Gastrointestinal: Negative for abdominal pain, constipation, diarrhea, hematemesis, hematochezia, nausea and vomiting.   Genitourinary: Negative for dysuria, frequency and hematuria.   Neurological: Negative for focal weakness, headaches, numbness and seizures.   Psychiatric/Behavioral: Negative for suicidal ideas.     Vitals:    21 1213   BP: 121/68   BP Location: Left arm   Pulse: 54   Temp: 96.6 °F (35.9 °C)   SpO2: 98%   Weight: 114 kg (252 lb)   Height: 185.4 cm (73\")      Physical Exam  Vitals signs and nursing note reviewed.   Constitutional:       General: He is not in acute distress.     Appearance: Normal appearance. He is obese. He is not ill-appearing or toxic-appearing.   HENT:      Head: Normocephalic and atraumatic.      Right Ear: External ear normal.      Left Ear: External ear normal.      Nose: Nose normal.      Mouth/Throat:      Mouth: Mucous membranes are moist.   Eyes:      Extraocular Movements: Extraocular movements intact.      Pupils: Pupils are equal, round, and reactive to light.   Neck:      Musculoskeletal: Normal range of " motion. No neck rigidity or muscular tenderness.      Vascular: No carotid bruit.   Cardiovascular:      Rate and Rhythm: Normal rate and regular rhythm.      Pulses: Normal pulses.      Heart sounds: Normal heart sounds. No murmur.   Pulmonary:      Effort: Pulmonary effort is normal. No respiratory distress.      Breath sounds: No wheezing, rhonchi or rales.   Abdominal:      General: Abdomen is flat. Bowel sounds are normal.      Palpations: Abdomen is soft. There is no mass.      Tenderness: There is no abdominal tenderness. There is no guarding.   Musculoskeletal:         General: Swelling and tenderness present.      Right lower leg: No edema.      Left lower leg: Edema present.      Comments: Edema of the left lower extremity particularly pronounced below the knee.  A small area of induration on the medial aspect of the left lower extremity (probably over a ).  Pedal pulses are present hair and skin of the toes is normal.  Normal motor and sensory function of both lower extremities.  No varicosities.  Moderate numbers of telangiectasia.   Lymphadenopathy:      Cervical: No cervical adenopathy.   Skin:     General: Skin is warm and dry.      Capillary Refill: Capillary refill takes less than 2 seconds.      Coloration: Skin is not jaundiced.      Findings: No erythema.   Neurological:      General: No focal deficit present.      Mental Status: He is alert and oriented to person, place, and time. Mental status is at baseline.   Psychiatric:         Mood and Affect: Mood normal.         Behavior: Behavior normal.         Thought Content: Thought content normal.         Judgment: Judgment normal.         The ROS, past medical history, surgical history, family history, social history and vitals were reviewed by myself and corrected as needed.      Labs/Imaging: Previous x-rays of the knees, left shoulder, and CTA of the neck reviewed.  Unfortunately venous duplex studies not available at the time of this  dictation but have been requested.    Assessment/Plan: Chronic venous insufficiency of the left lower extremity (moderate).  This patient should be successfully manageable utilizing appropriate compression stockings.  I have written a prescription for him to have the stockings fitted and ordered by Mendez Liriano.  Once the patient has received the stockings he will be seen back in this office for follow-up and further evaluation.  In the interim I will review the venous duplex studies of the left lower extremity.  If there is no improvement in the patient's symptoms we will consider referral to a vein surgery specialty clinic.    Patient Active Problem List   Diagnosis   • Paroxysmal atrial fibrillation (CMS/HCC)   • Basal cell carcinoma of skin   • Impotence of organic origin   • Gastroesophageal reflux disease   • Hyperlipidemia   • Hypertension   • Persistent insomnia   • Knee pain   • Osteoarthritis of lumbar spine   • Adiposity   • Overactive bladder   • Arthralgia of hip   • Impaired glucose tolerance   • Prostatism   • History of pulmonary embolism (CMS/HCC)   • Inflammation of sacroiliac joint (CMS/HCC)   • Generalized osteoarthritis   • Preventative health care   • Vertigo   • TIA (transient ischemic attack)   • Lymphocytic colitis   • Esophageal stricture   • Left shoulder pain   • Primary localized osteoarthrosis of left shoulder region   • Contracture of joint of left shoulder region   • Biceps tendinitis of left upper extremity   • Status post reverse total shoulder replacement, left   • Leukocytosis, likely reactive   • Acute postoperative pain         Baljinder Zamora MD  [facility]  01/28/21   14:38 EST

## 2021-02-18 ENCOUNTER — OFFICE VISIT (OUTPATIENT)
Dept: ORTHOPEDIC SURGERY | Facility: CLINIC | Age: 86
End: 2021-02-18

## 2021-02-18 VITALS — HEART RATE: 63 BPM | HEIGHT: 73 IN | OXYGEN SATURATION: 99 % | WEIGHT: 251.32 LBS | BODY MASS INDEX: 33.31 KG/M2

## 2021-02-18 DIAGNOSIS — Z96.612 STATUS POST REVERSE TOTAL SHOULDER REPLACEMENT, LEFT: Primary | ICD-10-CM

## 2021-02-18 PROCEDURE — 99212 OFFICE O/P EST SF 10 MIN: CPT | Performed by: ORTHOPAEDIC SURGERY

## 2021-02-18 NOTE — PROGRESS NOTES
[facility] Orthopaedic Surgery Office Follow Up       Office Follow Up Visit       Patient Name: Alin Lara    Chief Complaint:   Chief Complaint   Patient presents with   • Follow-up     2 month follow up - 4 months s/p Total Shoulder Reverse Arthroplasty Left 10/19/20       Referring Physician: No ref. provider found    History of Present Illness:   It has been 2  month(s) since Alin Lara's last visit. Alin Lara returns to clinic today for F/U: postoperative follow-up of leftBody Part: shoulderReason: arthroplasty. Alin Lara rates HIS/HER: hispain at 2/10 on the pain scale. Previous/current treatments: cane/walker and physical therapy. Current symptoms:Symptoms: stiffness. Overall, he/she: heis doing better.  I have reviewed the patient's history of present illness as noted/entered above.    I have reviewed the patient's past medical history, surgical history, social history, family history, medications, and allergies as noted in the electronic medical record and as noted/entered.  I have reviewed the patient's review of systems as noted/enter and updated as noted in the patient's HPI.    Date of surgery 10/19/2020 left complex reverse shoulder replacement with augmented glenoid component for severe B3 glenoid    Home health physical therapy  4 months postop    Saw Dr. Zamora for left venous insufficiency    Enjoyed HH PT with Adrienne and did very well, his is interested in Outpatient PT at [facility]        Subjective   Subjective      Review of Systems   Constitutional: Negative.  Negative for chills, fatigue and fever.   HENT: Negative.  Negative for congestion and dental problem.    Eyes: Negative.  Negative for blurred vision.   Respiratory: Negative.  Negative for shortness of breath.    Cardiovascular: Negative.  Negative for leg swelling.   Gastrointestinal: Negative.  Negative for abdominal pain.   Endocrine: Negative.   Negative for polyuria.   Genitourinary: Negative.  Negative for difficulty urinating.   Musculoskeletal: Positive for arthralgias.   Skin: Negative.    Allergic/Immunologic: Negative.    Neurological: Negative.    Hematological: Negative.  Negative for adenopathy.   Psychiatric/Behavioral: Negative.  Negative for behavioral problems.        Past Medical History:   Past Medical History:   Diagnosis Date   • Basal cell carcinoma    • Blood loss     post op   • ED (erectile dysfunction)     Responsive to Viagra   • Failed total knee, right (CMS/HCC)    • Fasting hypoglycemia 2016    Hemoglobin A1c normal   • Generalized osteoarthritis    • GERD (gastroesophageal reflux disease)     History esophageal stricture   • History of blood clots    • HNP (herniated nucleus pulposus), lumbar 1988, 2014    L5 L6   • Hyperglycemia     not diabetic, pt states hes never has high blood sugar per pt    • Hyperlipidemia    • Hypertension    • Microscopic colitis 2017    Positive biopsy - colonoscopy   • OAB (overactive bladder) 2000    Persistent urgency   • Obesity Adulthood    2012 body weight 244 BMI 32   • Painful total knee replacement (CMS/[facility])    • Paroxysmal atrial fibrillation (CMS/[facility]) 1991    one episode - cardioverted   • Prostatism 2014   • Pulmonary embolism (CMS/[facility]) 1991    after injury and protracted immobilization   • Right knee DJD 2015    Partial knee arthroplasty   • Shoulder dislocation     Repeated episodes   • Sleep disturbances 1990    Frequently requires medication   • Transient cerebral ischemia 2010, 2016    Negative medical workup on both occasions   • Venous stasis    • Vertigo    • Wears glasses        Past Surgical History:   Past Surgical History:   Procedure Laterality Date   • CARDIOVERSION  1991    Atrial fibrillation   • COLONOSCOPY      2018   • KNEE ARTHROPLASTY Right 2015    Partial    • KNEE ARTHROPLASTY Left    • KNEE ARTHROSCOPY Right 2015    Failed procedure   • LUMBAR DISC SURGERY  2014     Surgery ×2 Ruptured disc   • LUMBAR DISCECTOMY      L5 L6    • SHOULDER SURGERY     • TOTAL SHOULDER ARTHROPLASTY W/ DISTAL CLAVICLE EXCISION Left 10/19/2020    Procedure: TOTAL SHOULDER REVERSE ARTHROPLASTY LEFT;  Surgeon: Tan Miranda MD;  Location: [facility];  Service: Orthopedics;  Laterality: Left;       Family History:   Family History   Problem Relation Age of Onset   • Dementia Mother          age 94   • Other Father          age 86 of unknown cause   • Rheum arthritis Father    • Coronary artery disease Brother    • No Known Problems Brother    • Sick sinus syndrome Other         Has pacemaker   • Diabetes Paternal Grandmother        Social History:   Social History     Socioeconomic History   • Marital status:      Spouse name: Not on file   • Number of children: 2   • Years of education: Not on file   • Highest education level: Not on file   Occupational History   • Occupation: Businessman     Comment: Retired   Tobacco Use   • Smoking status: Never Smoker   • Smokeless tobacco: Former User     Types: Chew   Substance and Sexual Activity   • Alcohol use: Yes     Alcohol/week: 1.0 standard drinks     Types: 1 Cans of beer per week     Comment: Occasionally   • Drug use: No   • Sexual activity: Yes     Partners: Female     Comment: Monogamous   Social History Narrative    Domestic life   lives in private home with wife        Lutheran   Rastafarian        Sleep hygiene  chronic insomnia  -  intermittent use of Sonata.  2017  occasional Sonata perhaps weekly.          Caffeine use  2 or 3 cups of coffee daily        Exercise habits   works with a         Diet     low-calorie diabetic diet low in salt and low in sugar.          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[place]        Hearing   minimal hearing impairment        Vision   corrects with lenses        Driving    no limitations        Pt lives in [place], [place] with wife.        Medications:    Current Outpatient Medications:   •  ascorbic acid (VITAMIN C) 1000 MG tablet, Take 1 capsule by mouth Daily., Disp: , Rfl:   •  atenolol (TENORMIN) 50 MG tablet, TAKE 1 TABLET BY MOUTH DAILY., Disp: 90 tablet, Rfl: 1  •  CALCIUM-MAGNESIUM-ZINC PO, Take 1 tablet by mouth Daily., Disp: , Rfl:   •  Cholecalciferol (VITAMIN D-3) 25 MCG (1000 UT) capsule, Take  by mouth., Disp: , Rfl:   •  Coenzyme Q10 200 MG capsule, Take 200 mg by mouth Daily., Disp: 90 capsule, Rfl: 1  •  docusate sodium (Colace) 100 MG capsule, Take 1 capsule by mouth 2 (Two) Times a Day., Disp: 60 capsule, Rfl: 0  •  Multiple Vitamins-Minerals (CENTRUM ADULTS) tablet, Take 1-2 tablets by mouth daily., Disp: , Rfl:   •  oxybutynin XL (DITROPAN-XL) 5 MG 24 hr tablet, Take 1 tablet by mouth Every 12 (Twelve) Hours. (Patient taking differently: Take 5 mg by mouth every night at bedtime.), Disp: 180 tablet, Rfl: 1  •  rivaroxaban (XARELTO) 20 MG tablet, Take 1 tablet by mouth Daily. Resume 10/22/2020, Disp: 30 tablet, Rfl:   •  Ropivacine HCl-NaCl (NAROPIN), 12 mg/hr by Peripheral Nerve route Continuous., Disp:  , Rfl:   •  sildenafil (VIAGRA) 100 MG tablet, Take 0.5-1 tablets by mouth as needed., Disp: , Rfl:   •  tamsulosin (FLOMAX) 0.4 MG capsule 24 hr capsule, TAKE 1 CAPSULE EVERY 12 HOURS (Patient taking differently: 2 capsules every night at bedtime.), Disp: 180 capsule, Rfl: 1  •  Zinc 50 MG capsule, Take  by mouth., Disp: , Rfl:     Allergies:   Allergies   Allergen Reactions   • Pravastatin Other (See Comments)     Nocturnal leg cramps   • Atorvastatin      Fatigue   • Qsymia [Phentermine-Topiramate] Mental Status Change   • Topiramate Myalgia       The following portions of the patient's history were reviewed and updated as appropriate: allergies, current medications, past family history, past medical history, past social history, past surgical history and problem list.        Objective    Objective      Vital Signs:   Vitals:    02/18/21  "1440   Pulse: 63   SpO2: 99%   Weight: 114 kg (251 lb 5.2 oz)   Height: 185.4 cm (72.99\")       Ortho Exam:  LEFT shoulder doing well    AROM excellent and pain free, excellent at this point.    No pain on exam, 5-5 deltoid    Results Review:  Imaging Results (Last 24 Hours)     Procedure Component Value Units Date/Time    XR Shoulder 2+ View Left [[id number]] Resulted: 02/18/21 1448     Updated: 02/18/21 1449    Narrative:      Imaging: shoulder x-rays 3 views - AP, axillary, and scapular-Y x-ray   views    Side: LEFT SHOULDER    Indication for shoulder x-ray 3 views: shoulder pain    Comparison: postoperative imaging 12/17/2020    Findings: No acute bony pathology. Located and no fracture noted.    Implants appear unchanged compared to the prior images on 12/17/2020,   well-appearing.    I personally reviewed the above x-rays and discussed with the patient.            Procedures          Assessment / Plan      Assessment/Plan:   Problem List Items Addressed This Visit        Musculoskeletal and Injuries    Status post reverse total shoulder replacement, left - Primary    Relevant Orders    XR Shoulder 2+ View Left (Completed)    Ambulatory Referral to Physical Therapy Ortho, Evaluate and treat, POST OP        Physical therapy-[facility] -order placed; PT will reach out to him    Postop reverse protocol gradually progress with weightbearing now 4 months out        Follow Up:   Follow-up 3 months with a left shoulder 3 views next visit    Tan Miranda MD, FAAOS  Orthopedic Surgeon  Fellowship Trained Shoulder and Elbow Surgeon  [facility]  Orthopedics and Sports Medicine  [address] [place] [address], [address]  [place] [address]    02/18/21  15:20 EST    Please note that portions of this note may have been completed with a voice recognition program. Efforts were made to edit the dictations, but occasionally words are mistranscribed.   "

## 2021-02-24 ENCOUNTER — TREATMENT (OUTPATIENT)
Dept: PHYSICAL THERAPY | Facility: CLINIC | Age: 86
End: 2021-02-24

## 2021-02-24 DIAGNOSIS — R53.1 WEAKNESS: ICD-10-CM

## 2021-02-24 DIAGNOSIS — M25.512 CHRONIC LEFT SHOULDER PAIN: Primary | ICD-10-CM

## 2021-02-24 DIAGNOSIS — M25.60 RANGE OF MOTION DEFICIT: ICD-10-CM

## 2021-02-24 DIAGNOSIS — G89.29 CHRONIC LEFT SHOULDER PAIN: Primary | ICD-10-CM

## 2021-02-24 PROCEDURE — 97110 THERAPEUTIC EXERCISES: CPT | Performed by: PHYSICAL THERAPIST

## 2021-02-24 PROCEDURE — 97162 PT EVAL MOD COMPLEX 30 MIN: CPT | Performed by: PHYSICAL THERAPIST

## 2021-02-24 NOTE — PROGRESS NOTES
Physical Therapy Initial Evaluation and Plan of Care    Patient: Alin Lara   : 1935  Diagnosis/ICD-10 Code:  No primary diagnosis found.  Referring practitioner: Tan Miranda MD  Date of Initial Visit: 2021  Today's Date: 2021  Patient seen for Visit count could not be calculated. Make sure you are using a visit which is associated with an episode. sessions           Subjective Questionnaire: QuickDASH: 23      Subjective Evaluation    History of Present Illness  Mechanism of injury: The pt had a L rTSA on 10/19/20 after reporting 40+ years of shoulder dysfunction. He stated that he had been treated with rehab on several occasions with no lasting success. Following surgery he had 2 months of HHPT and stated that it went well and he has been pleased with his improvement. He is able to reach across his body to put deodorant on and feels his elevation is coming along nicely. He has remained compliant with his HEP from HHPT since d/c in anticipation of beginning OPPT.     He stated that his shoulder pain has been minimal in the last few weeks but continues to nite mild discomfort anteriorly and superiorly. Shoulder pain is worsened with bearing weight through the shoulder and reaching to end-range. He has no pain with most of his daily activities but stated it mostly feels tight. He hopes to return to golf and stated he is motivated towards rehab.         Pain  Current pain ratin  At best pain ratin  At worst pain ratin  Location: L shoulder   Quality: dull ache and tight  Relieving factors: rest and change in position  Aggravating factors: lifting and outstretched reach  Progression: improved    Social Support  Lives in: multiple-level home  Lives with: spouse    Hand dominance: right    Diagnostic Tests  Abnormal x-ray: appropriate position of implant.    Treatments  Previous treatment: physical therapy  Discharged from (in last 30 days): home health care  Patient  Goals  Patient goals for therapy: increased strength, increased motion, independence with ADLs/IADLs and return to sport/leisure activities             Objective          Palpation   Left   No palpable tenderness to the infraspinatus, posterior deltoid and supraspinatus.   Tenderness of the anterior deltoid, biceps, latissimus, teres major and teres minor.     Right   No palpable tenderness to the anterior deltoid, biceps, infraspinatus, latissimus, posterior deltoid, supraspinatus, teres major and teres minor.     Active Range of Motion   Left Shoulder   Flexion: 85 degrees   Abduction: 73 degrees   External rotation 0°: 0 degrees   Internal rotation BTB: sacrum     Right Shoulder   Flexion: 125 degrees   Abduction: 120 degrees   External rotation 0°: 55 degrees   Internal rotation BTB: T9     Passive Range of Motion   Left Shoulder   Flexion: 100 degrees   Abduction: 95 degrees     Strength/Myotome Testing     Left Shoulder     Planes of Motion   Flexion: 3+   Abduction: 4-   External rotation at 0°: 3+   Internal rotation at 0°: 4-     Right Shoulder     Planes of Motion   Flexion: 4-   Abduction: 4+   External rotation at 0°: 4   Internal rotation at 0°: 4+           Assessment & Plan     Assessment  Impairments: abnormal muscle firing, abnormal or restricted ROM, impaired physical strength, lacks appropriate home exercise program and pain with function  Assessment details: The patient is an 86 yo male who presented to PT with evolving characteristics of L shoulder ROM deficits, weakness, and pain with moderate complexity following a rTSA performed 10/19/20. He had 2 months of HHPT and has made progress in regards to ROM but I hope to see both AROM and PROM improve in the next few weeks. Muscle activation of the deltoids is appropriate and he is able to generate force into ER as well. My primary concern at the moment is restoring ROM but I feel this will come along as his strength is improved. He has not had  joint mobs to this point and will likely respond well to these in the clinic. I prescribed an HEP for AAROM exercises into flexion and abduction along with stretches for tight posterior corner musculature. I discussed with him the general outcomes and expectations for this surgery and he appears very motivated towards rehab. I expect the patient to make a timely recovery with skilled PT intervention.     Prognosis: good  Functional Limitations: carrying objects, lifting, reaching behind back, reaching overhead and unable to perform repetitive tasks  Goals  Plan Goals: Short Term Goals (4 weeks):     1. The patient will be independent and compliant with initial HEP.     2. The patient will report pain at rest 0/10 or less and worst pain 0/10 or less.    3. The patient will display decreased TTP in the anterior and superior shoulder and dec mm tension in the surrounding musculature.    4.  L shoulder AROM will improve to flex 95 deg, abd 90 deg, ER 15 deg, IR L5 deg.    5. The patient will demonstrate inc strength evidenced by MMT as follows: flex 4-/5, abd 4-/5, ER 4-/5, and IR 4/5.    6. Quick DASH will improve by 11 points or more.         Long Term Goals (8 weeks):     1. The patient will be appropriate for independent management and compliant with progressed HEP.     2. The patient will report pain at rest 0/10 or less and worst pain 0/10 or less.    3. L shoulder AROM will be flex 110, abd 105, ER 20, and IR L3.    4. The patient will return to work duties and/or ADLs with no limitations due to shoulder pain or dysfunction.    5. The patient will return to recreational and community activities with no limitations due to shoulder pain or dysfunction.      Plan  Therapy options: will be seen for skilled physical therapy services  Planned modality interventions: cryotherapy, iontophoresis, TENS, electrical stimulation/Russian stimulation and thermotherapy (hydrocollator packs)  Planned therapy interventions: ADL  retraining, body mechanics training, flexibility, functional ROM exercises, home exercise program, joint mobilization, manual therapy, neuromuscular re-education, postural training, soft tissue mobilization, strengthening, therapeutic activities and stretching  Frequency: 2x week  Duration in visits: 16  Duration in weeks: 8  Treatment plan discussed with: patient  Plan details: The patient will likely benefit from TE/TA/NMED to improve RC strength, UE proprioception, and scapular mobility. MT will be utilized in addition to stretching for improved GH jt mobility and AROM. Modalities will be used as needed for pain modulation and reduction of swelling. Dry needling as indicated.         Visit Diagnoses:  No diagnosis found.    Timed:  Manual Therapy:    0     mins  19180;  Therapeutic Exercise:    10     mins  10223;     Neuromuscular Lisbeth:    0    mins  87250;    Therapeutic Activity:     0     mins  77998;     Gait Trainin     mins  46819;     Ultrasound:     0     mins  01178;    Electrical Stimulation:    0     mins  81316 ( );    Untimed:  Electrical Stimulation:    0     mins  10701 ( );  Mechanical Traction:    0     mins  29170;     Timed Treatment:   10   mins   Total Treatment:     45   mins    PT SIGNATURE: Jude Bradley PT   DATE TREATMENT INITIATED: 2021      Initial Certification  Certification Period: 2021  I certify that the therapy services are furnished while this patient is under my care.  The services outlined above are required by this patient, and will be reviewed every 90 days.     PHYSICIAN: Tan Miranda MD      DATE:     Please sign and return via fax to [phone].. Thank you, [facility] Physical Therapy.

## 2021-03-03 ENCOUNTER — TREATMENT (OUTPATIENT)
Dept: PHYSICAL THERAPY | Facility: CLINIC | Age: 86
End: 2021-03-03

## 2021-03-03 DIAGNOSIS — R53.1 WEAKNESS: ICD-10-CM

## 2021-03-03 DIAGNOSIS — G89.29 CHRONIC LEFT SHOULDER PAIN: Primary | ICD-10-CM

## 2021-03-03 DIAGNOSIS — M25.512 CHRONIC LEFT SHOULDER PAIN: Primary | ICD-10-CM

## 2021-03-03 DIAGNOSIS — M25.60 RANGE OF MOTION DEFICIT: ICD-10-CM

## 2021-03-03 PROCEDURE — 97140 MANUAL THERAPY 1/> REGIONS: CPT | Performed by: PHYSICAL THERAPIST

## 2021-03-03 PROCEDURE — 97110 THERAPEUTIC EXERCISES: CPT | Performed by: PHYSICAL THERAPIST

## 2021-03-03 NOTE — PROGRESS NOTES
Physical Therapy Daily Treatment Note      Patient: Alin Lara   : 1935  Referring practitioner: Tan Miranda MD  Date of Initial Visit: Type: THERAPY  Noted: 2021  Today's Date: 3/3/2021  Patient seen for 2 sessions           Subjective Evaluation    History of Present Illness  Mechanism of injury: The pt stated that his shoulder has not been bothering him much in the last week and he feels that his HEP is going well. He continues to report pain with end-range flexion, abduction, and horizontal adduction along with tightness in the posterior corner.     Pain  Current pain ratin  Location: L shoulder           Objective          Active Range of Motion   Left Shoulder   Flexion: 85 degrees   Abduction: 80 degrees       See Exercise, Manual, and Modality Logs for complete treatment.       Assessment & Plan     Assessment  Assessment details: The patient reported compliance with his HEP but demonstrated difficulty with set up on most exercises so they were reviewed in the clinic and practiced with supervision. He reported no pain in the shoulder upon presentation but had pain with passive motion at end ranges and with palpation to the posterior corner and the anterior shoulder. He has excessive posterior corner tightness and was encouraged to stretch frequently and slightly more aggressively than what was demonstrated in the clinic today. Joint mobilizations were performed for the first time and were tolerated moderately well but were accompanied by muscle guarding that limited mobility. I anticipate that this will improve as the patient becomes more comfortable with manual therapy and as his pain levels decrease. He saw fair improvement into active abduction.    Plan  Plan details: Continue aggressive stretching and active range of motion exercises in gravity eliminated planes as tolerated. Repeat manual therapy with emphasis on mobilizing the posterior corner.        Visit  Diagnoses:    ICD-10-CM ICD-9-CM   1. Chronic left shoulder pain  M25.512 719.41    G89.29 338.29   2. Weakness  R53.1 780.79   3. Range of motion deficit  M25.60 719.50       Progress per Plan of Care           Timed:  Manual Therapy:    15     mins  72343;  Therapeutic Exercise:    30     mins  39604;     Neuromuscular Lisbeth:    0    mins  90172;    Therapeutic Activity:     0     mins  05089;     Gait Trainin     mins  27067;     Ultrasound:     0     mins  71601;    Electrical Stimulation:    0     mins  45824 ( );    Untimed:  Electrical Stimulation:    0     mins  67409 ( );  Mechanical Traction:    0     mins  11515;     Timed Treatment:   45   mins   Total Treatment:     45   mins  Jude Bradley PT  Physical Therapist

## 2021-03-05 ENCOUNTER — TREATMENT (OUTPATIENT)
Dept: PHYSICAL THERAPY | Facility: CLINIC | Age: 86
End: 2021-03-05

## 2021-03-05 DIAGNOSIS — G89.29 CHRONIC LEFT SHOULDER PAIN: Primary | ICD-10-CM

## 2021-03-05 DIAGNOSIS — M25.60 RANGE OF MOTION DEFICIT: ICD-10-CM

## 2021-03-05 DIAGNOSIS — M25.512 CHRONIC LEFT SHOULDER PAIN: Primary | ICD-10-CM

## 2021-03-05 DIAGNOSIS — R53.1 WEAKNESS: ICD-10-CM

## 2021-03-05 PROCEDURE — 97110 THERAPEUTIC EXERCISES: CPT | Performed by: PHYSICAL THERAPIST

## 2021-03-05 PROCEDURE — 97140 MANUAL THERAPY 1/> REGIONS: CPT | Performed by: PHYSICAL THERAPIST

## 2021-03-05 NOTE — PROGRESS NOTES
Physical Therapy Daily Treatment Note      Patient: Alin Lara   : 1935  Referring practitioner: Tan Miranda MD  Date of Initial Visit: Type: THERAPY  Noted: 2021  Today's Date: 3/5/2021  Patient seen for 3 sessions           Subjective Evaluation    History of Present Illness  Mechanism of injury: The pt stated that his shoulder was sore following his last visit and has persisted to some degree to today. He managed symptoms with Tylenol and felt it helped. He has remained compliant with his HEP and feels more confident performing them.    Pain  Current pain ratin  Location: L shoulder           Objective          Active Range of Motion     Right Shoulder   Flexion: 88 degrees   Abduction: 83 degrees       See Exercise, Manual, and Modality Logs for complete treatment.       Assessment & Plan     Assessment  Assessment details: The pt appeared much more confident with his HEP exercises today and required minimal cuing for familiar exercises. Muscle guarding continued to limit manual therapy progressions but it was less severe than his last visit and allowed me to move him past 90 deg. He was introduced to deltoid-assisted exercises today which resulted in immediate improvement in UE elevation past 90 deg during performance. His HEP was left unchanged, as his greatest deficit is mm tightness and he will need significant time stretching, but deltoid strengthening will likely be a large part of in-person rehab moving forward.     Plan  Plan details: Continue progressions of AAROM exercises, deltoid-assisted elevation, and MT.         Visit Diagnoses:    ICD-10-CM ICD-9-CM   1. Chronic left shoulder pain  M25.512 719.41    G89.29 338.29   2. Weakness  R53.1 780.79   3. Range of motion deficit  M25.60 719.50       Progress per Plan of Care           Timed:  Manual Therapy:    15     mins  73705;  Therapeutic Exercise:    30     mins  27136;     Neuromuscular Lisbeth:    0    mins  25537;     Therapeutic Activity:     0     mins  63345;     Gait Trainin     mins  86321;     Ultrasound:     0     mins  94986;    Electrical Stimulation:    0     mins  97439 ( );    Untimed:  Electrical Stimulation:    0     mins  43661 ( );  Mechanical Traction:    0     mins  88495;     Timed Treatment:   45   mins   Total Treatment:     45   mins  Jude Bradley PT  Physical Therapist

## 2021-03-12 ENCOUNTER — TREATMENT (OUTPATIENT)
Dept: PHYSICAL THERAPY | Facility: CLINIC | Age: 86
End: 2021-03-12

## 2021-03-12 DIAGNOSIS — M25.512 CHRONIC LEFT SHOULDER PAIN: Primary | ICD-10-CM

## 2021-03-12 DIAGNOSIS — M25.60 RANGE OF MOTION DEFICIT: ICD-10-CM

## 2021-03-12 DIAGNOSIS — R53.1 WEAKNESS: ICD-10-CM

## 2021-03-12 DIAGNOSIS — G89.29 CHRONIC LEFT SHOULDER PAIN: Primary | ICD-10-CM

## 2021-03-12 PROCEDURE — 97110 THERAPEUTIC EXERCISES: CPT | Performed by: PHYSICAL THERAPIST

## 2021-03-12 PROCEDURE — 97140 MANUAL THERAPY 1/> REGIONS: CPT | Performed by: PHYSICAL THERAPIST

## 2021-03-12 NOTE — PROGRESS NOTES
Physical Therapy Daily Treatment Note      Patient: Alin Lara   : 1935  Referring practitioner: Tan Miranda MD  Date of Initial Visit: Type: THERAPY  Noted: 2021  Today's Date: 3/12/2021  Patient seen for 4 sessions           Subjective Evaluation    History of Present Illness  Mechanism of injury: The pt stated that his shoulder was sore after his last visit but soreness dissipated within a couple of days and symptoms returned to baseline. He has remained compliant with his HEP and feels it is getting easier everyday. He feels his motion is improving and noted that he is ready for progressions.    Pain  Current pain ratin  Location: L shoulder            Objective   See Exercise, Manual, and Modality Logs for complete treatment.       Assessment & Plan     Assessment  Assessment details: The pt displayed less mm guarding with MT today and allowed passive ROM to appx 120 deg flexion and 105 deg abduction. AAROM was performed to about these heights as well but he reported pain at end ranges, particularly in abduction. PNF contract-relax was introduced and resulted in improved mm activation of the deltoids and increased ROM. He noted that his HEP was too easy so it was modified today to include increased resistance via gravity and advice to perform all activities to end-range. He was encouraged to begin using the pulleys he had at home health.     Plan  Plan details: Perform reassessment and progress AAROM and deltoid strengthening exercises as tolerated.         Visit Diagnoses:    ICD-10-CM ICD-9-CM   1. Chronic left shoulder pain  M25.512 719.41    G89.29 338.29   2. Weakness  R53.1 780.79   3. Range of motion deficit  M25.60 719.50       Progress per Plan of Care           Timed:  Manual Therapy:    20     mins  68657;  Therapeutic Exercise:    23     mins  25719;     Neuromuscular Lisbeth:    0    mins  07040;    Therapeutic Activity:     0     mins  12928;     Gait Training:       0     mins  59638;     Ultrasound:     0     mins  44876;    Electrical Stimulation:    0     mins  35076 ( );    Untimed:  Electrical Stimulation:    0     mins  65364 ( );  Mechanical Traction:    0     mins  32181;     Timed Treatment:   43   mins   Total Treatment:     43   mins  Jude Bradley PT  Physical Therapist

## 2021-03-22 ENCOUNTER — TREATMENT (OUTPATIENT)
Dept: PHYSICAL THERAPY | Facility: CLINIC | Age: 86
End: 2021-03-22

## 2021-03-22 DIAGNOSIS — R53.1 WEAKNESS: ICD-10-CM

## 2021-03-22 DIAGNOSIS — M25.512 CHRONIC LEFT SHOULDER PAIN: Primary | ICD-10-CM

## 2021-03-22 DIAGNOSIS — G89.29 CHRONIC LEFT SHOULDER PAIN: Primary | ICD-10-CM

## 2021-03-22 DIAGNOSIS — M25.60 RANGE OF MOTION DEFICIT: ICD-10-CM

## 2021-03-22 PROCEDURE — 97110 THERAPEUTIC EXERCISES: CPT | Performed by: PHYSICAL THERAPIST

## 2021-03-22 PROCEDURE — 97140 MANUAL THERAPY 1/> REGIONS: CPT | Performed by: PHYSICAL THERAPIST

## 2021-03-22 NOTE — PROGRESS NOTES
Physical Therapy Daily Treatment Note      Patient: Alin Lara   : 1935  Referring practitioner: Tan Miranda MD  Date of Initial Visit: Type: THERAPY  Noted: 2021  Today's Date: 3/22/2021  Patient seen for 5 sessions           Subjective Evaluation    History of Present Illness  Mechanism of injury: The pt stated that his shoulder has been pain-free in the last week and stated he is satisfied with his rehab thus far. He continues to report difficulty with reaching across his body but believes it is getting easier. He has been compliant with his HEP and stated they are going well but reported he has not been able to use the pulleys because he cannot find them.     Pain  Current pain ratin  Location: L shoulder            Objective          Active Range of Motion   Left Shoulder   Flexion: 88 degrees   Abduction: 83 degrees       See Exercise, Manual, and Modality Logs for complete treatment.       Assessment & Plan     Assessment  Assessment details: The pt has not been seen in 10 days due to pt cancellations, though he stated that he has remained compliant with his HEP. He displayed no change in AROM today and had a slight regression in PROM along with a return of mm guarding during MT. The limitation in progression between visits is likely due to inconsistent treatment but also to exercise technique. During review of his HEP he performed most exercises very quickly, with compensations, and through partial ROM. He was educated on the importance of moving into full ranges and appropriate technique for each exercise was reviewed. Following education and practice, technique was improved overall and compensations were limited. He did better with wall slides when resistance was added due to increased mm activation and his HEP was modified to reflect this change. His flexion stretches were also changed due to inconsistency and he was encouraged to increase the duration of the hold and to  move to end range. PNF exercises were successful in improving mm activation and led to a subsequent increase in motion, but this was short-lived and decreased with fatigue.     Plan  Plan details: Perform reassessment and progress exercises as tolerated.         Visit Diagnoses:    ICD-10-CM ICD-9-CM   1. Chronic left shoulder pain  M25.512 719.41    G89.29 338.29   2. Weakness  R53.1 780.79   3. Range of motion deficit  M25.60 719.50       Progress per Plan of Care           Timed:  Manual Therapy:    15     mins  16344;  Therapeutic Exercise:    30     mins  72332;     Neuromuscular Lisbeth:    0    mins  96189;    Therapeutic Activity:     0     mins  56331;     Gait Trainin     mins  91654;     Ultrasound:     0     mins  73585;    Electrical Stimulation:    0     mins  04485 ( );    Untimed:  Electrical Stimulation:    0     mins  30274 ( );  Mechanical Traction:    0     mins  49769;     Timed Treatment:   45   mins   Total Treatment:     45   mins  Jude Bradley PT  Physical Therapist

## 2021-03-25 ENCOUNTER — OFFICE VISIT (OUTPATIENT)
Dept: CARDIAC SURGERY | Facility: CLINIC | Age: 86
End: 2021-03-25

## 2021-03-25 VITALS
HEIGHT: 73 IN | WEIGHT: 253.2 LBS | HEART RATE: 50 BPM | TEMPERATURE: 97.2 F | DIASTOLIC BLOOD PRESSURE: 78 MMHG | BODY MASS INDEX: 33.56 KG/M2 | OXYGEN SATURATION: 98 % | SYSTOLIC BLOOD PRESSURE: 128 MMHG

## 2021-03-25 DIAGNOSIS — I26.99 ACUTE PULMONARY EMBOLISM, UNSPECIFIED PULMONARY EMBOLISM TYPE, UNSPECIFIED WHETHER ACUTE COR PULMONALE PRESENT (HCC): ICD-10-CM

## 2021-03-25 DIAGNOSIS — M46.1 INFLAMMATION OF SACROILIAC JOINT (HCC): ICD-10-CM

## 2021-03-25 PROCEDURE — 99212 OFFICE O/P EST SF 10 MIN: CPT | Performed by: THORACIC SURGERY (CARDIOTHORACIC VASCULAR SURGERY)

## 2021-03-25 NOTE — PROGRESS NOTES
03/25/2021  Patient Information  Alin GARCIA [facility] [id number]  [facility] [address]   1935  'PCP/Referring Physician'  Ravi Neville MD  No ref. provider found  Chief Complaint   Patient presents with   • Follow-up     2 month follow up for venous insuffieciency. Patient has been wearing compression stocking everyday        CC: I am here to have my legs checked    History of Present Illness: 85-year-old  male being seen today in follow-up of his chronic venous insufficiency.  This gentleman underwent a right knee replacement approximately 1-1/2 years ago followed in a couple of months by left knee replacement.  Following the left knee replacement he developed deep venous thrombosis and subsequently had a pulmonary embolus.  He was treated initially with anticoagulation.  He has done reasonably well however he continues to have swelling and discomfort in the left lower extremity.  He was seen 2 months ago at which time we fitted him for compression stockings.  He tells me that he wears the compression stockings on a regular basis although he does not have them on today.  He states the compression stockings have helped somewhat but he still has chronic pain in the left lower extremity.  He feels like the swelling in the leg is somewhat less than it has been.  He has been on Xarelto now for approximately 6 months.  There is no evidence of recurrent deep venous thrombosis.  He does have an element of mild/moderate venous insufficiency on the left.      Patient Active Problem List   Diagnosis   • Paroxysmal atrial fibrillation (CMS/HCC)   • Basal cell carcinoma of skin   • Impotence of organic origin   • Gastroesophageal reflux disease   • Hyperlipidemia   • Hypertension   • Persistent insomnia   • Knee pain   • Osteoarthritis of lumbar spine   • Adiposity   • Overactive bladder   • Arthralgia of hip   •  Impaired glucose tolerance   • Prostatism   • History of pulmonary embolism (CMS/HCC)   • Inflammation of sacroiliac joint (CMS/HCC)   • Generalized osteoarthritis   • Preventative health care   • Vertigo   • TIA (transient ischemic attack)   • Lymphocytic colitis   • Esophageal stricture   • Left shoulder pain   • Primary localized osteoarthrosis of left shoulder region   • Contracture of joint of left shoulder region   • Biceps tendinitis of left upper extremity   • Status post reverse total shoulder replacement, left   • Leukocytosis, likely reactive   • Acute postoperative pain     Past Medical History:   Diagnosis Date   • Basal cell carcinoma    • Blood loss     post op   • ED (erectile dysfunction)     Responsive to Viagra   • Failed total knee, right (CMS/HCC)    • Fasting hypoglycemia 2016    Hemoglobin A1c normal   • Generalized osteoarthritis    • GERD (gastroesophageal reflux disease)     History esophageal stricture   • History of blood clots    • HNP (herniated nucleus pulposus), lumbar 1988, 2014    L5 L6   • Hyperglycemia     not diabetic, pt states hes never has high blood sugar per pt    • Hyperlipidemia    • Hypertension    • Microscopic colitis 2017    Positive biopsy - colonoscopy   • OAB (overactive bladder) 2000    Persistent urgency   • Obesity Adulthood    2012 body weight 244 BMI 32   • Painful total knee replacement (CMS/HCC)    • Paroxysmal atrial fibrillation (CMS/HCC) 1991    one episode - cardioverted   • Prostatism 2014   • Pulmonary embolism (CMS/HCC) 1991    after injury and protracted immobilization   • Right knee DJD 2015    Partial knee arthroplasty   • Shoulder dislocation     Repeated episodes   • Sleep disturbances 1990    Frequently requires medication   • Transient cerebral ischemia 2010, 2016    Negative medical workup on both occasions   • Venous stasis    • Vertigo    • Wears glasses      Past Surgical History:   Procedure Laterality Date   • CARDIOVERSION  1991    Atrial  fibrillation   • COLONOSCOPY      2018   • KNEE ARTHROPLASTY Right 2015    Partial    • KNEE ARTHROPLASTY Left    • KNEE ARTHROSCOPY Right 2015    Failed procedure   • LUMBAR DISC SURGERY  2014    Surgery ×2 Ruptured disc   • LUMBAR DISCECTOMY  1988    L5 L6    • SHOULDER SURGERY     • TOTAL SHOULDER ARTHROPLASTY W/ DISTAL CLAVICLE EXCISION Left 10/19/2020    Procedure: TOTAL SHOULDER REVERSE ARTHROPLASTY LEFT;  Surgeon: Tan Miranda MD;  Location: [facility];  Service: Orthopedics;  Laterality: Left;       Current Outpatient Medications:   •  ascorbic acid (VITAMIN C) 1000 MG tablet, Take 1 capsule by mouth Daily., Disp: , Rfl:   •  atenolol (TENORMIN) 50 MG tablet, TAKE 1 TABLET BY MOUTH DAILY., Disp: 90 tablet, Rfl: 1  •  CALCIUM-MAGNESIUM-ZINC PO, Take 1 tablet by mouth Daily., Disp: , Rfl:   •  Cholecalciferol (VITAMIN D-3) 25 MCG (1000 UT) capsule, Take  by mouth., Disp: , Rfl:   •  Coenzyme Q10 200 MG capsule, Take 200 mg by mouth Daily., Disp: 90 capsule, Rfl: 1  •  docusate sodium (Colace) 100 MG capsule, Take 1 capsule by mouth 2 (Two) Times a Day., Disp: 60 capsule, Rfl: 0  •  Multiple Vitamins-Minerals (CENTRUM ADULTS) tablet, Take 1-2 tablets by mouth daily., Disp: , Rfl:   •  oxybutynin XL (DITROPAN-XL) 5 MG 24 hr tablet, Take 1 tablet by mouth Every 12 (Twelve) Hours. (Patient taking differently: Take 5 mg by mouth every night at bedtime.), Disp: 180 tablet, Rfl: 1  •  rivaroxaban (XARELTO) 20 MG tablet, Take 1 tablet by mouth Daily. Resume 10/22/2020, Disp: 30 tablet, Rfl:   •  Ropivacine HCl-NaCl (NAROPIN), 12 mg/hr by Peripheral Nerve route Continuous., Disp:  , Rfl:   •  sildenafil (VIAGRA) 100 MG tablet, Take 0.5-1 tablets by mouth as needed., Disp: , Rfl:   •  tamsulosin (FLOMAX) 0.4 MG capsule 24 hr capsule, TAKE 1 CAPSULE EVERY 12 HOURS (Patient taking differently: 2 capsules every night at bedtime.), Disp: 180 capsule, Rfl: 1  •  Zinc 50 MG capsule, Take  by mouth., Disp: , Rfl:    Allergies   Allergen Reactions   • Pravastatin Other (See Comments)     Nocturnal leg cramps   • Atorvastatin      Fatigue   • Qsymia [Phentermine-Topiramate] Mental Status Change   • Topiramate Myalgia     Social History     Socioeconomic History   • Marital status:      Spouse name: Not on file   • Number of children: 2   • Years of education: Not on file   • Highest education level: Not on file   Tobacco Use   • Smoking status: Never Smoker   • Smokeless tobacco: Former User     Types: Chew   Substance and Sexual Activity   • Alcohol use: Yes     Alcohol/week: 1.0 standard drinks     Types: 1 Cans of beer per week     Comment: Occasionally   • Drug use: No   • Sexual activity: Yes     Partners: Female     Comment: Monogamous     Family History   Problem Relation Age of Onset   • Dementia Mother          age 94   • Other Father          age 86 of unknown cause   • Rheum arthritis Father    • Coronary artery disease Brother    • No Known Problems Brother    • Sick sinus syndrome Other         Has pacemaker   • Diabetes Paternal Grandmother        ROS, past medical history, surgical history, family history, social history and vitals  reviewed by me and corrected as needed.        Review of Systems   Constitutional: Negative for chills, fever, malaise/fatigue, night sweats and weight loss.   HENT: Negative for hearing loss, odynophagia and sore throat.    Cardiovascular: Negative for chest pain, dyspnea on exertion, leg swelling, orthopnea and palpitations.   Respiratory: Negative for cough and hemoptysis.    Endocrine: Negative for cold intolerance, heat intolerance, polydipsia, polyphagia and polyuria.   Hematologic/Lymphatic: Does not bruise/bleed easily.   Skin: Negative for itching and rash.   Musculoskeletal: Negative for joint pain, joint swelling and myalgias.        Felt like he pulled a groin muscle in left leg    Gastrointestinal: Negative for abdominal pain, constipation, diarrhea,  "hematemesis, hematochezia, melena, nausea and vomiting.   Genitourinary: Negative for dysuria, frequency and hematuria.   Neurological: Negative for focal weakness, headaches, numbness and seizures.   Psychiatric/Behavioral: Negative for suicidal ideas.   All other systems reviewed and are negative.      Vitals:    03/25/21 0953   BP: 128/78   BP Location: Right arm   Patient Position: Sitting   Pulse: 50   Temp: 97.2 °F (36.2 °C)   SpO2: 98%   Weight: 115 kg (253 lb 3.2 oz)   Height: 185.4 cm (73\")        Physical Exam  Vitals and nursing note reviewed.   Constitutional:       General: He is not in acute distress.     Appearance: Normal appearance. He is normal weight.   HENT:      Head: Normocephalic and atraumatic.      Right Ear: External ear normal.      Left Ear: External ear normal.      Nose: Nose normal.      Mouth/Throat:      Mouth: Mucous membranes are moist.   Eyes:      Extraocular Movements: Extraocular movements intact.      Pupils: Pupils are equal, round, and reactive to light.   Cardiovascular:      Rate and Rhythm: Normal rate and regular rhythm.      Pulses: Normal pulses.      Heart sounds: Normal heart sounds.   Pulmonary:      Effort: Pulmonary effort is normal. No respiratory distress.   Musculoskeletal:         General: No swelling or tenderness.      Cervical back: Normal range of motion. No muscular tenderness.      Right lower leg: No edema.      Left lower leg: No edema.      Comments: 1+ pretibial edema of the left lower extremity.  No calf tenderness.  Dorsalis pedis and posterior tibial pulses are intact bilaterally.   Skin:     General: Skin is warm and dry.      Capillary Refill: Capillary refill takes less than 2 seconds.      Coloration: Skin is not jaundiced.      Findings: No erythema.   Neurological:      General: No focal deficit present.      Mental Status: He is alert and oriented to person, place, and time. Mental status is at baseline.   Psychiatric:         Mood and " Affect: Mood normal.         Behavior: Behavior normal.         Thought Content: Thought content normal.         Judgment: Judgment normal.             Labs: None    Imaging: None    Assessment: Mild/moderate chronic venous insufficiency.    Plan: Patient will stop Xarelto and continue compression stockings.  He will return to this clinic in 6 months with a follow-up venous ultrasound.        Baljinder Zamora MD  CTSurgery  03/25/21   10:30 EDT

## 2021-03-29 ENCOUNTER — TREATMENT (OUTPATIENT)
Dept: PHYSICAL THERAPY | Facility: CLINIC | Age: 86
End: 2021-03-29

## 2021-03-29 DIAGNOSIS — G89.29 CHRONIC LEFT SHOULDER PAIN: Primary | ICD-10-CM

## 2021-03-29 DIAGNOSIS — M25.512 CHRONIC LEFT SHOULDER PAIN: Primary | ICD-10-CM

## 2021-03-29 DIAGNOSIS — M25.60 RANGE OF MOTION DEFICIT: ICD-10-CM

## 2021-03-29 DIAGNOSIS — R53.1 WEAKNESS: ICD-10-CM

## 2021-03-29 PROCEDURE — 97140 MANUAL THERAPY 1/> REGIONS: CPT | Performed by: PHYSICAL THERAPIST

## 2021-03-29 PROCEDURE — 97110 THERAPEUTIC EXERCISES: CPT | Performed by: PHYSICAL THERAPIST

## 2021-03-29 NOTE — PROGRESS NOTES
Re-Assessment / Re-Certification    Patient: Alin Lara   : 1935  Diagnosis/ICD-10 Code:  Chronic left shoulder pain [M25.512, G89.29]  Referring practitioner: Tan Miranda MD  Date of Initial Visit: Type: THERAPY  Noted: 2021  Today's Date: 3/29/2021  Patient seen for 6 sessions      Subjective:     Subjective Questionnaire: QuickDASH: 18  Clinical Progress: improved  Home Program Compliance: Yes  Treatment has included: therapeutic exercise, neuromuscular re-education, manual therapy and therapeutic activity    Subjective Evaluation    History of Present Illness  Mechanism of injury: The pt stated that his shoulder was bothering him when moving to end-ranges this morning, but stated he has not felt like that since his last visit. He stated that he has been compliant with his HEP but admitted that he has not been trying to raise his arm up very high. He has not been using his arm for much during the day and stated he remains concerned that he will dislocate his shoulder again. He remains pleased with his progress and stated that he is now able to reach across his body to apply deodorant and wash his R arm with decreased effort and no pain.    Pain  Current pain ratin  At worst pain ratin  Location: L shoulder         Objective          Palpation   Left   No palpable tenderness to the anterior deltoid, biceps, infraspinatus, posterior deltoid and supraspinatus.   Tenderness of the latissimus, teres major and teres minor.     Right   No palpable tenderness to the anterior deltoid, biceps, infraspinatus, latissimus, posterior deltoid, supraspinatus, teres major and teres minor.     Active Range of Motion   Left Shoulder   Flexion: 88 degrees   Abduction: 82 degrees   External rotation 0°: 20 degrees   Internal rotation BTB: sacrum     Right Shoulder   Flexion: 125 degrees   Abduction: 120 degrees   External rotation 0°: 55 degrees   Internal rotation BTB: T9     Passive Range of  Motion   Left Shoulder   Flexion: 100 degrees   Abduction: 95 degrees     Strength/Myotome Testing     Left Shoulder     Planes of Motion   Flexion: 4-   Abduction: 4-   External rotation at 0°: 3+   Internal rotation at 0°: 4     Right Shoulder     Planes of Motion   Flexion: 4-   Abduction: 4+   External rotation at 0°: 4   Internal rotation at 0°: 4+       Assessment & Plan     Assessment  Impairments: abnormal muscle firing, abnormal or restricted ROM, impaired physical strength, lacks appropriate home exercise program and pain with function  Assessment details: The patient has not seen much improvement in regards to range of motion since beginning outpatient therapy. This is attributable to several causes including infrequent visits, ongoing muscle guarding, and patient reported fear of dislocation that has led to reduced activity levels and less aggressive performance of his HEP. We discussed the importance of having frequent intervention, using the arm functionally throughout the day, and challenging himself during his stretches and active range of motion exercises to reach end-ranges. Pain levels have been low but he continues to report discomfort in the posterior corner and the top of the shoulder with stretching to end range elevation. He has met a couple of his short term goals for rehab but will require ongoing intervention to meet the criteria for discharge and to see a return in optimal function.  Prognosis: good  Functional Limitations: carrying objects, lifting, reaching behind back, reaching overhead and unable to perform repetitive tasks  Goals  Plan Goals: Short Term Goals (4 weeks):     1. The patient will be independent and compliant with initial HEP. Met    2. The patient will report pain at rest 0/10 or less and worst pain 0/10 or less. Ongoing     3. The patient will display decreased TTP in the anterior and superior shoulder and dec mm tension in the surrounding musculature. Partially  met    4.  L shoulder AROM will improve to flex 95 deg, abd 90 deg, ER 15 deg, IR L5 deg. Ongoing    5. The patient will demonstrate inc strength evidenced by MMT as follows: flex 4-/5, abd 4-/5, ER 4-/5, and IR 4/5. Ongoing     6. Quick DASH will improve by 11 points or more. Ongoing        Long Term Goals (8 weeks):     1. The patient will be appropriate for independent management and compliant with progressed HEP. Ongoing     2. The patient will report pain at rest 0/10 or less and worst pain 0/10 or less. Ongoing     3. L shoulder AROM will be flex 110, abd 105, ER 20, and IR L3. Ongoing     4. The patient will return to work duties and/or ADLs with no limitations due to shoulder pain or dysfunction. Ongoing     5. The patient will return to recreational and community activities with no limitations due to shoulder pain or dysfunction. Ongoing       Plan  Therapy options: will be seen for skilled physical therapy services  Planned modality interventions: cryotherapy, iontophoresis, TENS, electrical stimulation/Russian stimulation and thermotherapy (hydrocollator packs)  Planned therapy interventions: ADL retraining, body mechanics training, flexibility, functional ROM exercises, home exercise program, joint mobilization, manual therapy, neuromuscular re-education, postural training, soft tissue mobilization, strengthening, therapeutic activities and stretching  Frequency: 2x week  Duration in visits: 16  Duration in weeks: 8  Treatment plan discussed with: patient  Plan details: The patient will likely benefit from TE/TA/NMED to improve RC strength, UE proprioception, and scapular mobility. MT will be utilized in addition to stretching for improved GH jt mobility and AROM. Modalities will be used as needed for pain modulation and reduction of swelling. Dry needling as indicated.         Visit Diagnoses:    ICD-10-CM ICD-9-CM   1. Chronic left shoulder pain  M25.512 719.41    G89.29 338.29   2. Weakness  R53.1  780.79   3. Range of motion deficit  M25.60 719.50       Progress toward previous goals: Partially Met    PT Signature: Jude Bradley PT      Based upon review of the patient's progress and continued therapy plan, it is my medical opinion that Alin Lara should continue physical therapy treatment at [facility]  [address] [place] [id number] [phone].    Signature: __________________________________  Tan Miranda MD    Timed:  Manual Therapy:    25     mins  57704;  Therapeutic Exercise:    20     mins  00096;     Neuromuscular Lisbeth:    0    mins  11256;    Therapeutic Activity:     0     mins  32350;     Gait Trainin     mins  35181;     Ultrasound:     0     mins  18697;    Electrical Stimulation:    0     mins  53733 ( );    Untimed:  Electrical Stimulation:    0     mins  47049 ( );  Mechanical Traction:    0     mins  28754;     Timed Treatment:   45   mins   Total Treatment:     45   mins

## 2021-03-31 ENCOUNTER — TREATMENT (OUTPATIENT)
Dept: PHYSICAL THERAPY | Facility: CLINIC | Age: 86
End: 2021-03-31

## 2021-03-31 DIAGNOSIS — M25.512 CHRONIC LEFT SHOULDER PAIN: Primary | ICD-10-CM

## 2021-03-31 DIAGNOSIS — M25.60 RANGE OF MOTION DEFICIT: ICD-10-CM

## 2021-03-31 DIAGNOSIS — G89.29 CHRONIC LEFT SHOULDER PAIN: Primary | ICD-10-CM

## 2021-03-31 DIAGNOSIS — R53.1 WEAKNESS: ICD-10-CM

## 2021-03-31 PROCEDURE — 97110 THERAPEUTIC EXERCISES: CPT | Performed by: PHYSICAL THERAPIST

## 2021-03-31 PROCEDURE — 97140 MANUAL THERAPY 1/> REGIONS: CPT | Performed by: PHYSICAL THERAPIST

## 2021-03-31 NOTE — PROGRESS NOTES
Physical Therapy Daily Treatment Note      Patient: Alin Lara   : 1935  Referring practitioner: Tan Miranda MD  Date of Initial Visit: Type: THERAPY  Noted: 2021  Today's Date: 3/31/2021  Patient seen for 7 sessions           Subjective Evaluation    History of Present Illness  Mechanism of injury: The pt stated that he was sore and fatigued after his last visit but did not have much pain. He remained pain-free this morning, but stated he has general discomfort with manual therapy. He has remained compliant with his HEP.     Pain  Current pain ratin  Location: L shoulder           Objective   See Exercise, Manual, and Modality Logs for complete treatment.       Assessment & Plan     Assessment  Assessment details: MT was performed more aggressively today and resulted in increased PROM to appx 115 of elevation. This translated to AROM in the CKC consistently at or above 95 deg during exercise in the clinic. Prior to today, he had been unable to elevate past 90 even in the CKC. Endurance remains an issue and he was encouraged to focus on the quality of the exercise rather than the quantity, and holds at end-ranges were incorporated into most exercises. His HEP was modified to include a table slide instead of a flexion stretch on a table due to increased consistency of elevation.     Plan  Plan details: Continue aggressive MT and AROM exercises into end-ranges.         Visit Diagnoses:    ICD-10-CM ICD-9-CM   1. Chronic left shoulder pain  M25.512 719.41    G89.29 338.29   2. Weakness  R53.1 780.79   3. Range of motion deficit  M25.60 719.50       Progress per Plan of Care           Timed:  Manual Therapy:    17     mins  46227;  Therapeutic Exercise:    24     mins  85873;     Neuromuscular Lisbeth:    0    mins  12812;    Therapeutic Activity:     0     mins  76720;     Gait Trainin     mins  59997;     Ultrasound:     0     mins  17592;    Electrical Stimulation:    0      mins  12110 ( );    Untimed:  Electrical Stimulation:    0     mins  78208 ( );  Mechanical Traction:    0     mins  72542;     Timed Treatment:   41   mins   Total Treatment:     41   mins  Jude Bradley PT  Physical Therapist

## 2021-04-05 ENCOUNTER — TREATMENT (OUTPATIENT)
Dept: PHYSICAL THERAPY | Facility: CLINIC | Age: 86
End: 2021-04-05

## 2021-04-05 DIAGNOSIS — M25.512 CHRONIC LEFT SHOULDER PAIN: Primary | ICD-10-CM

## 2021-04-05 DIAGNOSIS — M25.60 RANGE OF MOTION DEFICIT: ICD-10-CM

## 2021-04-05 DIAGNOSIS — G89.29 CHRONIC LEFT SHOULDER PAIN: Primary | ICD-10-CM

## 2021-04-05 DIAGNOSIS — R53.1 WEAKNESS: ICD-10-CM

## 2021-04-05 PROCEDURE — 97110 THERAPEUTIC EXERCISES: CPT | Performed by: PHYSICAL THERAPIST

## 2021-04-05 PROCEDURE — 97140 MANUAL THERAPY 1/> REGIONS: CPT | Performed by: PHYSICAL THERAPIST

## 2021-04-08 ENCOUNTER — TREATMENT (OUTPATIENT)
Dept: PHYSICAL THERAPY | Facility: CLINIC | Age: 86
End: 2021-04-08

## 2021-04-08 DIAGNOSIS — G89.29 CHRONIC LEFT SHOULDER PAIN: Primary | ICD-10-CM

## 2021-04-08 DIAGNOSIS — M25.60 RANGE OF MOTION DEFICIT: ICD-10-CM

## 2021-04-08 DIAGNOSIS — R53.1 WEAKNESS: ICD-10-CM

## 2021-04-08 DIAGNOSIS — M25.512 CHRONIC LEFT SHOULDER PAIN: Primary | ICD-10-CM

## 2021-04-08 PROCEDURE — 97110 THERAPEUTIC EXERCISES: CPT | Performed by: PHYSICAL THERAPIST

## 2021-04-08 PROCEDURE — 97140 MANUAL THERAPY 1/> REGIONS: CPT | Performed by: PHYSICAL THERAPIST

## 2021-04-08 NOTE — PROGRESS NOTES
Physical Therapy Daily Treatment Note      Patient: Alin Lara   : 1935  Referring practitioner: Tan Miranda MD  Date of Initial Visit: Type: THERAPY  Noted: 2021  Today's Date: 2021  Patient seen for 9 sessions           Subjective Evaluation    History of Present Illness  Mechanism of injury: The pt stated that he has spent more time performing wall slides this week than he has in the past and feels his shoulder pain and motion have both been improved as a result. He stated that his back has been feeling better and has allowed him to increase his activity.    Pain  Current pain ratin  Location: L shoulder           Objective   See Exercise, Manual, and Modality Logs for complete treatment.       Assessment & Plan     Assessment  Assessment details: AROM has been largely stagnant since beginning therapy despite aggressive MT and routine performance of end-range stretches and AROM exercises, so the approach was changed to a modified Reading protocol today to generate increased force from  the deltoids. He did not feel this was as challenging as wall slides but it allowed him to perform higher repetitions through full range which I hope will result in improved mobility. Manual resistance and manual assistance were added to cane flexion at various inclinations to modifiy the exercise to either emphasize ROM or strength and were tolerated well but caused increased fatigue and soreness.     Plan  Plan details: Assess response to Reading protocol and continue as indicated. Return to wall slides and AAROM exercises as needed. Continue MT.         Visit Diagnoses:    ICD-10-CM ICD-9-CM   1. Chronic left shoulder pain  M25.512 719.41    G89.29 338.29   2. Weakness  R53.1 780.79   3. Range of motion deficit  M25.60 719.50       Progress per Plan of Care           Timed:  Manual Therapy:    23     mins  61576;  Therapeutic Exercise:    20     mins  78242;     Neuromuscular Lisbeth:    0     mins  62571;    Therapeutic Activity:     0     mins  06336;     Gait Trainin     mins  68627;     Ultrasound:     0     mins  11254;    Electrical Stimulation:    0     mins  26992 ( );    Untimed:  Electrical Stimulation:    0     mins  95070 ( );  Mechanical Traction:    0     mins  92412;     Timed Treatment:   43   mins   Total Treatment:     43   mins  Jude Bradley PT  Physical Therapist

## 2021-04-19 ENCOUNTER — OFFICE VISIT (OUTPATIENT)
Dept: ORTHOPEDIC SURGERY | Facility: CLINIC | Age: 86
End: 2021-04-19

## 2021-04-19 VITALS
SYSTOLIC BLOOD PRESSURE: 146 MMHG | WEIGHT: 253.53 LBS | DIASTOLIC BLOOD PRESSURE: 68 MMHG | HEIGHT: 73 IN | HEART RATE: 50 BPM | BODY MASS INDEX: 33.6 KG/M2

## 2021-04-19 DIAGNOSIS — M70.61 TROCHANTERIC BURSITIS OF RIGHT HIP: Primary | ICD-10-CM

## 2021-04-19 PROCEDURE — 99214 OFFICE O/P EST MOD 30 MIN: CPT | Performed by: ORTHOPAEDIC SURGERY

## 2021-04-19 NOTE — PROGRESS NOTES
Procedure   Large Joint Arthrocentesis: R greater trochanteric bursa  Date/Time: 4/19/2021 11:02 AM  Consent given by: patient  Site marked: site marked  Timeout: Immediately prior to procedure a time out was called to verify the correct patient, procedure, equipment, support staff and site/side marked as required   Supporting Documentation  Indications: pain   Procedure Details  Location: hip - R greater trochanteric bursa  Preparation: Patient was prepped and draped in the usual sterile fashion  Needle size: 23 G  Approach: lateral  Medications administered: 4 mL ropivacaine 0.5 %; 40 mg triamcinolone acetonide 40 MG/ML  Patient tolerance: patient tolerated the procedure well with no immediate complications

## 2021-04-26 ENCOUNTER — HOSPITAL ENCOUNTER (OUTPATIENT)
Dept: MRI IMAGING | Facility: HOSPITAL | Age: 86
Discharge: HOME | End: 2021-04-26
Admitting: ORTHOPAEDIC SURGERY

## 2021-04-26 DIAGNOSIS — M70.61 TROCHANTERIC BURSITIS OF RIGHT HIP: ICD-10-CM

## 2021-04-26 PROCEDURE — 73721 MRI JNT OF LWR EXTRE W/O DYE: CPT

## 2021-04-28 ENCOUNTER — TREATMENT (OUTPATIENT)
Dept: PHYSICAL THERAPY | Facility: CLINIC | Age: 86
End: 2021-04-28

## 2021-04-28 DIAGNOSIS — M25.60 RANGE OF MOTION DEFICIT: ICD-10-CM

## 2021-04-28 DIAGNOSIS — M25.512 CHRONIC LEFT SHOULDER PAIN: Primary | ICD-10-CM

## 2021-04-28 DIAGNOSIS — G89.29 CHRONIC LEFT SHOULDER PAIN: Primary | ICD-10-CM

## 2021-04-28 DIAGNOSIS — R53.1 WEAKNESS: ICD-10-CM

## 2021-04-28 PROCEDURE — 97140 MANUAL THERAPY 1/> REGIONS: CPT | Performed by: PHYSICAL THERAPIST

## 2021-04-28 PROCEDURE — 97110 THERAPEUTIC EXERCISES: CPT | Performed by: PHYSICAL THERAPIST

## 2021-04-28 NOTE — PROGRESS NOTES
Re-Assessment / Re-Certification      Patient: Alin Lara   : 1935  Diagnosis/ICD-10 Code:  Chronic left shoulder pain [M25.512, G89.29]  Referring practitioner: Tan Miranda MD  Date of Initial Visit: Type: THERAPY  Noted: 2021  Today's Date: 2021  Patient seen for 10 sessions      Subjective:     Subjective Questionnaire: QuickDASH: 16  Clinical Progress: unchanged  Home Program Compliance: Partial  Treatment has included: therapeutic exercise, neuromuscular re-education, manual therapy and therapeutic activity    Subjective Evaluation    History of Present Illness  Mechanism of injury: The pt stated that he had acute worsening of R hip pain appx 2 weeks ago that became severe and limited his ability to walk and stand. He cancelled al PT visits in that time frame due to pain and sought care from Dr. Peña. He had an MRI this week that revealed bursitis, hip OA, and labral tearing. Dr. Peña attempted an injection but the pt could not tolerate it and the procedure was aborted. He stated that his hip pain is slightly reduced but continues to bother him more than his shoulder at this time.     Regarding his shoulder, he stated that he has not been exercising or stretching in the last week due to hip pain. Overall, he is content with his progress and stated he is able to reach across his body, put his hat on, and get clothes from his closet shelves with no pain and decreased effort. Pain remains minimal.       Pain  Current pain ratin  At worst pain ratin  Location: R hip; L shoulder 0/10 currently, 3/10 at worst         Objective          Palpation   Left   No palpable tenderness to the anterior deltoid, biceps, gluteus bryce, gluteus medius, infraspinatus, piriformis, posterior deltoid and supraspinatus.   Tenderness of the latissimus, teres major and teres minor.     Right   No palpable tenderness to the anterior deltoid, biceps, infraspinatus, latissimus, posterior deltoid,  supraspinatus, teres major and teres minor. Tenderness of the gluteus bryce, gluteus medius and piriformis.     Tenderness     Right Hip   Tenderness in the greater trochanter.     Active Range of Motion   Left Shoulder   Flexion: 88 degrees   Abduction: 77 degrees   External rotation 0°: 20 degrees   Internal rotation BTB: sacrum     Right Shoulder   Flexion: 125 degrees   Abduction: 120 degrees   External rotation 0°: 55 degrees   Internal rotation BTB: T9     Passive Range of Motion   Left Shoulder   Flexion: 100 degrees   Abduction: 95 degrees     Strength/Myotome Testing     Left Shoulder     Planes of Motion   Flexion: 4-   Abduction: 4-   External rotation at 0°: 3+   Internal rotation at 0°: 4     Right Shoulder     Planes of Motion   Flexion: 4-   Abduction: 4+   External rotation at 0°: 4   Internal rotation at 0°: 4+     Left Hip   Planes of Motion   Adduction: 4  External rotation: 4    Right Hip   Planes of Motion   Adduction: 4-  External rotation: 4-      Assessment & Plan     Assessment  Impairments: abnormal muscle firing, abnormal or restricted ROM, impaired physical strength, lacks appropriate home exercise program and pain with function  Assessment details: The patient has not seen much objective improvement in regards to the L shoulder since his last reassessment but treatment has been limited by visit cancellations due to new onset of severe R sided hip pain. He may be reaching the plateau of L shoulder function but I feel he has some room to progress with AROM if he becomes aggressive and consistent with his stretches. Strength will likely continue to improve with exercise and is not a large concern of mine at the moment, but I would like to transition to a more intentional strengthening program in the near future. He is more concerned with his R hip at the moment and I obtained a new PT order from Dr. Peña to address this in the clinic. He has classic signs of greater trochanteric bursitis  and was prescribed an HEP for isometric and eccentric glute activation along with stretches for the lateral hip. He was encouraged to ice 2x/day and to use Voltaren cream as needed. I anticipate that he will improve with rehab but he may benefit from an injection if he is able to tolerate this. He was appreciative of the advice and is eager to begin treatment.   Prognosis: good  Functional Limitations: carrying objects, lifting, reaching behind back, reaching overhead and unable to perform repetitive tasks  Goals  Plan Goals: Short Term Goals (4 weeks):     1. The patient will be independent and compliant with initial HEP. Met    2. The patient will report pain at rest 0/10 or less and worst pain 0/10 or less. Ongoing     3. The patient will display decreased TTP in the anterior and superior shoulder and dec mm tension in the surrounding musculature. Partially met    4.  L shoulder AROM will improve to flex 95 deg, abd 90 deg, ER 15 deg, IR L5 deg. Ongoing    5. The patient will demonstrate inc strength evidenced by MMT as follows: flex 4-/5, abd 4-/5, ER 4-/5, and IR 4/5. Ongoing     6. Quick DASH will improve by 11 points or more. Ongoing        Long Term Goals (8 weeks):     1. The patient will be appropriate for independent management and compliant with progressed HEP. Ongoing     2. The patient will report pain at rest 0/10 or less and worst pain 0/10 or less. Ongoing     3. L shoulder AROM will be flex 110, abd 105, ER 20, and IR L3. Ongoing     4. The patient will return to work duties and/or ADLs with no limitations due to shoulder pain or dysfunction. Ongoing     5. The patient will return to recreational and community activities with no limitations due to shoulder pain or dysfunction. Ongoing       Plan  Therapy options: will be seen for skilled physical therapy services  Planned modality interventions: cryotherapy, iontophoresis, TENS, electrical stimulation/Russian stimulation and thermotherapy  (hydrocollator packs)  Planned therapy interventions: ADL retraining, body mechanics training, flexibility, functional ROM exercises, home exercise program, joint mobilization, manual therapy, neuromuscular re-education, postural training, soft tissue mobilization, strengthening, therapeutic activities and stretching  Frequency: 2x week  Duration in visits: 16  Duration in weeks: 8  Treatment plan discussed with: patient  Plan details: Modify HEP for the shoulder to a Reading protocol with aggressive stretches. Continue to address the R hip with isometrics and eccentrics as tolerated.         Visit Diagnoses:    ICD-10-CM ICD-9-CM   1. Chronic left shoulder pain  M25.512 719.41    G89.29 338.29   2. Weakness  R53.1 780.79   3. Range of motion deficit  M25.60 719.50       Progress toward previous goals: Partially Met    PT Signature: Jude Bradley PT      Based upon review of the patient's progress and continued therapy plan, it is my medical opinion that Alin Lara should continue physical therapy treatment at [facility]  [facility] [address] [facility] [id number] [phone].    Signature: __________________________________  Tan Miranda MD    Timed:  Manual Therapy:    10     mins  94603;  Therapeutic Exercise:    30     mins  31855;     Neuromuscular Lisbeth:    0    mins  77511;    Therapeutic Activity:     0     mins  64030;     Gait Trainin     mins  38996;     Ultrasound:     0     mins  22224;    Electrical Stimulation:    0     mins  83790 ( );    Untimed:  Electrical Stimulation:    0     mins  91586 ( );  Mechanical Traction:    0     mins  58077;     Timed Treatment:   40   mins   Total Treatment:     40   mins

## 2021-05-05 ENCOUNTER — TREATMENT (OUTPATIENT)
Dept: PHYSICAL THERAPY | Facility: CLINIC | Age: 86
End: 2021-05-05

## 2021-05-05 DIAGNOSIS — M25.60 RANGE OF MOTION DEFICIT: ICD-10-CM

## 2021-05-05 DIAGNOSIS — R53.1 WEAKNESS: ICD-10-CM

## 2021-05-05 DIAGNOSIS — M25.512 CHRONIC LEFT SHOULDER PAIN: Primary | ICD-10-CM

## 2021-05-05 DIAGNOSIS — G89.29 CHRONIC LEFT SHOULDER PAIN: Primary | ICD-10-CM

## 2021-05-05 PROCEDURE — 97140 MANUAL THERAPY 1/> REGIONS: CPT | Performed by: PHYSICAL THERAPIST

## 2021-05-05 PROCEDURE — 97110 THERAPEUTIC EXERCISES: CPT | Performed by: PHYSICAL THERAPIST

## 2021-05-05 NOTE — PROGRESS NOTES
Physical Therapy Daily Treatment Note      Patient: Alin Lara   : 1935  Referring practitioner: Tan Miranda MD  Date of Initial Visit: Type: THERAPY  Noted: 2021  Today's Date: 2021  Patient seen for 11 sessions           Subjective Evaluation    History of Present Illness  Mechanism of injury: The pt stated that his R hip pain has not changed at all. He is eager to meet with ortho again now that he has had his MRI. He feels he is getting some temporary relief from ice and Voltaren cream but does not feel his HEP is helping. Pain is worse when he initially gets up out of bed and with prolonged walking and he is frustrated with having to reduce his activity. He reported a new onset of R knee pain in the last couple of weeks as well. In regards to his shoulder he stated he has no complaints and his is pleased with his progress.     Pain  Current pain ratin  Location: R hip, L shoulder (0/10)           Objective          Active Range of Motion   Left Shoulder   Flexion: 91 degrees     Additional Active Range of Motion Details  AAROM L shoulder flexion 115 deg in supine with cane        See Exercise, Manual, and Modality Logs for complete treatment.       Assessment & Plan     Assessment  Assessment details: The pt has not seen improvement with his HEP for isometric glute strengthening and lateral hip stretching but was encouraged to continue regular performance as it has only been one week since it was implemented. He is pleased with his shoulder and demonstrated 90 deg of AROM flexion for the first time today. In supine he was able to achieve 115 degrees with cane assist and he reported liking this exercise. As a result, I modified his HEP for his shoulder rehab to include only cane exercises to increase compliance and to further enhance his ROM.     Plan  Plan details: Continue progressions of cane AROM exercises and deltoid strengthening. MT for the hip.         Visit  Diagnoses:    ICD-10-CM ICD-9-CM   1. Chronic left shoulder pain  M25.512 719.41    G89.29 338.29   2. Weakness  R53.1 780.79   3. Range of motion deficit  M25.60 719.50       Progress per Plan of Care           Timed:  Manual Therapy:    18     mins  51057;  Therapeutic Exercise:    23     mins  80693;     Neuromuscular Lisbeth:    0    mins  97922;    Therapeutic Activity:     0     mins  15000;     Gait Trainin     mins  07282;     Ultrasound:     0     mins  38038;    Electrical Stimulation:    0     mins  28969 ( );    Untimed:  Electrical Stimulation:    0     mins  77900 ( );  Mechanical Traction:    0     mins  95856;     Timed Treatment:   41   mins   Total Treatment:     41   mins  Jude Bradley PT  Physical Therapist

## 2021-05-10 ENCOUNTER — TELEPHONE (OUTPATIENT)
Dept: ORTHOPEDIC SURGERY | Facility: CLINIC | Age: 86
End: 2021-05-10

## 2021-05-10 ENCOUNTER — OFFICE VISIT (OUTPATIENT)
Dept: ORTHOPEDIC SURGERY | Facility: CLINIC | Age: 86
End: 2021-05-10

## 2021-05-10 VITALS
HEIGHT: 73 IN | BODY MASS INDEX: 30.39 KG/M2 | SYSTOLIC BLOOD PRESSURE: 149 MMHG | WEIGHT: 229.28 LBS | DIASTOLIC BLOOD PRESSURE: 82 MMHG | HEART RATE: 53 BPM

## 2021-05-10 DIAGNOSIS — M70.61 TROCHANTERIC BURSITIS OF RIGHT HIP: Primary | ICD-10-CM

## 2021-05-10 PROCEDURE — 99214 OFFICE O/P EST MOD 30 MIN: CPT | Performed by: ORTHOPAEDIC SURGERY

## 2021-05-10 NOTE — PROGRESS NOTES
[facility] [place] [facility] [place]    Subjective     Chief Complaint   Patient presents with   • Follow-up     Right hip MRI follow up. MRI done on 4-26-21        HPI    Alin Lara is a 85 y.o. male who follows up after obtaining a right hip MRI review. He states he had a rough day yesterday as well as a rough night last night with right hip pain. He is better today after applying diclofenac gel to the right hip. Overall, his right hip pain has been ongoing for a couple of months. The patient had a failed corticosteroid injection in the recent past and notes he was able to walk without a cane. The pain is located on the lateral and posterior aspects of the right hip. He states that at a baseball game on 05/08/2021, he took a bad step and believes this may have exacerbated the pain. He notes that yesterday, he was unable to walk. They went to his daughter's house for Mother's Day and he had a difficult time sitting and driving. Rufino denies any groin pain. The patient is seeing a physical therapist for his shoulder.    The patient currently takes Xarelto and was placed on this after his knee surgery.    He denies a history of diabetes. He just had lab work that revealed high cholesterol.    I have reviewed the following portions of the patient's history and agree with: History of Present Illness and Review of Systems    Patient Active Problem List   Diagnosis   • Paroxysmal atrial fibrillation (CMS/HCC)   • Basal cell carcinoma of skin   • Impotence of organic origin   • Gastroesophageal reflux disease   • Hyperlipidemia   • Hypertension   • Persistent insomnia   • Knee pain   • Osteoarthritis of lumbar spine   • Adiposity   • Overactive bladder   • Arthralgia of hip   • Impaired glucose tolerance   • Prostatism   • History of pulmonary embolism (CMS/HCC)   • Inflammation of sacroiliac joint (CMS/HCC)   • Generalized osteoarthritis   • Preventative health care   • Vertigo   • TIA (transient ischemic  attack)   • Lymphocytic colitis   • Esophageal stricture   • Left shoulder pain   • Primary localized osteoarthrosis of left shoulder region   • Contracture of joint of left shoulder region   • Biceps tendinitis of left upper extremity   • Status post reverse total shoulder replacement, left   • Leukocytosis, likely reactive   • Acute postoperative pain     Past Medical History:   Diagnosis Date   • Basal cell carcinoma    • Blood loss     post op   • ED (erectile dysfunction)     Responsive to Viagra   • Failed total knee, right (CMS/HCC)    • Fasting hypoglycemia 2016    Hemoglobin A1c normal   • Generalized osteoarthritis    • GERD (gastroesophageal reflux disease)     History esophageal stricture   • History of blood clots    • HNP (herniated nucleus pulposus), lumbar 1988, 2014    L5 L6   • Hyperglycemia     not diabetic, pt states hes never has high blood sugar per pt    • Hyperlipidemia    • Hypertension    • Microscopic colitis 2017    Positive biopsy - colonoscopy   • OAB (overactive bladder) 2000    Persistent urgency   • Obesity Adulthood    2012 body weight 244 BMI 32   • Painful total knee replacement (CMS/[facility])    • Paroxysmal atrial fibrillation (CMS/[facility]) 1991    one episode - cardioverted   • Prostatism 2014   • Pulmonary embolism (CMS/[facility]) 1991    after injury and protracted immobilization   • Right knee DJD 2015    Partial knee arthroplasty   • Shoulder dislocation     Repeated episodes   • Sleep disturbances 1990    Frequently requires medication   • Transient cerebral ischemia 2010, 2016    Negative medical workup on both occasions   • Venous stasis    • Vertigo    • Wears glasses       Past Surgical History:   Procedure Laterality Date   • CARDIOVERSION  1991    Atrial fibrillation   • COLONOSCOPY      2018   • KNEE ARTHROPLASTY Right 2015    Partial    • KNEE ARTHROPLASTY Left    • KNEE ARTHROSCOPY Right 2015    Failed procedure   • LUMBAR DISC SURGERY  2014    Surgery ×2 Ruptured disc   •  LUMBAR DISCECTOMY      L5 L6    • SHOULDER SURGERY     • TOTAL SHOULDER ARTHROPLASTY W/ DISTAL CLAVICLE EXCISION Left 10/19/2020    Procedure: TOTAL SHOULDER REVERSE ARTHROPLASTY LEFT;  Surgeon: Tan Miranda MD;  Location: [facility];  Service: Orthopedics;  Laterality: Left;      Family History   Problem Relation Age of Onset   • Dementia Mother          age 94   • Other Father          age 86 of unknown cause   • Rheum arthritis Father    • Coronary artery disease Brother    • No Known Problems Brother    • Sick sinus syndrome Other         Has pacemaker   • Diabetes Paternal Grandmother      Social History     Socioeconomic History   • Marital status:      Spouse name: Not on file   • Number of children: 2   • Years of education: Not on file   • Highest education level: Not on file   Tobacco Use   • Smoking status: Never Smoker   • Smokeless tobacco: Former User     Types: Chew   Substance and Sexual Activity   • Alcohol use: Yes     Alcohol/week: 1.0 standard drinks     Types: 1 Cans of beer per week     Comment: Occasionally   • Drug use: No   • Sexual activity: Yes     Partners: Female     Comment: Monogamous      Current Outpatient Medications on File Prior to Visit   Medication Sig Dispense Refill   • ascorbic acid (VITAMIN C) 1000 MG tablet Take 1 capsule by mouth Daily.     • atenolol (TENORMIN) 50 MG tablet TAKE 1 TABLET BY MOUTH DAILY. 90 tablet 1   • CALCIUM-MAGNESIUM-ZINC PO Take 1 tablet by mouth Daily.     • Cholecalciferol (VITAMIN D-3) 25 MCG (1000 UT) capsule Take  by mouth.     • Coenzyme Q10 200 MG capsule Take 200 mg by mouth Daily. 90 capsule 1   • docusate sodium (Colace) 100 MG capsule Take 1 capsule by mouth 2 (Two) Times a Day. 60 capsule 0   • Multiple Vitamins-Minerals (CENTRUM ADULTS) tablet Take 1-2 tablets by mouth daily.     • oxybutynin XL (DITROPAN-XL) 5 MG 24 hr tablet Take 1 tablet by mouth Every 12 (Twelve) Hours. (Patient taking differently: Take 5 mg  "by mouth every night at bedtime.) 180 tablet 1   • rivaroxaban (XARELTO) 20 MG tablet Take 1 tablet by mouth Daily. Resume 10/22/2020 30 tablet    • Ropivacine HCl-NaCl (NAROPIN) 12 mg/hr by Peripheral Nerve route Continuous.     • sildenafil (VIAGRA) 100 MG tablet Take 0.5-1 tablets by mouth as needed.     • tamsulosin (FLOMAX) 0.4 MG capsule 24 hr capsule TAKE 1 CAPSULE EVERY 12 HOURS (Patient taking differently: 2 capsules every night at bedtime.) 180 capsule 1   • Zinc 50 MG capsule Take  by mouth.       No current facility-administered medications on file prior to visit.      Allergies   Allergen Reactions   • Pravastatin Other (See Comments)     Nocturnal leg cramps   • Atorvastatin      Fatigue   • Qsymia [Phentermine-Topiramate] Mental Status Change   • Topiramate Myalgia        Review of Systems   Constitutional: Negative.    HENT: Negative.    Eyes: Negative.    Respiratory: Negative.    Cardiovascular: Negative.    Gastrointestinal: Negative.    Endocrine: Negative.    Genitourinary: Negative.    Musculoskeletal: Positive for arthralgias.   Skin: Negative.    Allergic/Immunologic: Negative.    Neurological: Negative.    Hematological: Negative.    Psychiatric/Behavioral: Negative.         Objective      Physical Exam  /82   Pulse 53   Ht 185.4 cm (72.99\")   Wt 104 kg (229 lb 4.5 oz)   BMI 30.26 kg/m²     Body mass index is 30.26 kg/m².    General:   Mental Status:  Alert   Appearance: Cooperative, in no acute distress   Build and Nutrition: Well-nourished well-developed male   Orientation: Alert and oriented to person, place and time   Posture: Normal   Gait: Slow    Integument       • Right hip: No skin lesions, rash, or ecchymosis.    Lower Extremities  • Right Hip:        • Tenderness: Lateral tenderness over greater trochanter       • Swelling: None       • Crepitus: None       • Atrophy: None       • Range of motion:             • External rotation: 30°             • Internal rotation: " 30°             • Flexion: 100°             • Extension: 0°       • Instability: None       • Deformities: None       • Functional Testing:             • Stinchfield Test: Negative             • No leg length discrepancy.    Imaging/Studies  MRI Hip Right Without Contrast (04/26/2021 10:32)  I personally reviewed the MRI images and I agree with the results.       Assessment and Plan     Diagnoses and all orders for this visit:    1. Trochanteric bursitis of right hip (Primary)        1. Trochanteric bursitis of right hip        I discussed the patient's MRI findings with him today. I have advised him that I believe a significant amount of his pain is due to inflammation in the soft tissues, but he does not have a significant bursal fluid collection, and it does not appear that the pain is coming from his hip joint. He is currently taking Xarelto. Therefore, we are unable to prescribe him an anti-inflammatory for his pain. He will continue working with physical therapy with local modalities as appropriate. He will continue to use the diclofenac gel for knee pain.       Return in about 4 weeks (around 6/7/2021).    Scribed for Salo Peña MD by Michelle Bustillo.  05/10/21   17:38 EDT    I have personally performed the services described in this document as scribed by the above individual, and it is both accurate and complete.  Salo Peña MD  5/11/2021  12:57 EDT

## 2021-05-10 NOTE — TELEPHONE ENCOUNTER
Left message for the patient to call us back to make an appt for today with Dr. Peña for a MRI follow up per Dr. Miranda.    Nilda Brady

## 2021-05-14 ENCOUNTER — TREATMENT (OUTPATIENT)
Dept: PHYSICAL THERAPY | Facility: CLINIC | Age: 86
End: 2021-05-14

## 2021-05-14 DIAGNOSIS — M25.60 RANGE OF MOTION DEFICIT: ICD-10-CM

## 2021-05-14 DIAGNOSIS — G89.29 CHRONIC LEFT SHOULDER PAIN: Primary | ICD-10-CM

## 2021-05-14 DIAGNOSIS — R53.1 WEAKNESS: ICD-10-CM

## 2021-05-14 DIAGNOSIS — M25.512 CHRONIC LEFT SHOULDER PAIN: Primary | ICD-10-CM

## 2021-05-14 PROCEDURE — 97110 THERAPEUTIC EXERCISES: CPT | Performed by: PHYSICAL THERAPIST

## 2021-05-14 PROCEDURE — 97035 APP MDLTY 1+ULTRASOUND EA 15: CPT | Performed by: PHYSICAL THERAPIST

## 2021-05-17 ENCOUNTER — TREATMENT (OUTPATIENT)
Dept: PHYSICAL THERAPY | Facility: CLINIC | Age: 86
End: 2021-05-17

## 2021-05-17 DIAGNOSIS — R53.1 WEAKNESS: ICD-10-CM

## 2021-05-17 DIAGNOSIS — M25.512 CHRONIC LEFT SHOULDER PAIN: Primary | ICD-10-CM

## 2021-05-17 DIAGNOSIS — M25.60 RANGE OF MOTION DEFICIT: ICD-10-CM

## 2021-05-17 DIAGNOSIS — G89.29 CHRONIC LEFT SHOULDER PAIN: Primary | ICD-10-CM

## 2021-05-17 PROCEDURE — 97110 THERAPEUTIC EXERCISES: CPT | Performed by: PHYSICAL THERAPIST

## 2021-05-17 PROCEDURE — 97035 APP MDLTY 1+ULTRASOUND EA 15: CPT | Performed by: PHYSICAL THERAPIST

## 2021-05-17 NOTE — PROGRESS NOTES
Physical Therapy Daily Treatment Note      Patient: Alin Lara   : 1935  Referring practitioner: Tan Miranda MD  Date of Initial Visit: Type: THERAPY  Noted: 2021  Today's Date: 2021  Patient seen for 13 sessions           Subjective Evaluation    History of Present Illness  Mechanism of injury: The pt stated that his hip has felt better since his last visit and he attributes it to ultrasound. He has been moire compliant with his HEP than he was in the last couple of weeks and feels that the stretches have helped. He has discontinued exercises for the shoulder and stated he is pleased with his pain and function.    Pain  Current pain ratin  Location: R hip           Objective          Active Range of Motion   Left Shoulder   Flexion: 90 degrees   Abduction: 85 degrees       See Exercise, Manual, and Modality Logs for complete treatment.       Assessment & Plan     Assessment  Assessment details: The pt reported reduced pain in the hip this morning and seems to have responded favorably to his new HEP and to ultrasound application. His HEP exercises were reviewed and US was repeated in the clinic today. Several glute isometrics and eccentrics were performed at low levels today but pt intolerance to supine and SL made finding appropriate positions difficult. He is content with the progress he has made with his L shoulder and has largely discontinued home therapy in the last few weeks. He was advised to resume stretching and AROM exercises to maintain and improve ROM and total function.    Plan  Plan details: Repeat US and glute isometrics and eccentrics.        Visit Diagnoses:    ICD-10-CM ICD-9-CM   1. Chronic left shoulder pain  M25.512 719.41    G89.29 338.29   2. Weakness  R53.1 780.79   3. Range of motion deficit  M25.60 719.50       Progress per Plan of Care           Timed:  Manual Therapy:    0     mins  12612;  Therapeutic Exercise:    302  mins  17037;     Neuromuscular  Lisbeth:    0    mins  92027;    Therapeutic Activity:     0     mins  20238;     Gait Trainin     mins  62311;     Ultrasound:     8     mins  17057;    Electrical Stimulation:    0     mins  21655 ( );    Untimed:  Electrical Stimulation:    0     mins  52682 ( );  Mechanical Traction:    0     mins  31942;     Timed Treatment:   40   mins   Total Treatment:     40   mins  Jude Bradley PT  Physical Therapist

## 2021-05-18 ENCOUNTER — OFFICE VISIT (OUTPATIENT)
Dept: ORTHOPEDIC SURGERY | Facility: CLINIC | Age: 86
End: 2021-05-18

## 2021-05-18 VITALS
SYSTOLIC BLOOD PRESSURE: 105 MMHG | HEART RATE: 141 BPM | BODY MASS INDEX: 30.39 KG/M2 | DIASTOLIC BLOOD PRESSURE: 90 MMHG | WEIGHT: 229.28 LBS | HEIGHT: 73 IN

## 2021-05-18 DIAGNOSIS — Z96.612 STATUS POST REVERSE TOTAL SHOULDER REPLACEMENT, LEFT: Primary | ICD-10-CM

## 2021-05-18 DIAGNOSIS — M19.012 PRIMARY LOCALIZED OSTEOARTHROSIS OF LEFT SHOULDER REGION: ICD-10-CM

## 2021-05-18 PROCEDURE — 99212 OFFICE O/P EST SF 10 MIN: CPT | Performed by: ORTHOPAEDIC SURGERY

## 2021-05-18 NOTE — PROGRESS NOTES
[facility] [facility] Office Follow Up       Office Follow Up Visit       Patient Name: Alin Lara    Chief Complaint:   Chief Complaint   Patient presents with   • Left Shoulder - Follow-up     3 month f/u, 7 month  s/p Total Shoulder Reverse Arthroplasty Left 10/19/20       Referring Physician: No ref. provider found    History of Present Illness:   It has been 3  month(s) since Alin Lara's last visit. Alin Lara returns to clinic today for F/U: postoperative follow-up of leftBody Part: shoulderReason: arthroplasty. Junior Lara rates HIS/HER: hispain at 3/10 on the pain scale. Previous/current treatments: NSAIDS. Current symptoms:Symptoms: none. . Overall, he/she: heis doing better.  I have reviewed the patient's history of present illness as noted/entered above.    I have reviewed the patient's past medical history, surgical history, social history, family history, medications, and allergies as noted in the electronic medical record and as noted/entered.  I have reviewed the patient's review of systems as noted/enter and updated as noted in the patient's HPI.    Improvements noted    Left shoulder 7 months status post reverse shoulder arthroplasty for severe B3 glenoid    Prior history:  Date of surgery 10/19/2020 left complex reverse shoulder replacement with augmented glenoid component for severe B3 glenoid     Home health physical therapy  4 months postop     Saw Dr. Zamora for left venous insufficiency     Enjoyed HH PT with [facility] and did very well, his is interested in Outpatient PT at [facility]         Subjective   Subjective      Review of Systems   Constitutional: Negative.  Negative for chills, fatigue and fever.   HENT: Negative.  Negative for congestion and dental problem.    Eyes: Negative.  Negative for blurred vision.   Respiratory: Negative.  Negative for shortness of breath.    Cardiovascular: Negative.   Negative for leg swelling.   Gastrointestinal: Negative.  Negative for abdominal pain.   Endocrine: Negative.  Negative for polyuria.   Genitourinary: Negative.  Negative for difficulty urinating.   Musculoskeletal: Positive for arthralgias.   Skin: Negative.    Allergic/Immunologic: Negative.    Neurological: Negative.    Hematological: Negative.  Negative for adenopathy.   Psychiatric/Behavioral: Negative.  Negative for behavioral problems.        Past Medical History:   Past Medical History:   Diagnosis Date   • Basal cell carcinoma    • Blood loss     post op   • ED (erectile dysfunction)     Responsive to Viagra   • Failed total knee, right (CMS/HCC)    • Fasting hypoglycemia 2016    Hemoglobin A1c normal   • Generalized osteoarthritis    • GERD (gastroesophageal reflux disease)     History esophageal stricture   • History of blood clots    • HNP (herniated nucleus pulposus), lumbar 1988, 2014    L5 L6   • Hyperglycemia     not diabetic, pt states hes never has high blood sugar per pt    • Hyperlipidemia    • Hypertension    • Microscopic colitis 2017    Positive biopsy - colonoscopy   • OAB (overactive bladder) 2000    Persistent urgency   • Obesity Adulthood    2012 body weight 244 BMI 32   • Painful total knee replacement (CMS/[facility])    • Paroxysmal atrial fibrillation (CMS/HCC) 1991    one episode - cardioverted   • Prostatism 2014   • Pulmonary embolism (CMS/HCC) 1991    after injury and protracted immobilization   • Right knee DJD 2015    Partial knee arthroplasty   • Shoulder dislocation     Repeated episodes   • Sleep disturbances 1990    Frequently requires medication   • Transient cerebral ischemia 2010, 2016    Negative medical workup on both occasions   • Venous stasis    • Vertigo    • Wears glasses        Past Surgical History:   Past Surgical History:   Procedure Laterality Date   • CARDIOVERSION  1991    Atrial fibrillation   • COLONOSCOPY      2018   • KNEE ARTHROPLASTY Right 2015    Partial    •  KNEE ARTHROPLASTY Left    • KNEE ARTHROSCOPY Right 2015    Failed procedure   • LUMBAR DISC SURGERY  2014    Surgery ×2 Ruptured disc   • LUMBAR DISCECTOMY      L5 L6    • SHOULDER SURGERY     • TOTAL SHOULDER ARTHROPLASTY W/ DISTAL CLAVICLE EXCISION Left 10/19/2020    Procedure: TOTAL SHOULDER REVERSE ARTHROPLASTY LEFT;  Surgeon: Tan Miranda MD;  Location: [facility];  Service: Orthopedics;  Laterality: Left;       Family History:   Family History   Problem Relation Age of Onset   • Dementia Mother          age 94   • Other Father          age 86 of unknown cause   • Rheum arthritis Father    • Coronary artery disease Brother    • No Known Problems Brother    • Sick sinus syndrome Other         Has pacemaker   • Diabetes Paternal Grandmother        Social History:   Social History     Socioeconomic History   • Marital status:      Spouse name: Not on file   • Number of children: 2   • Years of education: Not on file   • Highest education level: Not on file   Tobacco Use   • Smoking status: Never Smoker   • Smokeless tobacco: Former User     Types: Chew   Substance and Sexual Activity   • Alcohol use: Yes     Alcohol/week: 1.0 standard drinks     Types: 1 Cans of beer per week     Comment: Occasionally   • Drug use: No   • Sexual activity: Yes     Partners: Female     Comment: Monogamous       Medications:   Current Outpatient Medications:   •  ascorbic acid (VITAMIN C) 1000 MG tablet, Take 1 capsule by mouth Daily., Disp: , Rfl:   •  atenolol (TENORMIN) 50 MG tablet, TAKE 1 TABLET BY MOUTH DAILY., Disp: 90 tablet, Rfl: 1  •  CALCIUM-MAGNESIUM-ZINC PO, Take 1 tablet by mouth Daily., Disp: , Rfl:   •  Cholecalciferol (VITAMIN D-3) 25 MCG (1000 UT) capsule, Take  by mouth., Disp: , Rfl:   •  Coenzyme Q10 200 MG capsule, Take 200 mg by mouth Daily., Disp: 90 capsule, Rfl: 1  •  docusate sodium (Colace) 100 MG capsule, Take 1 capsule by mouth 2 (Two) Times a Day., Disp: 60 capsule, Rfl: 0  •   "Multiple Vitamins-Minerals (CENTRUM ADULTS) tablet, Take 1-2 tablets by mouth daily., Disp: , Rfl:   •  oxybutynin XL (DITROPAN-XL) 5 MG 24 hr tablet, Take 1 tablet by mouth Every 12 (Twelve) Hours. (Patient taking differently: Take 5 mg by mouth every night at bedtime.), Disp: 180 tablet, Rfl: 1  •  rivaroxaban (XARELTO) 20 MG tablet, Take 1 tablet by mouth Daily. Resume 10/22/2020, Disp: 30 tablet, Rfl:   •  Ropivacine HCl-NaCl (NAROPIN), 12 mg/hr by Peripheral Nerve route Continuous., Disp:  , Rfl:   •  sildenafil (VIAGRA) 100 MG tablet, Take 0.5-1 tablets by mouth as needed., Disp: , Rfl:   •  tamsulosin (FLOMAX) 0.4 MG capsule 24 hr capsule, TAKE 1 CAPSULE EVERY 12 HOURS (Patient taking differently: 2 capsules every night at bedtime.), Disp: 180 capsule, Rfl: 1  •  Zinc 50 MG capsule, Take  by mouth., Disp: , Rfl:     Allergies:   Allergies   Allergen Reactions   • Pravastatin Other (See Comments)     Nocturnal leg cramps   • Atorvastatin      Fatigue   • Qsymia [Phentermine-Topiramate] Mental Status Change   • Topiramate Myalgia       The following portions of the patient's history were reviewed and updated as appropriate: allergies, current medications, past family history, past medical history, past social history, past surgical history and problem list.        Objective    Objective      Vital Signs:   Vitals:    05/18/21 0807   BP: 105/90   Pulse: (!) 141   Weight: 104 kg (229 lb 4.5 oz)   Height: 185.4 cm (72.99\")       Ortho Exam:  LEFT   Tachycardia --atrial fibrillation --call Dr. Mohamud Grider spoke by phone and he will see him in the clinic tomorrow  Asymptomatic no chest pain  Left shoulder no pain with active range of motion or passive range of motion  Excellent strength    Results Review:  Imaging Results (Last 24 Hours)     Procedure Component Value Units Date/Time    XR Shoulder 2+ View Left [[id number]] Resulted: 05/18/21 0824     Updated: 05/18/21 0825    Narrative:      Imaging: shoulder x-rays " 3 views - AP, axillary, and scapular-Y x-ray   views    Side: LEFT    Indication for shoulder x-ray 3 views: shoulder pain    Comparison: Postoperative/clinic imaging    Findings: No acute bony pathology. Implants well appearing and well   positioned after shoulder replacement.  Located and no fracture noted.    Left shoulder 3 views show a posteriorly augmented reverse shoulder   arthroplasty for diagnosis of B3 glenoid.  No acute bony findings noted   implants appear stable compared to prior.    I personally reviewed the above x-rays and discussed with the patient.            MRI Hip Right Without Contrast    Result Date: 4/27/2021  Limited field-of-view with right lateral hip soft tissues partially included demonstrating edema in the subcutaneous region may be associated with injection or inflammation along with increased signal adjacent to the greater trochanter region concerning for trochanteric bursitis without focal fluid collection. Asymmetric DJD of the right hip of at least moderate involvement and osteophytosis anterior-superior complex acetabular labral tear with adjacent osteophytosis and shallow acetabular roof combination of findings with joint space narrowing, however, no acute fracture. Labral tearing has noted probable hyaline cartilage interface extension.   D:  04/27/2021 E:  04/27/2021  This report was finalized on 4/27/2021 6:54 PM by Dr. Aleksey Valerio.          Procedures            Assessment / Plan      Assessment/Plan:   Problem List Items Addressed This Visit        Musculoskeletal and Injuries    Primary localized osteoarthrosis of left shoulder region    Status post reverse total shoulder replacement, left - Primary    Relevant Orders    XR Shoulder 2+ View Left (Completed)          Left reverse shoulder arthroplasty doing very well now 7 months postop  Cardiology he will see Dr. Mohamud Grider tomorrow in the office he is asymptomatic  Known history of atrial fibrillation    Follow Up: 5  months with left shoulder 3 views, 1 year follow-up      Tan Miranda MD, FAAOS  Orthopedic Surgeon  Fellowship Trained Shoulder and Elbow Surgeon  [facility]  Orthopedics and Sports Medicine  [address] [place] [address], [address]  [place] [address]    05/18/21  08:40 EDT    Please note that portions of this note may have been completed with a voice recognition program. Efforts were made to edit the dictations, but occasionally words are mistranscribed.

## 2021-05-24 ENCOUNTER — TREATMENT (OUTPATIENT)
Dept: PHYSICAL THERAPY | Facility: CLINIC | Age: 86
End: 2021-05-24

## 2021-05-24 DIAGNOSIS — G89.29 CHRONIC LEFT SHOULDER PAIN: Primary | ICD-10-CM

## 2021-05-24 DIAGNOSIS — M25.512 CHRONIC LEFT SHOULDER PAIN: Primary | ICD-10-CM

## 2021-05-24 DIAGNOSIS — R53.1 WEAKNESS: ICD-10-CM

## 2021-05-24 DIAGNOSIS — M25.60 RANGE OF MOTION DEFICIT: ICD-10-CM

## 2021-05-24 PROCEDURE — 97110 THERAPEUTIC EXERCISES: CPT | Performed by: PHYSICAL THERAPIST

## 2021-05-24 NOTE — PROGRESS NOTES
Physical Therapy Daily Treatment Note      Patient: Alin Lara   : 1935  Referring practitioner: Tan Miranda MD  Date of Initial Visit: Type: THERAPY  Noted: 2021  Today's Date: 2021  Patient seen for 14 sessions           Subjective Evaluation    History of Present Illness  Mechanism of injury: The pt stated that his hip has been feeling better and he believes treatment is working. He requested US again today. He believes he is walking better but does not feel back to normal. He had Afib last week and will be following up with his cardiologist in the near future.     Pain  Current pain ratin  Location: R hip            Objective   See Exercise, Manual, and Modality Logs for complete treatment.       Assessment & Plan     Assessment  Assessment details: The pt reported reduced hip pain following US last week so it was repeated again today. The pressure from the procedure irritated the hip during the treatment but he reported decreased pain afterwards. He was challenged with several light glute strengthening exercises which were tolerated well with no complaints of pain. He still walks gingerly on the L side and is slow with WB motions. He became tired with exercise and requested to stop treatment due to fatigue.    Plan  Plan details: Continue US and glute strengthening exercises as tolerated.        Visit Diagnoses:    ICD-10-CM ICD-9-CM   1. Chronic left shoulder pain  M25.512 719.41    G89.29 338.29   2. Weakness  R53.1 780.79   3. Range of motion deficit  M25.60 719.50       Progress per Plan of Care           Timed:  Manual Therapy:    0     mins  56696;  Therapeutic Exercise:    26     mins  05973;     Neuromuscular Lisbeth:    0    mins  34081;    Therapeutic Activity:     0     mins  08415;     Gait Trainin     mins  26463;     Ultrasound:     0     mins  99941;    Electrical Stimulation:    7     mins  99504 ( );    Untimed:  Electrical Stimulation:    0      mins  87354 ( );  Mechanical Traction:    0     mins  91110;     Timed Treatment:   33   mins   Total Treatment:     33   mins  Jude Bradley PT  Physical Therapist

## 2021-05-26 ENCOUNTER — TREATMENT (OUTPATIENT)
Dept: PHYSICAL THERAPY | Facility: CLINIC | Age: 86
End: 2021-05-26

## 2021-05-26 DIAGNOSIS — M25.512 CHRONIC LEFT SHOULDER PAIN: Primary | ICD-10-CM

## 2021-05-26 DIAGNOSIS — M25.60 RANGE OF MOTION DEFICIT: ICD-10-CM

## 2021-05-26 DIAGNOSIS — R53.1 WEAKNESS: ICD-10-CM

## 2021-05-26 DIAGNOSIS — G89.29 CHRONIC LEFT SHOULDER PAIN: Primary | ICD-10-CM

## 2021-05-26 PROCEDURE — 97140 MANUAL THERAPY 1/> REGIONS: CPT | Performed by: PHYSICAL THERAPIST

## 2021-05-26 PROCEDURE — 97110 THERAPEUTIC EXERCISES: CPT | Performed by: PHYSICAL THERAPIST

## 2021-05-26 PROCEDURE — 97035 APP MDLTY 1+ULTRASOUND EA 15: CPT | Performed by: PHYSICAL THERAPIST

## 2021-05-26 NOTE — PROGRESS NOTES
Physical Therapy Daily Treatment Note      Patient: Alin Lara   : 1935  Referring practitioner: Tan Miranda MD  Date of Initial Visit: Type: THERAPY  Noted: 2021  Today's Date: 2021  Patient seen for 15 sessions           Subjective Evaluation    History of Present Illness  Mechanism of injury: The pt stated that his hip is feeling better every day. He continues to feel US is helping and he is stretching regularly. He is sleeping in a recliner to prevent rolling on the hip but stated it is not waking him up much.     Pain  Current pain rating: 3  Location: R hip           Objective   See Exercise, Manual, and Modality Logs for complete treatment.       Assessment & Plan     Assessment  Assessment details: The pt's hip continues to improve with stretching, isometrics, and ultrasound and he has not had any flare ups of late. Manual therapy was added today and was tolerated well with minimal complaints of discomfort. He has not been compliant with his strengthening exercises at home so they were modified to simple band exercises in an attempt to increase compliance. He was advised to continue stretching regularly and was educated on modified stretching positions in sitting.    Plan  Plan details: Continue US, MT, concentric glute strengthening, and stretching.        Visit Diagnoses:    ICD-10-CM ICD-9-CM   1. Chronic left shoulder pain  M25.512 719.41    G89.29 338.29   2. Weakness  R53.1 780.79   3. Range of motion deficit  M25.60 719.50       Progress per Plan of Care           Timed:  Manual Therapy:    10     mins  52758;  Therapeutic Exercise:    20     mins  56613;     Neuromuscular Lisbeth:    0    mins  16054;    Therapeutic Activity:     0     mins  81800;     Gait Trainin     mins  54692;     Ultrasound:     0     mins  48524;    Electrical Stimulation:    8     mins  70652 ( );    Untimed:  Electrical Stimulation:    0     mins  78243 ( );  Mechanical  Traction:    0     mins  26517;     Timed Treatment:   38   mins   Total Treatment:     38   mins  Jude Bradley PT  Physical Therapist

## 2021-06-03 ENCOUNTER — TREATMENT (OUTPATIENT)
Dept: PHYSICAL THERAPY | Facility: CLINIC | Age: 86
End: 2021-06-03

## 2021-06-03 DIAGNOSIS — G89.29 CHRONIC LEFT SHOULDER PAIN: ICD-10-CM

## 2021-06-03 DIAGNOSIS — M25.60 RANGE OF MOTION DEFICIT: ICD-10-CM

## 2021-06-03 DIAGNOSIS — M25.512 CHRONIC LEFT SHOULDER PAIN: ICD-10-CM

## 2021-06-03 DIAGNOSIS — R53.1 WEAKNESS: Primary | ICD-10-CM

## 2021-06-03 PROCEDURE — 97110 THERAPEUTIC EXERCISES: CPT | Performed by: PHYSICAL THERAPIST

## 2021-06-03 PROCEDURE — 97035 APP MDLTY 1+ULTRASOUND EA 15: CPT | Performed by: PHYSICAL THERAPIST

## 2021-06-03 NOTE — PROGRESS NOTES
Re-Assessment / Re-Certification      Patient: Alin Lara   : 1935  Diagnosis/ICD-10 Code:  No primary diagnosis found.  Referring practitioner: Tan Miranda MD  Date of Initial Visit: Type: THERAPY  Noted: 2021  Today's Date: 6/3/2021  Patient seen for 16 sessions      Subjective:     Subjective Questionnaire: QuickDASH: 11  Clinical Progress: improved  Home Program Compliance: Partial  Treatment has included: therapeutic exercise, neuromuscular re-education, manual therapy, therapeutic activity and ultrasound    Subjective Evaluation    History of Present Illness  Mechanism of injury: The pt stated that his R hip has been feeling much better but does not feel it is quite back to normal. He continues to feel he is walking with a limp. He has been compliant with his stretches but has not performed his exercises due to difficulty using the theraband. He is pleased with the progress with his shoulder and does not feel he needs any more therapy for it.     Pain  Current pain rating: 3  Location: R hip         Objective          Palpation   Left   No palpable tenderness to the anterior deltoid, biceps, gluteus bryce, gluteus medius, infraspinatus, piriformis, posterior deltoid and supraspinatus.   Tenderness of the latissimus, teres major and teres minor.     Right   No palpable tenderness to the anterior deltoid, biceps, infraspinatus, latissimus, posterior deltoid, supraspinatus, teres major and teres minor. Tenderness of the gluteus rbyce, gluteus medius and piriformis.     Tenderness     Right Hip   Tenderness in the greater trochanter.     Active Range of Motion   Left Shoulder   Flexion: 88 degrees   Abduction: 77 degrees   External rotation 0°: 20 degrees   Internal rotation BTB: sacrum     Right Shoulder   Flexion: 125 degrees   Abduction: 120 degrees   External rotation 0°: 55 degrees   Internal rotation BTB: T9     Passive Range of Motion   Left Shoulder   Flexion: 100 degrees    Abduction: 95 degrees     Strength/Myotome Testing     Left Shoulder     Planes of Motion   Flexion: 4-   Abduction: 4-   External rotation at 0°: 3+   Internal rotation at 0°: 4     Right Shoulder     Planes of Motion   Flexion: 4-   Abduction: 4+   External rotation at 0°: 4   Internal rotation at 0°: 4+     Left Hip   Planes of Motion   Adduction: 4  External rotation: 4    Right Hip   Planes of Motion   Adduction: 4-  External rotation: 4-      Assessment & Plan     Assessment  Impairments: abnormal muscle firing, abnormal or restricted ROM, impaired physical strength, lacks appropriate home exercise program and pain with function  Assessment details: The patient saw a plateau in progress for the shoulder in the last few weeks and did not make any objective improvement of note since his last reassessment. However, he is not having any pain in the shoulder and is pleased with the function that has returned. He has been more concerned with acute onset of lateral R hip pain and this has been the emphasis of treatment for the last 4 weeks. He has responded fairly well to ultrasound, stretching, and light glute strengthening and reported only mild pain this week. He has not tolerated many of the standard exercise positions for eccentric and isometric glute strengthening so modifications have been made as able. He struggled putting a theraband on his knees this week so he was shown an alternative method of holding the band rather than tying it. He is having pain with initial movements after prolonged rest and was advised to stretch prior to getting up. He continues to have TTP in the glutes and greater trochanter along with weakness of the glute med and max when compared bilaterally that I anticipate will improve with ongoing exercise.  Prognosis: good  Functional Limitations: carrying objects, lifting, reaching behind back, reaching overhead and unable to perform repetitive tasks  Goals  Plan Goals: Short Term  Goals (4 weeks):     1. The patient will be independent and compliant with initial HEP. Met    2. The patient will report pain at rest 0/10 or less and worst pain 0/10 or less. Met in shoulder    3. The patient will display decreased TTP in the anterior and superior shoulder and dec mm tension in the surrounding musculature. Partially met    4.  L shoulder AROM will improve to flex 95 deg, abd 90 deg, ER 15 deg, IR L5 deg. Not met    5. The patient will demonstrate inc strength evidenced by MMT as follows: flex 4-/5, abd 4-/5, ER 4-/5, and IR 4/5. Partially met    6. Quick DASH will improve by 11 points or more. Met        Long Term Goals (8 weeks):     1. The patient will be appropriate for independent management and compliant with progressed HEP. Ongoing     2. The patient will report pain at rest 0/10 or less and worst pain 0/10 or less. Met     3. L shoulder AROM will be flex 110, abd 105, ER 20, and IR L3. Ongoing     4. The patient will return to work duties and/or ADLs with no limitations due to shoulder pain or dysfunction. Ongoing     5. The patient will return to recreational and community activities with no limitations due to shoulder pain or dysfunction. Ongoing     6. The pt will report lateral hip pain not to exceed 2/10. New        Plan  Therapy options: will be seen for skilled physical therapy services  Planned modality interventions: cryotherapy, iontophoresis, TENS, electrical stimulation/Russian stimulation and thermotherapy (hydrocollator packs)  Planned therapy interventions: ADL retraining, body mechanics training, flexibility, functional ROM exercises, home exercise program, joint mobilization, manual therapy, neuromuscular re-education, postural training, soft tissue mobilization, strengthening, therapeutic activities and stretching  Frequency: 2x week  Duration in visits: 16  Duration in weeks: 8  Treatment plan discussed with: patient  Plan details: Continue US and progressions of lateral  hip stretching and strengthening.        Visit Diagnoses:  No diagnosis found.    Progress toward previous goals: Partially Met    PT Signature: Jude Bradley PT      Based upon review of the patient's progress and continued therapy plan, it is my medical opinion that Alin Lara should continue physical therapy treatment at [facility]  [address] [place] [address] [phone].    Signature: __________________________________  Tan Miranda MD    Timed:  Manual Therapy:    0     mins  20279;  Therapeutic Exercise:    20     mins  87468;     Neuromuscular Lisbeth:    0    mins  65992;    Therapeutic Activity:     0     mins  54604;     Gait Trainin     mins  02050;     Ultrasound:     10     mins  55770;    Electrical Stimulation:    0     mins  87328 ( );    Untimed:  Electrical Stimulation:    0     mins  70818 ( );  Mechanical Traction:    0     mins  44440;     Timed Treatment:   30   mins   Total Treatment:     30   mins

## 2021-06-14 ENCOUNTER — TREATMENT (OUTPATIENT)
Dept: PHYSICAL THERAPY | Facility: CLINIC | Age: 86
End: 2021-06-14

## 2021-06-14 DIAGNOSIS — M25.512 CHRONIC LEFT SHOULDER PAIN: ICD-10-CM

## 2021-06-14 DIAGNOSIS — R53.1 WEAKNESS: Primary | ICD-10-CM

## 2021-06-14 DIAGNOSIS — G89.29 CHRONIC LEFT SHOULDER PAIN: ICD-10-CM

## 2021-06-14 DIAGNOSIS — M25.60 RANGE OF MOTION DEFICIT: ICD-10-CM

## 2021-06-14 PROCEDURE — 97035 APP MDLTY 1+ULTRASOUND EA 15: CPT | Performed by: PHYSICAL THERAPIST

## 2021-06-14 PROCEDURE — 97140 MANUAL THERAPY 1/> REGIONS: CPT | Performed by: PHYSICAL THERAPIST

## 2021-06-14 NOTE — PROGRESS NOTES
Physical Therapy Daily Treatment Note      Patient: Alin Lara   : 1935  Referring practitioner: Tan Miranda MD  Date of Initial Visit: Type: THERAPY  Noted: 2021  Today's Date: 2021  Patient seen for 17 sessions           Subjective Evaluation    History of Present Illness  Mechanism of injury: The pt stated that his hip pain increased when on his 4 hour flight last week. He tried to stretch and walk around every hour but did not feel it helped and reported his pain level increased to 7/10. It has calmed down since the flight but remains elevated. He has decided to cancel a trip later in the summer due to hip pain and the hassle with traveling. Overall, he feels he has improved and is not concerned with the setback. He feels he is managing well independently between visits.    Pain  Current pain ratin  Location: R hip            Objective   See Exercise, Manual, and Modality Logs for complete treatment.       Assessment & Plan     Assessment  Assessment details: The pt had an increase in pain last week that he attributed to prolonged sitting on an airplane. He was able to manage symptoms during the week with stretching and exercise and is largely independent with his HEP. MT was performed in the clinic today and was not tolerated particularly well, particularly with lateral joint mobs. US was performed to the lateral hip and resulted in reduced pain levels.     Plan  Plan details: Continue stretching, lateral hip exercises, and US. Consider moving to greater level of independence.        Visit Diagnoses:    ICD-10-CM ICD-9-CM   1. Weakness  R53.1 780.79   2. Chronic left shoulder pain  M25.512 719.41    G89.29 338.29   3. Range of motion deficit  M25.60 719.50       Progress per Plan of Care           Timed:  Manual Therapy:    20     mins  97842;  Therapeutic Exercise:    0     mins  86103;     Neuromuscular Lisbeth:    0    mins  48844;    Therapeutic Activity:     0     mins   68478;     Gait Trainin     mins  70689;     Ultrasound:     10     mins  60360;    Electrical Stimulation:    0     mins  14197 ( );    Untimed:  Electrical Stimulation:    0     mins  54587 ( );  Mechanical Traction:    0     mins  70576;     Timed Treatment:   30   mins   Total Treatment:     30   mins  Jude Bradley PT  Physical Therapist

## 2021-06-17 ENCOUNTER — DOCUMENTATION (OUTPATIENT)
Dept: PHYSICAL THERAPY | Facility: CLINIC | Age: 86
End: 2021-06-17

## 2021-06-17 DIAGNOSIS — R53.1 WEAKNESS: Primary | ICD-10-CM

## 2021-06-17 DIAGNOSIS — M25.60 RANGE OF MOTION DEFICIT: ICD-10-CM

## 2021-06-17 DIAGNOSIS — M25.512 CHRONIC LEFT SHOULDER PAIN: ICD-10-CM

## 2021-06-17 DIAGNOSIS — G89.29 CHRONIC LEFT SHOULDER PAIN: ICD-10-CM

## 2021-06-17 NOTE — PROGRESS NOTES
Discharge Summary  Discharge Summary from Physical Therapy Report      Date of initial PT visit: 2/24/21    Number of Visits: 17     Goals: Partially Met    Discharge Plan: Continue with current home exercise program as instructed    Comments: The pt was evaluated and treated S/P L rTSA and was subsequently evaluated and treated for acute R hip pain. He saw fair progress with both conditions and was ultimately pleased with his progress. He felt he was able to manage independently and requested d/c.    Date of Discharge: 6/17/21        Jude Bradley, PT  Physical Therapist

## 2021-07-07 DIAGNOSIS — I70.213 ATHEROSCLEROSIS OF NATIVE ARTERIES OF EXTREMITIES WITH INTERMITTENT CLAUDICATION, BILATERAL LEGS (HCC): ICD-10-CM

## 2021-07-07 DIAGNOSIS — I87.2 VENOUS INSUFFICIENCY OF LEFT LEG: Primary | ICD-10-CM

## 2021-09-30 ENCOUNTER — OFFICE VISIT (OUTPATIENT)
Dept: CARDIAC SURGERY | Facility: CLINIC | Age: 86
End: 2021-09-30

## 2021-09-30 VITALS
TEMPERATURE: 97.8 F | SYSTOLIC BLOOD PRESSURE: 131 MMHG | HEIGHT: 73 IN | DIASTOLIC BLOOD PRESSURE: 70 MMHG | WEIGHT: 266 LBS | BODY MASS INDEX: 35.25 KG/M2 | HEART RATE: 61 BPM | OXYGEN SATURATION: 98 %

## 2021-09-30 DIAGNOSIS — G89.29 CHRONIC PAIN OF LEFT KNEE: Primary | ICD-10-CM

## 2021-09-30 DIAGNOSIS — M25.562 CHRONIC PAIN OF LEFT KNEE: Primary | ICD-10-CM

## 2021-09-30 DIAGNOSIS — I87.2 VENOUS INSUFFICIENCY OF LEFT LEG: ICD-10-CM

## 2021-09-30 PROCEDURE — 99213 OFFICE O/P EST LOW 20 MIN: CPT | Performed by: THORACIC SURGERY (CARDIOTHORACIC VASCULAR SURGERY)

## 2021-09-30 NOTE — PROGRESS NOTES
09/30/2021  Patient Information  Alin GARCIA [facility] [id number]  [facility] [address]   1935  'PCP/Referring Physician'  Ravi Neville MD  No ref. provider found  Chief Complaint   Patient presents with   • Follow-up     6 mo f/u with with venous duplex        CC: My left leg is swollen    History of Present Illness: 85-year-old  male being seen in follow-up for chronic venous insufficiency of the left lower extremity.  This gentleman had DVT and pulmonary embolism several years ago.  Since that time he has persistent swelling in his left lower extremity.  He states that for the most part he does not have significant pain or discomfort.  He was fitted for gradated pressure stockings.  He feels like the stockings might work but because of his age and arthritis he has difficulty getting the stockings on.  For the most part he is not using them.  His leg pain is persistent and typically is less so in the morning and progresses as the day advances.  He does not have calf tenderness.  He has never had ulcerations of the left lower extremity.  He does have 1 circular lesion along the medial aspect about 10 cm above the medial malleolus.  This area is red and blanches but has never been ulcerated and is not tender.      Patient Active Problem List   Diagnosis   • Paroxysmal atrial fibrillation (CMS/HCC)   • Basal cell carcinoma of skin   • Impotence of organic origin   • Gastroesophageal reflux disease   • Hyperlipidemia   • Hypertension   • Persistent insomnia   • Knee pain   • Osteoarthritis of lumbar spine   • Adiposity   • Overactive bladder   • Arthralgia of hip   • Impaired glucose tolerance   • Prostatism   • History of pulmonary embolism (CMS/HCC)   • Inflammation of sacroiliac joint (CMS/HCC)   • Generalized osteoarthritis   • Preventative health care   • Vertigo   • TIA (transient ischemic attack)   •  Lymphocytic colitis   • Esophageal stricture   • Left shoulder pain   • Primary localized osteoarthrosis of left shoulder region   • Contracture of joint of left shoulder region   • Biceps tendinitis of left upper extremity   • Status post reverse total shoulder replacement, left   • Leukocytosis, likely reactive   • Acute postoperative pain     Past Medical History:   Diagnosis Date   • Basal cell carcinoma    • Blood loss     post op   • ED (erectile dysfunction)     Responsive to Viagra   • Failed total knee, right (CMS/HCC)    • Fasting hypoglycemia 2016    Hemoglobin A1c normal   • Generalized osteoarthritis    • GERD (gastroesophageal reflux disease)     History esophageal stricture   • History of blood clots    • HNP (herniated nucleus pulposus), lumbar 1988, 2014    L5 L6   • Hyperglycemia     not diabetic, pt states hes never has high blood sugar per pt    • Hyperlipidemia    • Hypertension    • Microscopic colitis 2017    Positive biopsy - colonoscopy   • OAB (overactive bladder) 2000    Persistent urgency   • Obesity Adulthood    2012 body weight 244 BMI 32   • Painful total knee replacement (CMS/[facility])    • Paroxysmal atrial fibrillation (CMS/[facility]) 1991    one episode - cardioverted   • Prostatism 2014   • Pulmonary embolism (CMS/[facility]) 1991    after injury and protracted immobilization   • Right knee DJD 2015    Partial knee arthroplasty   • Shoulder dislocation     Repeated episodes   • Sleep disturbances 1990    Frequently requires medication   • Transient cerebral ischemia 2010, 2016    Negative medical workup on both occasions   • Venous stasis    • Vertigo    • Wears glasses      Past Surgical History:   Procedure Laterality Date   • CARDIOVERSION  1991    Atrial fibrillation   • COLONOSCOPY      2018   • KNEE ARTHROPLASTY Right 2015    Partial    • KNEE ARTHROPLASTY Left    • KNEE ARTHROSCOPY Right 2015    Failed procedure   • LUMBAR DISC SURGERY  2014    Surgery ×2 Ruptured disc   • LUMBAR DISCECTOMY   1988    L5 L6    • SHOULDER SURGERY     • TOTAL SHOULDER ARTHROPLASTY W/ DISTAL CLAVICLE EXCISION Left 10/19/2020    Procedure: TOTAL SHOULDER REVERSE ARTHROPLASTY LEFT;  Surgeon: Tan Miranda MD;  Location: [facility];  Service: Orthopedics;  Laterality: Left;       Current Outpatient Medications:   •  ascorbic acid (VITAMIN C) 1000 MG tablet, Take 1 capsule by mouth Daily., Disp: , Rfl:   •  atenolol (TENORMIN) 50 MG tablet, TAKE 1 TABLET BY MOUTH DAILY., Disp: 90 tablet, Rfl: 1  •  Cholecalciferol (VITAMIN D-3) 25 MCG (1000 UT) capsule, Take  by mouth., Disp: , Rfl:   •  Coenzyme Q10 200 MG capsule, Take 200 mg by mouth Daily., Disp: 90 capsule, Rfl: 1  •  docusate sodium (Colace) 100 MG capsule, Take 1 capsule by mouth 2 (Two) Times a Day., Disp: 60 capsule, Rfl: 0  •  Multiple Vitamins-Minerals (CENTRUM ADULTS) tablet, Take 1-2 tablets by mouth daily., Disp: , Rfl:   •  rivaroxaban (XARELTO) 20 MG tablet, Take 1 tablet by mouth Daily. Resume 10/22/2020, Disp: 30 tablet, Rfl:   •  Ropivacine HCl-NaCl (NAROPIN), 12 mg/hr by Peripheral Nerve route Continuous., Disp:  , Rfl:   •  sildenafil (VIAGRA) 100 MG tablet, Take 0.5-1 tablets by mouth as needed., Disp: , Rfl:   •  tamsulosin (FLOMAX) 0.4 MG capsule 24 hr capsule, TAKE 1 CAPSULE EVERY 12 HOURS (Patient taking differently: 2 capsules every night at bedtime.), Disp: 180 capsule, Rfl: 1  •  Zinc 50 MG capsule, Take  by mouth., Disp: , Rfl:   •  CALCIUM-MAGNESIUM-ZINC PO, Take 1 tablet by mouth Daily., Disp: , Rfl:   •  oxybutynin XL (DITROPAN-XL) 5 MG 24 hr tablet, Take 1 tablet by mouth Every 12 (Twelve) Hours. (Patient taking differently: Take 5 mg by mouth every night at bedtime.), Disp: 180 tablet, Rfl: 1  Allergies   Allergen Reactions   • Pravastatin Other (See Comments)     Nocturnal leg cramps   • Atorvastatin      Fatigue   • Qsymia [Phentermine-Topiramate] Mental Status Change   • Topiramate Myalgia     Social History     Socioeconomic History    • Marital status:      Spouse name: Not on file   • Number of children: 2   • Years of education: Not on file   • Highest education level: Not on file   Tobacco Use   • Smoking status: Never Smoker   • Smokeless tobacco: Former User     Types: Chew   Substance and Sexual Activity   • Alcohol use: Yes     Alcohol/week: 1.0 standard drinks     Types: 1 Cans of beer per week     Comment: Occasionally   • Drug use: No   • Sexual activity: Yes     Partners: Female     Comment: Monogamous     Family History   Problem Relation Age of Onset   • Dementia Mother          age 94   • Other Father          age 86 of unknown cause   • Rheum arthritis Father    • Coronary artery disease Brother    • No Known Problems Brother    • Sick sinus syndrome Other         Has pacemaker   • Diabetes Paternal Grandmother        ROS, past medical history, surgical history, family history, social history and vitals  reviewed by me and corrected as needed.        Review of Systems   Constitutional: Negative for chills, fever, malaise/fatigue, night sweats and weight loss.   HENT: Negative for hearing loss, odynophagia and sore throat.    Cardiovascular: Positive for leg swelling ( LT leg swelling - has to due to knee operation ). Negative for chest pain, dyspnea on exertion, orthopnea and palpitations.   Respiratory: Negative for cough and shortness of breath.    Hematologic/Lymphatic: Does not bruise/bleed easily.   Skin: Negative for itching and rash.   Musculoskeletal: Negative for arthritis, joint pain, joint swelling and myalgias.   Gastrointestinal: Negative for abdominal pain, constipation, diarrhea, hematemesis, hematochezia, nausea and vomiting.   Genitourinary: Negative for dysuria, frequency and hematuria.   Neurological: Negative for focal weakness, headaches, numbness and seizures.   Psychiatric/Behavioral: Negative for suicidal ideas.       Vitals:    21 0848   BP: 131/70   BP Location: Right arm   Pulse: 61  "  Temp: 97.8 °F (36.6 °C)   SpO2: 98%   Weight: 121 kg (266 lb)   Height: 185.4 cm (73\")        Physical Exam  Vitals and nursing note reviewed.   Constitutional:       General: He is not in acute distress.     Appearance: Normal appearance. He is normal weight.   HENT:      Head: Normocephalic and atraumatic.      Right Ear: External ear normal.      Left Ear: External ear normal.      Nose: Nose normal.      Mouth/Throat:      Mouth: Mucous membranes are moist.   Eyes:      Extraocular Movements: Extraocular movements intact.      Pupils: Pupils are equal, round, and reactive to light.   Neck:      Vascular: No carotid bruit.   Cardiovascular:      Rate and Rhythm: Normal rate. Rhythm irregular.      Pulses: Normal pulses.      Heart sounds: Normal heart sounds. No murmur heard.     Pulmonary:      Effort: Pulmonary effort is normal. No respiratory distress.      Breath sounds: No wheezing, rhonchi or rales.   Abdominal:      General: Abdomen is flat. Bowel sounds are normal.      Palpations: Abdomen is soft. There is no mass.      Tenderness: There is no abdominal tenderness. There is no guarding.   Musculoskeletal:         General: No swelling or tenderness.      Cervical back: Normal range of motion. No rigidity. No muscular tenderness.      Right lower leg: No edema.      Left lower leg: No edema.      Comments: Left leg is swollen from the knee distally is compared to the right leg.  There is essentially 1+ pretibial edema.  No ulcerations are noted.  Pedal pulses are palpable bilaterally.  Skin and nails of the feet are normal.  Motor and sensory function of the toes is normal.   Lymphadenopathy:      Cervical: No cervical adenopathy.   Skin:     General: Skin is warm and dry.      Capillary Refill: Capillary refill takes less than 2 seconds.      Coloration: Skin is not jaundiced.      Findings: No erythema.   Neurological:      General: No focal deficit present.      Mental Status: He is alert and oriented " to person, place, and time. Mental status is at baseline.   Psychiatric:         Mood and Affect: Mood normal.         Behavior: Behavior normal.         Thought Content: Thought content normal.         Judgment: Judgment normal.         Labs: None    Imaging: Venous duplex done on the lower extremities in July reviewed.  There is no evidence of acute DVT in either lower extremity.  The right lower extremity is normal the left lower extremity shows changes consistent with chronic venous insufficiency secondary to previous DVT.    Assessment: Chronic venous insufficiency left lower extremity    Plan: Continue current treatment program.  Attempt to get low graded pressure stockings that the patient can use.    Electronically signed by Baljinder Zamora MD, 09/30/21, 9:48 AM EDT.

## 2021-10-19 ENCOUNTER — OFFICE VISIT (OUTPATIENT)
Dept: ORTHOPEDIC SURGERY | Facility: CLINIC | Age: 86
End: 2021-10-19

## 2021-10-19 VITALS
HEIGHT: 73 IN | HEART RATE: 51 BPM | SYSTOLIC BLOOD PRESSURE: 146 MMHG | BODY MASS INDEX: 35.35 KG/M2 | DIASTOLIC BLOOD PRESSURE: 74 MMHG | WEIGHT: 266.76 LBS

## 2021-10-19 DIAGNOSIS — Z96.612 STATUS POST REVERSE TOTAL SHOULDER REPLACEMENT, LEFT: Primary | ICD-10-CM

## 2021-10-19 DIAGNOSIS — M19.012 PRIMARY LOCALIZED OSTEOARTHROSIS OF LEFT SHOULDER REGION: ICD-10-CM

## 2021-10-19 PROCEDURE — 99213 OFFICE O/P EST LOW 20 MIN: CPT | Performed by: ORTHOPAEDIC SURGERY

## 2021-10-19 NOTE — PROGRESS NOTES
[facility] [place]       Office Follow Up Visit       Patient Name: Alin Lara    Chief Complaint:   Chief Complaint   Patient presents with   • Follow-up     5 MONTH f/u--1 year post Total Shoulder Reverse Arthroplasty Left 10/19/20       Referring Physician: No ref. provider found    History of Present Illness:   It has been 5  month(s) since Alin Lara's last visit. Alin Lara returns to clinic today for F/U: follow-up of leftBody Part: shoulderReason: arthroplasty. The issue has been ongoing for 1 year(s). Alin Lara rates HIS/HER: hispain at 4/10 on the pain scale. Previous/current treatments: NSAIDS and physical therapy. Current symptoms:Symptoms: pain. The pain is worse with sleeping and working; resting improves the pain. Overall, he/she: heis doing worse.  I have reviewed the patient's history of present illness as noted/entered above.    I have reviewed the patient's past medical history, surgical history, social history, family history, medications, and allergies as noted in the electronic medical record and as noted/entered.  I have reviewed the patient's review of systems as noted/enter and updated as noted in the patient's HPI.    10/19/2021:  Left shoulder has plateaued he feels somewhat worse she has been working on physical therapy with his lower extremity issues.   PT and notes and note with Dr. Bermudez reviewed.  He had his recent 2-year follow-up following his left total knee and he said lymphadenopathy and swelling of that lower extremity since the surgery on the knee.    Counseled on the left shoulder his radiographs appear stable compared to prior he is currently doing physical therapy for his lower extremity but not his shoulder.    Some shoulder soreness about 6 weeks ago.  Nowhere near the pain it was preop    Cardioversion after last visit, discussed with his heart team at that  visit  Bout of elevated BP, ICU x 3 days -- no issues since      Left shoulder -- deltoid insertional pain    Latoya Chemo - knee PT    5/18/2021:  Improvements noted     Left shoulder 7 months status post reverse shoulder arthroplasty for severe B3 glenoid     Prior history:  Date of surgery 10/19/2020 left complex reverse shoulder replacement with augmented glenoid component for severe B3 glenoid     Home health physical therapy  4 months postop     Saw Dr. Zamora for left venous insufficiency     Enjoyed HH PT with Adrienne and did very well, his is interested in Outpatient PT at [facility]      Subjective   Subjective      Review of Systems   Constitutional: Negative.  Negative for chills, fatigue and fever.   HENT: Negative.  Negative for congestion and dental problem.    Eyes: Negative.  Negative for blurred vision.   Respiratory: Negative.  Negative for shortness of breath.    Cardiovascular: Negative.  Negative for leg swelling.   Gastrointestinal: Negative.  Negative for abdominal pain.   Endocrine: Negative.  Negative for polyuria.   Genitourinary: Negative.  Negative for difficulty urinating.   Musculoskeletal: Positive for arthralgias.   Skin: Negative.    Allergic/Immunologic: Negative.    Neurological: Negative.    Hematological: Negative.  Negative for adenopathy.   Psychiatric/Behavioral: Negative.  Negative for behavioral problems.        Past Medical History:   Past Medical History:   Diagnosis Date   • Basal cell carcinoma    • Blood loss     post op   • ED (erectile dysfunction)     Responsive to Viagra   • Failed total knee, right (HCC)    • Fasting hypoglycemia 2016    Hemoglobin A1c normal   • Generalized osteoarthritis    • GERD (gastroesophageal reflux disease)     History esophageal stricture   • History of blood clots    • HNP (herniated nucleus pulposus), lumbar 1988, 2014    L5 L6   • Hyperglycemia     not diabetic, pt states hes never has high blood sugar per pt    •  Hyperlipidemia    • Hypertension    • Microscopic colitis     Positive biopsy - colonoscopy   • OAB (overactive bladder)     Persistent urgency   • Obesity Adulthood    2012 body weight 244 BMI 32   • Painful total knee replacement (HCC)    • Paroxysmal atrial fibrillation (HCC)     one episode - cardioverted   • Prostatism    • Pulmonary embolism (HCC)     after injury and protracted immobilization   • Right knee DJD     Partial knee arthroplasty   • Shoulder dislocation     Repeated episodes   • Sleep disturbances     Frequently requires medication   • Transient cerebral ischemia ,     Negative medical workup on both occasions   • Venous stasis    • Vertigo    • Wears glasses        Past Surgical History:   Past Surgical History:   Procedure Laterality Date   • CARDIOVERSION      Atrial fibrillation   • COLONOSCOPY         • KNEE ARTHROPLASTY Right 2015    Partial    • KNEE ARTHROPLASTY Left    • KNEE ARTHROSCOPY Right 2015    Failed procedure   • LUMBAR DISC SURGERY  2014    Surgery ×2 Ruptured disc   • LUMBAR DISCECTOMY      L5 L6    • SHOULDER SURGERY     • TOTAL SHOULDER ARTHROPLASTY W/ DISTAL CLAVICLE EXCISION Left 10/19/2020    Procedure: TOTAL SHOULDER REVERSE ARTHROPLASTY LEFT;  Surgeon: Tan Miranda MD;  Location: [facility];  Service: Orthopedics;  Laterality: Left;       Family History:   Family History   Problem Relation Age of Onset   • Dementia Mother          age 94   • Other Father          age 86 of unknown cause   • Rheum arthritis Father    • Coronary artery disease Brother    • No Known Problems Brother    • Sick sinus syndrome Other         Has pacemaker   • Diabetes Paternal Grandmother        Social History:   Social History     Socioeconomic History   • Marital status:    • Number of children: 2   Tobacco Use   • Smoking status: Never Smoker   • Smokeless tobacco: Former User     Types: Chew   Substance and Sexual  Activity   • Alcohol use: Yes     Alcohol/week: 1.0 standard drink     Types: 1 Cans of beer per week     Comment: Occasionally   • Drug use: No   • Sexual activity: Yes     Partners: Female     Comment: Monogamous       Medications:   Current Outpatient Medications:   •  ascorbic acid (VITAMIN C) 1000 MG tablet, Take 1 capsule by mouth Daily., Disp: , Rfl:   •  atenolol (TENORMIN) 50 MG tablet, TAKE 1 TABLET BY MOUTH DAILY., Disp: 90 tablet, Rfl: 1  •  CALCIUM-MAGNESIUM-ZINC PO, Take 1 tablet by mouth Daily., Disp: , Rfl:   •  Cholecalciferol (VITAMIN D-3) 25 MCG (1000 UT) capsule, Take  by mouth., Disp: , Rfl:   •  Coenzyme Q10 200 MG capsule, Take 200 mg by mouth Daily., Disp: 90 capsule, Rfl: 1  •  docusate sodium (Colace) 100 MG capsule, Take 1 capsule by mouth 2 (Two) Times a Day., Disp: 60 capsule, Rfl: 0  •  Multiple Vitamins-Minerals (CENTRUM ADULTS) tablet, Take 1-2 tablets by mouth daily., Disp: , Rfl:   •  oxybutynin XL (DITROPAN-XL) 5 MG 24 hr tablet, Take 1 tablet by mouth Every 12 (Twelve) Hours. (Patient taking differently: Take 5 mg by mouth every night at bedtime.), Disp: 180 tablet, Rfl: 1  •  rivaroxaban (XARELTO) 20 MG tablet, Take 1 tablet by mouth Daily. Resume 10/22/2020, Disp: 30 tablet, Rfl:   •  Ropivacine HCl-NaCl (NAROPIN), 12 mg/hr by Peripheral Nerve route Continuous., Disp:  , Rfl:   •  sildenafil (VIAGRA) 100 MG tablet, Take 0.5-1 tablets by mouth as needed., Disp: , Rfl:   •  tamsulosin (FLOMAX) 0.4 MG capsule 24 hr capsule, TAKE 1 CAPSULE EVERY 12 HOURS (Patient taking differently: 2 capsules every night at bedtime.), Disp: 180 capsule, Rfl: 1  •  Zinc 50 MG capsule, Take  by mouth., Disp: , Rfl:     Allergies:   Allergies   Allergen Reactions   • Pravastatin Other (See Comments)     Nocturnal leg cramps   • Atorvastatin      Fatigue   • Qsymia [Phentermine-Topiramate] Mental Status Change   • Topiramate Myalgia       The following portions of the patient's history were reviewed  "and updated as appropriate: allergies, current medications, past family history, past medical history, past social history, past surgical history and problem list.        Objective    Objective      Vital Signs:   Vitals:    10/19/21 0759   BP: 146/74   Pulse: 51   Weight: 121 kg (266 lb 12.1 oz)   Height: 185.4 cm (72.99\")       Ortho Exam:  LEFT SHOULDER  Some deltoid insertional pain  No evidence of infection  Well perfused  Incision healed  Still has good PROM  No joint pain  AROM some deltoid insertional pain    Results Review:  Imaging Results (Last 24 Hours)     Procedure Component Value Units Date/Time    XR Shoulder 2+ View Left [[id number]] Resulted: 10/19/21 0817     Updated: 10/19/21 0818    Narrative:      Imaging: shoulder x-rays 3 views - AP, axillary, and scapular-Y x-ray   views    Side: LEFT    Indication for shoulder x-ray 3 views: shoulder pain    Comparison: Postoperative imaging    Findings: No acute bony pathology. Implants well appearing and well   positioned after shoulder replacement.  Located and no fracture noted.    Left shoulder 3 views are closely compared to his prior images   approximately 5 months ago.  No changes are noted the implants appear to   be stable and located in comparison to prior images.    I personally reviewed the above x-rays and discussed with the patient.            Procedures          Assessment / Plan      Assessment/Plan:   Problem List Items Addressed This Visit        Musculoskeletal and Injuries    Primary localized osteoarthrosis of left shoulder region    Relevant Orders    Ambulatory Referral to Physical Therapy Evaluate and treat, Ortho (Completed)    Status post reverse total shoulder replacement, left - Primary    Relevant Orders    XR Shoulder 2+ View Left (Completed)    Ambulatory Referral to Physical Therapy Evaluate and treat, Ortho (Completed)        LEFT SHOULDER  Deltoid insertional strain -- I think additional PT will be beneficial  Implants well " appearing  PT for deltoid strain    Follow Up: 3 months to reassess      Tan Miranda MD, FAAOS  Orthopedic Surgeon  Fellowship Trained Shoulder and Elbow Surgeon  [facility]  Orthopedics and Sports Medicine  [address] [place] [address], [address]  [place] [address]    10/19/21  08:27 EDT    Please note that portions of this note may have been completed with a voice recognition program. Efforts were made to edit the dictations, but occasionally words are mistranscribed.

## 2022-03-31 ENCOUNTER — OFFICE VISIT (OUTPATIENT)
Dept: CARDIAC SURGERY | Facility: CLINIC | Age: 87
End: 2022-03-31

## 2022-03-31 VITALS
TEMPERATURE: 98.7 F | BODY MASS INDEX: 36.18 KG/M2 | WEIGHT: 273 LBS | DIASTOLIC BLOOD PRESSURE: 85 MMHG | HEIGHT: 73 IN | OXYGEN SATURATION: 97 % | HEART RATE: 56 BPM | SYSTOLIC BLOOD PRESSURE: 156 MMHG

## 2022-03-31 DIAGNOSIS — I48.0 PAROXYSMAL ATRIAL FIBRILLATION: ICD-10-CM

## 2022-03-31 DIAGNOSIS — G89.29 CHRONIC PAIN OF LEFT KNEE: Primary | ICD-10-CM

## 2022-03-31 DIAGNOSIS — I87.2 VENOUS INSUFFICIENCY OF LEFT LEG: ICD-10-CM

## 2022-03-31 DIAGNOSIS — M25.562 CHRONIC PAIN OF LEFT KNEE: Primary | ICD-10-CM

## 2022-03-31 PROCEDURE — 99213 OFFICE O/P EST LOW 20 MIN: CPT | Performed by: THORACIC SURGERY (CARDIOTHORACIC VASCULAR SURGERY)

## 2022-05-16 ENCOUNTER — OFFICE VISIT (OUTPATIENT)
Dept: ORTHOPEDIC SURGERY | Facility: CLINIC | Age: 87
End: 2022-05-16

## 2022-05-16 VITALS
WEIGHT: 274 LBS | SYSTOLIC BLOOD PRESSURE: 140 MMHG | HEIGHT: 73 IN | DIASTOLIC BLOOD PRESSURE: 80 MMHG | BODY MASS INDEX: 36.31 KG/M2

## 2022-05-16 DIAGNOSIS — Z96.612 HISTORY OF TOTAL REPLACEMENT OF LEFT SHOULDER JOINT: ICD-10-CM

## 2022-05-16 DIAGNOSIS — M25.512 LEFT SHOULDER PAIN, UNSPECIFIED CHRONICITY: Primary | ICD-10-CM

## 2022-05-16 DIAGNOSIS — M79.642 LEFT HAND PAIN: ICD-10-CM

## 2022-05-16 DIAGNOSIS — M25.562 LEFT KNEE PAIN, UNSPECIFIED CHRONICITY: ICD-10-CM

## 2022-05-16 DIAGNOSIS — I89.0 LYMPHEDEMA OF LEFT LOWER EXTREMITY: ICD-10-CM

## 2022-05-16 DIAGNOSIS — S60.229A CONTUSION OF DORSUM OF HAND: ICD-10-CM

## 2022-05-16 DIAGNOSIS — Z96.652 HISTORY OF TOTAL LEFT KNEE REPLACEMENT: ICD-10-CM

## 2022-05-16 DIAGNOSIS — S42.202A CLOSED FRACTURE OF PROXIMAL END OF LEFT HUMERUS, UNSPECIFIED FRACTURE MORPHOLOGY, INITIAL ENCOUNTER: ICD-10-CM

## 2022-05-16 DIAGNOSIS — S80.02XA CONTUSION OF LEFT KNEE, INITIAL ENCOUNTER: ICD-10-CM

## 2022-05-16 DIAGNOSIS — S80.212A ABRASION, KNEE, LEFT, INITIAL ENCOUNTER: ICD-10-CM
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
## 2022-05-16 PROCEDURE — 99214 OFFICE O/P EST MOD 30 MIN: CPT | Performed by: PHYSICIAN ASSISTANT

## 2022-05-16 NOTE — PROGRESS NOTES
[facility] Orthopaedic Surgery Clinic Note    Subjective     Chief Complaint   Patient presents with   • Left Shoulder - Pain   DOI: 5/6/2022    LAINEY Lara is a 86 y.o. male.  Established patient (new to me) presents for evaluation of left shoulder, left knee.  KERRI: Patient with a history of a fall 5/6/2022.  States he contacted Dr. Miranda who advised him to come into the clinic today for evaluation of his left shoulder--he knew there was something wrong with that after the fall due to pain and inability to move the shoulder.  Additionally he is also complaining of pain to the left knee, history of left knee TKA 2 to 3 years ago by Dr. Bermudez.    History of reverse TSA 10/19/2020 by Dr. Miranda.    Pain scale: 7/10.  Severity of the pain moderate to severe.  Quality of the pain throbbing, shooting.  Associated symptoms pain.  Activity related to pain walking, standing, sitting, stair climbing, rising from a seated position, movement of joint.  Pain eased by resting.  No reported numbness or tingling.      Denies fever, chills, night sweats or other constitutional symptoms.      Past Medical History:   Diagnosis Date   • Basal cell carcinoma    • Blood loss     post op   • ED (erectile dysfunction)     Responsive to Viagra   • Failed total knee, right (HCC)    • Fasting hypoglycemia 2016    Hemoglobin A1c normal   • Generalized osteoarthritis    • GERD (gastroesophageal reflux disease)     History esophageal stricture   • History of blood clots    • HNP (herniated nucleus pulposus), lumbar 1988, 2014    L5 L6   • Hyperglycemia     not diabetic, pt states hes never has high blood sugar per pt    • Hyperlipidemia    • Hypertension    • Microscopic colitis 2017    Positive biopsy - colonoscopy   • OAB (overactive bladder) 2000    Persistent urgency   • Obesity Adulthood    2012 body weight 244 BMI 32   • Painful total knee replacement (HCC)    • Paroxysmal atrial fibrillation (HCC) 1991    one episode -  cardioverted   • Prostatism    • Pulmonary embolism (HCC)     after injury and protracted immobilization   • Right knee DJD     Partial knee arthroplasty   • Shoulder dislocation     Repeated episodes   • Sleep disturbances     Frequently requires medication   • Transient cerebral ischemia ,     Negative medical workup on both occasions   • Venous stasis    • Vertigo    • Wears glasses       Past Surgical History:   Procedure Laterality Date   • CARDIOVERSION      Atrial fibrillation   • COLONOSCOPY      2018   • KNEE ARTHROPLASTY Right 2015    Partial    • KNEE ARTHROPLASTY Left    • KNEE ARTHROSCOPY Right 2015    Failed procedure   • LUMBAR DISC SURGERY  2014    Surgery ×2 Ruptured disc   • LUMBAR DISCECTOMY      L5 L6    • SHOULDER SURGERY     • TOTAL SHOULDER ARTHROPLASTY W/ DISTAL CLAVICLE EXCISION Left 10/19/2020    Procedure: TOTAL SHOULDER REVERSE ARTHROPLASTY LEFT;  Surgeon: Tan Miranda MD;  Location: [facility];  Service: Orthopedics;  Laterality: Left;      Family History   Problem Relation Age of Onset   • Dementia Mother          age 94   • Other Father          age 86 of unknown cause   • Rheum arthritis Father    • Coronary artery disease Brother    • No Known Problems Brother    • Sick sinus syndrome Other         Has pacemaker   • Diabetes Paternal Grandmother      Social History     Socioeconomic History   • Marital status:    • Number of children: 2   Tobacco Use   • Smoking status: Never Smoker   • Smokeless tobacco: Former User     Types: Chew     Quit date:    Vaping Use   • Vaping Use: Never used   Substance and Sexual Activity   • Alcohol use: Yes     Alcohol/week: 1.0 standard drink     Types: 1 Cans of beer per week     Comment: Occasionally   • Drug use: No   • Sexual activity: Yes     Partners: Female     Comment: Monogamous      Current Outpatient Medications on File Prior to Visit   Medication Sig Dispense Refill   • apixaban  (ELIQUIS) 5 MG tablet tablet Take 5 mg by mouth.     • ascorbic acid (VITAMIN C) 1000 MG tablet Take 1 capsule by mouth Daily.     • atenolol (TENORMIN) 50 MG tablet TAKE 1 TABLET BY MOUTH DAILY. 90 tablet 1   • CALCIUM-MAGNESIUM-ZINC PO Take 1 tablet by mouth Daily.     • Cholecalciferol (VITAMIN D-3) 25 MCG (1000 UT) capsule Take  by mouth.     • Coenzyme Q10 200 MG capsule Take 200 mg by mouth Daily. 90 capsule 1   • docusate sodium (Colace) 100 MG capsule Take 1 capsule by mouth 2 (Two) Times a Day. 60 capsule 0   • Multiple Vitamins-Minerals (CENTRUM ADULTS) tablet Take 1-2 tablets by mouth daily.     • oxybutynin XL (DITROPAN-XL) 5 MG 24 hr tablet Take 1 tablet by mouth Every 12 (Twelve) Hours. 180 tablet 1   • rivaroxaban (XARELTO) 20 MG tablet Take 1 tablet by mouth Daily. Resume 10/22/2020 30 tablet    • Ropivacine HCl-NaCl (NAROPIN) 12 mg/hr by Peripheral Nerve route Continuous.     • sildenafil (VIAGRA) 100 MG tablet Take 0.5-1 tablets by mouth as needed.     • tamsulosin (FLOMAX) 0.4 MG capsule 24 hr capsule TAKE 1 CAPSULE EVERY 12 HOURS (Patient taking differently: 2 capsules every night at bedtime.) 180 capsule 1   • Zinc 50 MG capsule Take  by mouth.       No current facility-administered medications on file prior to visit.      Allergies   Allergen Reactions   • Pravastatin Other (See Comments)     Nocturnal leg cramps   • Atorvastatin      Fatigue   • Qsymia [Phentermine-Topiramate] Mental Status Change   • Topiramate Myalgia        The following portions of the patient's history were reviewed and updated as appropriate: allergies, current medications, past family history, past medical history, past social history, past surgical history and problem list.    Review of Systems   Constitutional: Negative.    HENT: Negative.    Eyes: Negative.    Respiratory: Negative.    Cardiovascular: Negative.    Gastrointestinal: Negative.    Endocrine: Negative.    Genitourinary: Negative.    Musculoskeletal:  "Positive for arthralgias.   Skin: Negative.    Allergic/Immunologic: Negative.    Neurological: Negative.    Hematological: Negative.    Psychiatric/Behavioral: Negative.         Objective      Physical Exam  /80   Ht 185.4 cm (72.99\")   Wt 124 kg (274 lb)   BMI 36.16 kg/m²     Body mass index is 36.16 kg/m².    GENERAL APPEARANCE: awake, alert & oriented x 3, in no acute distress and well developed, well nourished  PSYCH: normal mood and affect  LUNGS:  breathing nonlabored, no wheezing  EYES: sclera anicteric, pupils equal  CARDIOVASCULAR: palpable pulses. Capillary refill less than 2 seconds  INTEGUMENTARY: skin intact, no clubbing, cyanosis  NEUROLOGIC:  Normal Sensation         Ortho Exam  Left shoulder  Skin: Grossly intact without any redness, warmth.  Mild swelling noted to the upper arm.  All compartments are soft and compressible.  Reverse TSA surgical incision site well-healed without redness warmth drainage or evidence of infection.  Tenderness: Positive tenderness noted throughout the shoulder and upper arm.  Motion: Range of motion of the shoulder not assessed.  Patient does have full range of motion of the elbow.    Left hand  Skin: Grossly intact without any redness or warmth.  Soft tissue swelling and bruising noted to the hand.  Tenderness: Positive throughout the hand along with bruising and swelling.    Motion: Secondary to the swelling only able to make very loose composite fist but notes tightness when doing so.  Motor/sensory: Grossly intact C5-T1.    Left knee  Skin: Positive abrasion noted anterior aspect of the knee.  Resolving ecchymosis anterior knee.  Lymphedema/soft tissue swelling noted to knee and lower leg.  Tenderness: Positive throughout knee and lower leg.  Motion: 5/10-90 degrees  Quadricep mechanism intact.  Motor/sensory: Grossly intact L2-S1      Imaging/Studies  Ordered left shoulder plain films.  Imaging read/interpreted by Dr. Peña.    Imaging Results (Last 7 Days) "     Procedure Component Value Units Date/Time    XR Shoulder 2+ View Left [[id number]] Resulted: 05/16/22 0923     Updated: 05/16/22 0924    Narrative:      Left Shoulder Radiographs  Indication: left shoulder pain  Views: AP, outlet and axillary views of the left shoulder    Comparison: 10/19/2021    Findings:  Nondisplaced proximal humerus fracture, involving the lateral metaphyseal   region/greater tuberosity, with no signs of loosening of the underlying   reverse total shoulder arthroplasty.  No other unusual bony features.        Ordered left knee plain films.  Imaging read/interpreted by Dr. Macedo.    Indication: Left knee pain today status post total knee arthroplasty     Comparison: Todays xrays were compared to previous xrays from 1/12/2021     IMPRESSION:      Left Knee: Demonstrate well positioned knee arthroplasty components in satisfactory alignment without evidence of wear, loosening, subsidence, fracture, or osteolysis and No significant changes compared to prior radiographs.  Assessment/Plan        ICD-10-CM ICD-9-CM   1. Left shoulder pain, unspecified chronicity  M25.512 719.41   2. Closed fracture of proximal end of left humerus, unspecified fracture morphology, initial encounter  S42.202A 812.00   3. History of total replacement of left shoulder joint  Z96.612 V43.61   4. Left knee pain, unspecified chronicity  M25.562 719.46   5. Abrasion, knee, left, initial encounter  S80.212A 916.0   6. Contusion of left knee, initial encounter  S80.02XA 924.11   7. Lymphedema of left lower extremity  I89.0 457.1   8. History of total left knee replacement  Z96.652 V43.65   9. Left hand pain  M79.642 729.5   10. Contusion of dorsum of hand  S60.229A 923.20   11. Fall from slip, trip, or stumble, initial encounter  W01.0XXA E885.9       Orders Placed This Encounter   Procedures   • XR Shoulder 2+ View Left        Left shoulder pain due to a proximal humerus periprosthetic fracture in setting of reverse TSA  (October 2020) secondary to fall--placed into sling, nonweightbearing left upper extremity.  Okay to perform gentle range of motion elbow, wrist and digits.    Left knee pain due to contusion and abrasion in setting of prior TKA--components stable.  Ice, elevation and gentle range of motion.  Weightbearing as tolerated.    Left lower leg lymphedema--Per patient increased since fall.  Patient does not have compressions socks at this time.  Patient needs to wear compression socks as well as elevation to help with the lymphedema.    Left hand pain due to contusion--offered to get imaging of the hand but patient politely declined stating that symptoms have slowly been improving since fall, if he still symptomatic to this area next week then will consider     Follow up next week 5/24/2022--needs repeat imaging AP and lateral of shoulder and if hand remains swollen and painful imaging of the left hand.  This needs to be on Tuesday Dr. Miranda is also in clinic.  Recommend over-the-counter pain medication as needed.      Questions and concerns answered.      Medical Decision Making  Management Options : over-the-counter medicine  Data/Risk: radiology tests       Kisha Morin PA-C  05/17/22  10:15 EDT               EMR Dragon/Transcription disclaimer:  Much of this encounter note is an electronic transcription of spoken language to printed text. Electronic transcription of spoken language may permit erroneous, or at times, nonsensical words or phrases to be inadvertently transcribed. Although I have reviewed the note for such errors, some may still exist.

## 2022-05-24 ENCOUNTER — HOSPITAL ENCOUNTER (OUTPATIENT)
Dept: CARDIOLOGY | Facility: HOSPITAL | Age: 87
Discharge: HOME | End: 2022-05-24
Admitting: PHYSICIAN ASSISTANT

## 2022-05-24 ENCOUNTER — OFFICE VISIT (OUTPATIENT)
Dept: ORTHOPEDIC SURGERY | Facility: CLINIC | Age: 87
End: 2022-05-24

## 2022-05-24 DIAGNOSIS — S42.202S CLOSED FRACTURE OF PROXIMAL END OF LEFT HUMERUS, UNSPECIFIED FRACTURE MORPHOLOGY, SEQUELA: Primary | ICD-10-CM

## 2022-05-24 DIAGNOSIS — Z96.612 HISTORY OF TOTAL REPLACEMENT OF LEFT SHOULDER JOINT: ICD-10-CM

## 2022-05-24 DIAGNOSIS — I89.0 LYMPHEDEMA OF LEFT LOWER EXTREMITY: ICD-10-CM

## 2022-05-24 DIAGNOSIS — M79.89 PAIN AND SWELLING OF LEFT LOWER LEG: ICD-10-CM

## 2022-05-24 DIAGNOSIS — M79.662 PAIN AND SWELLING OF LEFT LOWER LEG: ICD-10-CM

## 2022-05-24 LAB
BH CV LOW VAS LEFT DISTAL FEMORAL SPONT: 1
BH CV LOW VAS LEFT MID FEMORAL SPONT: 1
BH CV LOW VAS LEFT POPLITEAL SPONT: 1
BH CV LOW VAS LEFT PROXIMAL FEMORAL SPONT: 1
BH CV LOWER VASCULAR LEFT COMMON FEMORAL AUGMENT: NORMAL
BH CV LOWER VASCULAR LEFT COMMON FEMORAL COMPRESS: NORMAL
BH CV LOWER VASCULAR LEFT COMMON FEMORAL PHASIC: NORMAL
BH CV LOWER VASCULAR LEFT COMMON FEMORAL SPONT: NORMAL
BH CV LOWER VASCULAR LEFT DISTAL FEMORAL AUGMENT: NORMAL
BH CV LOWER VASCULAR LEFT DISTAL FEMORAL COMPRESS: NORMAL
BH CV LOWER VASCULAR LEFT DISTAL FEMORAL PHASIC: NORMAL
BH CV LOWER VASCULAR LEFT DISTAL FEMORAL SPONT: NORMAL
BH CV LOWER VASCULAR LEFT DISTAL FEMORAL THROMBUS: NORMAL
BH CV LOWER VASCULAR LEFT GASTRONEMIUS COMPRESS: NORMAL
BH CV LOWER VASCULAR LEFT GREATER SAPH AK COMPRESS: NORMAL
BH CV LOWER VASCULAR LEFT GREATER SAPH BK COMPRESS: NORMAL
BH CV LOWER VASCULAR LEFT LESSER SAPH COMPRESS: NORMAL
BH CV LOWER VASCULAR LEFT MID FEMORAL AUGMENT: NORMAL
BH CV LOWER VASCULAR LEFT MID FEMORAL COMPRESS: NORMAL
BH CV LOWER VASCULAR LEFT MID FEMORAL PHASIC: NORMAL
BH CV LOWER VASCULAR LEFT MID FEMORAL SPONT: NORMAL
BH CV LOWER VASCULAR LEFT MID FEMORAL THROMBUS: NORMAL
BH CV LOWER VASCULAR LEFT PERONEAL COMPRESS: NORMAL
BH CV LOWER VASCULAR LEFT POPLITEAL AUGMENT: NORMAL
BH CV LOWER VASCULAR LEFT POPLITEAL COMPRESS: NORMAL
BH CV LOWER VASCULAR LEFT POPLITEAL PHASIC: NORMAL
BH CV LOWER VASCULAR LEFT POPLITEAL SPONT: NORMAL
BH CV LOWER VASCULAR LEFT POPLITEAL THROMBUS: NORMAL
BH CV LOWER VASCULAR LEFT POSTERIOR TIBIAL COMPRESS: NORMAL
BH CV LOWER VASCULAR LEFT PROFUNDA FEMORAL COMPRESS: NORMAL
BH CV LOWER VASCULAR LEFT PROXIMAL FEMORAL AUGMENT: NORMAL
BH CV LOWER VASCULAR LEFT PROXIMAL FEMORAL COMPRESS: NORMAL
BH CV LOWER VASCULAR LEFT PROXIMAL FEMORAL PHASIC: NORMAL
BH CV LOWER VASCULAR LEFT PROXIMAL FEMORAL SPONT: NORMAL
BH CV LOWER VASCULAR LEFT PROXIMAL FEMORAL THROMBUS: NORMAL
BH CV LOWER VASCULAR LEFT SAPHENOFEMORAL JUNCTION COMPRESS: NORMAL
BH CV LOWER VASCULAR LEFT SOLEAL COMPRESS: NORMAL
BH CV LOWER VASCULAR RIGHT COMMON FEMORAL AUGMENT: NORMAL
BH CV LOWER VASCULAR RIGHT COMMON FEMORAL COMPRESS: NORMAL
BH CV LOWER VASCULAR RIGHT COMMON FEMORAL PHASIC: NORMAL
BH CV LOWER VASCULAR RIGHT COMMON FEMORAL SPONT: NORMAL
BH CV LOWER VASCULAR RIGHT SAPHENOFEMORAL JUNCTION COMPRESS: NORMAL
MAXIMAL PREDICTED HEART RATE: 136 BPM
STRESS TARGET HR: 116 BPM

## 2022-05-24 PROCEDURE — 93971 EXTREMITY STUDY: CPT

## 2022-05-24 PROCEDURE — 93971 EXTREMITY STUDY: CPT | Performed by: INTERNAL MEDICINE

## 2022-05-24 PROCEDURE — 99213 OFFICE O/P EST LOW 20 MIN: CPT | Performed by: PHYSICIAN ASSISTANT

## 2022-05-24 NOTE — PROGRESS NOTES
[facility] Orthopaedic Surgery Clinic Note        Subjective     CC: Follow-up (1 week follow up - (DOI: 05/06/22) Hx of Total Shoulder Reverse Arthroplasty Left 10/19/2020)      LAINEY GARCIA Host is a 86 y.o. male. Patient returns for follow up left proximal humerus periprosthetic fracture. Last week provided a sling but patient did not use it. Feels his shoulder is doing better daily but having increased pain and swelling to left leg. Known history DVT and currently taking Eliquis.    Pain scale: 3/10. No numbness or tingling into left upper or lower extremity. Movement of knee and shoulder causes increased pain. Resting helps both.    Overall, patient's symptoms are better except for knee pain and lower leg swelling.    ROS:    Constiutional:Pt denies fever, chills, nausea, or vomiting.  MSK:as above        Objective      Past Medical History  Past Medical History:   Diagnosis Date   • Basal cell carcinoma    • Blood loss     post op   • ED (erectile dysfunction)     Responsive to Viagra   • Failed total knee, right (HCC)    • Fasting hypoglycemia 2016    Hemoglobin A1c normal   • Generalized osteoarthritis    • GERD (gastroesophageal reflux disease)     History esophageal stricture   • History of blood clots    • HNP (herniated nucleus pulposus), lumbar 1988, 2014    L5 L6   • Hyperglycemia     not diabetic, pt states hes never has high blood sugar per pt    • Hyperlipidemia    • Hypertension    • Microscopic colitis 2017    Positive biopsy - colonoscopy   • OAB (overactive bladder) 2000    Persistent urgency   • Obesity Adulthood    2012 body weight 244 BMI 32   • Painful total knee replacement (HCC)    • Paroxysmal atrial fibrillation (HCC) 1991    one episode - cardioverted   • Prostatism 2014   • Pulmonary embolism (HCC) 1991    after injury and protracted immobilization   • Right knee DJD 2015    Partial knee arthroplasty   • Shoulder dislocation     Repeated episodes   • Sleep disturbances 1990     Frequently requires medication   • Transient cerebral ischemia 2010, 2016    Negative medical workup on both occasions   • Venous stasis    • Vertigo    • Wears glasses          Physical Exam  There were no vitals taken for this visit.    There is no height or weight on file to calculate BMI.    Patient is well nourished and well developed.        Ortho Exam  Left shoulder  Skin: All compartments upper arm are soft and compressible.   Tenderness: Positive tenderness noted throughout the shoulder and upper arm but improved.  Motion: Range of motion of the shoulder not assessed.  Patient does have full range of motion of the elbow.     Left lower extremity  Skin: Swelling noted throughout leg. Leg girth has increased from last week.   Tenderness: Positive throughout leg.  TKA remains stable feeling.  Quadricep mechanism intact.  Motor/sensory: Grossly intact L2-S1.      Imaging/Labs/EMG Reviewed:  Ordered left shoulder plain films.  Imaging read/interpreted by Dr. Miranda.    Imaging Results (Last 24 Hours)     Procedure Component Value Units Date/Time    XR Shoulder 2+ View Left [[id number]] Resulted: 05/24/22 1525     Updated: 05/24/22 1526    Narrative:      Imaging: shoulder x-rays 3 views - AP, axillary, and scapular-Y x-ray   views    Side: Left shoulder    Indication for shoulder x-ray 3 views: shoulder pain    Comparison: preoperative and serial postoperative imaging    Findings:   Left proximal humeral periprosthetic fracture involving the greater   tuberosity with extension toward the medial calcar.  No displacement is   noted compared to prior.  Overall stable alignment of the periprosthetic   fracture compared to prior and no obvious shifting in the glenoid   component.  Shoulder is located.    I personally reviewed the above x-rays and discussed with the patient.          Assessment:  1. Closed fracture of proximal end of left humerus, unspecified fracture morphology, sequela    2. Pain and swelling of left  lower leg    3. Lymphedema of left lower extremity    4. History of total replacement of left shoulder joint        Plan:  1. Left periprosthetic proximal humerus fracture in setting of rTSA--stable. Continue to remain NWB to left upper extremity. OK for gentle ROM elbow, wrist, digits.  2. Pain and swelling of lower leg--send for duplex venous scan today. If positive will direct to PCP for treatment and management.  3. Lymphedema--patient has had soft tissue swelling present prior to recent injury. Has compression socks/hose but unable to wear, difficulty putting on and cut into skin. Recommend referral to lymphedema clinic if PCP agrees.   4. Follow up 3 weeks with repeat imaging of left shoulder. Needs to be on Tuesday or Thursday when Dr. Miranda also in clinic.  5. Questions and concerns answered.      Kisha Morin PA-C  05/24/22  19:54 EDT      Dictated Utilizing Dragon Dictation.

## 2022-06-08 ENCOUNTER — OFFICE VISIT (OUTPATIENT)
Dept: ORTHOPEDIC SURGERY | Facility: CLINIC | Age: 87
End: 2022-06-08

## 2022-06-08 VITALS — BODY MASS INDEX: 36.23 KG/M2 | HEIGHT: 73 IN | WEIGHT: 273.37 LBS

## 2022-06-08 DIAGNOSIS — S42.202A CLOSED FRACTURE OF PROXIMAL END OF LEFT HUMERUS, UNSPECIFIED FRACTURE MORPHOLOGY, INITIAL ENCOUNTER: Primary | ICD-10-CM

## 2022-06-08 PROCEDURE — 99214 OFFICE O/P EST MOD 30 MIN: CPT | Performed by: PHYSICIAN ASSISTANT

## 2022-06-10 NOTE — PROGRESS NOTES
[facility] Orthopaedic Surgery Clinic Note        Subjective     CC: New injury fall 6/7/2022) Hx of Total Shoulder Reverse Arthroplasty Left 10/19/2020))      LAINEY Lara is a 86 y.o. male.  Patient presents today with a new problem.  He sustained a fall on 6/7/2022 tripping over a curb getting out of the car.  He complains of increased shoulder pain.  He is status post reverse shoulder, left with Dr. Miranda in 10/19/2022 and 22 with subsequent periprosthetic fracture healing well in April.  Patient reports increased pain since this fall.  He has been using a sling.    Overall, patient's symptoms are worsening    ROS:    Constiutional:Pt denies fever, chills, nausea, or vomiting.  MSK:as above        Objective      Past Medical History  Past Medical History:   Diagnosis Date   • Basal cell carcinoma    • Blood loss     post op   • ED (erectile dysfunction)     Responsive to Viagra   • Failed total knee, right (HCC)    • Fasting hypoglycemia 2016    Hemoglobin A1c normal   • Generalized osteoarthritis    • GERD (gastroesophageal reflux disease)     History esophageal stricture   • History of blood clots    • HNP (herniated nucleus pulposus), lumbar 1988, 2014    L5 L6   • Hyperglycemia     not diabetic, pt states hes never has high blood sugar per pt    • Hyperlipidemia    • Hypertension    • Microscopic colitis 2017    Positive biopsy - colonoscopy   • OAB (overactive bladder) 2000    Persistent urgency   • Obesity Adulthood    2012 body weight 244 BMI 32   • Painful total knee replacement (HCC)    • Paroxysmal atrial fibrillation (HCC) 1991    one episode - cardioverted   • Prostatism 2014   • Pulmonary embolism (HCC) 1991    after injury and protracted immobilization   • Right knee DJD 2015    Partial knee arthroplasty   • Shoulder dislocation     Repeated episodes   • Sleep disturbances 1990    Frequently requires medication   • Transient cerebral ischemia 2010, 2016    Negative medical workup on  "both occasions   • Venous stasis    • Vertigo    • Wears glasses          Physical Exam  Ht 185.4 cm (72.99\")   Wt 124 kg (273 lb 5.9 oz)   BMI 36.07 kg/m²     Body mass index is 36.07 kg/m².    Patient is well nourished and well developed.        Ortho Exam  Left shoulder exam: Very tender palpation over the proximal humerus.  Patient has no tenderness in the elbow or wrist.  Neurovascular tact distally.      Assessment    Assessment:  1. Closed fracture of proximal end of left humerus, unspecified fracture morphology, initial encounter        Plan:  1. Recommend over the counter anti-inflammatories for pain and/or swelling  New left humerus periprosthetic fracture.  Patient is status post reverse total shoulder with Dr. Miranda in 10/20/2020.  He sustained a fracture approximately month ago and had a recent fall on 6/7/2022.  X-rays today show Left reverse shoulder arthroplasty the prior periprosthetic fracture approximately at the greater tuberosity shows evidence of healing with callus formation. After the new trauma  there is a subsequent essentially nondisplaced fracture at the tip of the  stem. It exits on the lateral cortex. There is no evidence of stem subsidence, the stem  appears to remain well fixed.  I reviewed the x-rays with the patient.  Plan today is that he use the a sling for the next 4 to 6 weeks.  He has an appointment already scheduled Dr. Miranda on 7/1/2022 and we will keep this appointment.  He will return sooner if needed.    History, diagnosis and treatment plan discussed with Dr. Miranda.      Day Brady PA-C  06/10/22  14:20 EDT      "

## 2022-07-01 ENCOUNTER — OFFICE VISIT (OUTPATIENT)
Dept: ORTHOPEDIC SURGERY | Facility: CLINIC | Age: 87
End: 2022-07-01

## 2022-07-01 VITALS
SYSTOLIC BLOOD PRESSURE: 128 MMHG | BODY MASS INDEX: 36.23 KG/M2 | DIASTOLIC BLOOD PRESSURE: 74 MMHG | WEIGHT: 273.37 LBS | HEIGHT: 73 IN

## 2022-07-01 DIAGNOSIS — I89.0 LYMPHEDEMA OF LEFT LOWER EXTREMITY: ICD-10-CM

## 2022-07-01 DIAGNOSIS — Z09 FRACTURE FOLLOW-UP: ICD-10-CM

## 2022-07-01 DIAGNOSIS — S42.202A CLOSED FRACTURE OF PROXIMAL END OF LEFT HUMERUS, UNSPECIFIED FRACTURE MORPHOLOGY, INITIAL ENCOUNTER: Primary | ICD-10-CM

## 2022-07-01 DIAGNOSIS — S42.202S CLOSED FRACTURE OF PROXIMAL END OF LEFT HUMERUS, UNSPECIFIED FRACTURE MORPHOLOGY, SEQUELA: ICD-10-CM

## 2022-07-01 PROCEDURE — 99213 OFFICE O/P EST LOW 20 MIN: CPT | Performed by: ORTHOPAEDIC SURGERY

## 2022-07-01 NOTE — PROGRESS NOTES
[facility] [place] [facility] Office Follow Up       Office Follow Up Visit       Patient Name: Alin Lara    Chief Complaint:   Chief Complaint   Patient presents with   • Follow-up     3 weeks- Closed fracture of proximal end of left humerus, unspecified fracture morphology       Referring Physician: No ref. provider found    History of Present Illness:   It has been 3  week(s) since Alin Lara's last visit. Alin Lara returns to clinic today for F/U: follow-up of leftBody Part: shoulderReason: pain. The issue has been ongoing for 3 week(s). Alin Lara rates HIS/HER: hispain at 4/10 on the pain scale. Previous/current treatments: cane/walker and NSAIDS. Current symptoms:Symptoms: pain and swelling. The pain is worse with standing, sleeping, lying on affected side and rising from seated position; resting and elevating the extremity improves the pain. Overall, he/she: heis doing better. I have reviewed the patient's history of present illness as noted/entered above.    I have reviewed the patient's past medical history, surgical history, social history, family history, medications, and allergies as noted in the electronic medical record and as noted/entered.  I have reviewed the patient's review of systems as noted/enter and updated as noted in the patient's HPI.      LEFT SHOULDER  7/1/2022:  Left shoulder he has had 2 separate periprosthetic fractures with 2 separate significant falls.  Counseled on interval healing and continue to protect his shoulder.    He said chronic lower extremity edema left more so than the right.  He has had multiple ultrasounds he has had chronic DVT changes.  He remains on anticoagulation for that.  We did have discussion about this today.  He is interested in physical therapy.  PT was ordered for the shoulder and perhaps they can assess the lower extremities as well given his gait, balance issues and recent  falls.  He has seen vascular surgery/CT surgery and his primary care team among others for the chronic lower extremity edema.  He notes he has been in discussion with his primary care team about this recently as well.  We will defer to his primary team and continue management over this over time as he is also had a prior total knee done on that same side at [facility].    [place] W[place] -progressing well he notes    He remains very active.    PRIOR:  10/19/2021:  Left shoulder has plateaued he feels somewhat worse she has been working on physical therapy with his lower extremity issues.   PT and notes and note with Dr. Bermudez reviewed.  He had his recent 2-year follow-up following his left total knee and he said lymphadenopathy and swelling of that lower extremity since the surgery on the knee.     Counseled on the left shoulder his radiographs appear stable compared to prior he is currently doing physical therapy for his lower extremity but not his shoulder.     Some shoulder soreness about 6 weeks ago.  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 Zamora for left venous insufficiency     Enjoyed  PT with Adrienne and did very well, his is interested in Outpatient [facility] at [facility]         Subjective   Subjective      Review of Systems   Constitutional: Negative.  Negative for chills, fatigue and fever.   HENT: Negative.  Negative for congestion and dental problem.    Eyes:  Negative.  Negative for blurred vision.   Respiratory: Negative.  Negative for shortness of breath.    Cardiovascular: Negative.  Negative for leg swelling.   Gastrointestinal: Negative.  Negative for abdominal pain.   Endocrine: Negative.  Negative for polyuria.   Genitourinary: Negative.  Negative for difficulty urinating.   Musculoskeletal: Positive for arthralgias.   Skin: Negative.    Allergic/Immunologic: Negative.    Neurological: Negative.    Hematological: Negative.  Negative for adenopathy.   Psychiatric/Behavioral: Negative.  Negative for behavioral problems.        Past Medical History:   Past Medical History:   Diagnosis Date   • Basal cell carcinoma    • Blood loss     post op   • ED (erectile dysfunction)     Responsive to Viagra   • Failed total knee, right (HCC)    • Fasting hypoglycemia 2016    Hemoglobin A1c normal   • Generalized osteoarthritis    • GERD (gastroesophageal reflux disease)     History esophageal stricture   • History of blood clots    • HNP (herniated nucleus pulposus), lumbar 1988, 2014    L5 L6   • Hyperglycemia     not diabetic, pt states hes never has high blood sugar per pt    • Hyperlipidemia    • Hypertension    • Microscopic colitis 2017    Positive biopsy - colonoscopy   • OAB (overactive bladder) 2000    Persistent urgency   • Obesity Adulthood    2012 body weight 244 BMI 32   • Painful total knee replacement (HCC)    • Paroxysmal atrial fibrillation (HCC) 1991    one episode - cardioverted   • Prostatism 2014   • Pulmonary embolism (HCC) 1991    after injury and protracted immobilization   • Right knee DJD 2015    Partial knee arthroplasty   • Shoulder dislocation     Repeated episodes   • Sleep disturbances 1990    Frequently requires medication   • Transient cerebral ischemia 2010, 2016    Negative medical workup on both occasions   • Venous stasis    • Vertigo    • Wears glasses        Past Surgical History:   Past Surgical History:   Procedure Laterality Date   •  CARDIOVERSION      Atrial fibrillation   • COLONOSCOPY      2018   • KNEE ARTHROPLASTY Right 2015    Partial    • KNEE ARTHROPLASTY Left    • KNEE ARTHROSCOPY Right 2015    Failed procedure   • LUMBAR DISC SURGERY  2014    Surgery ×2 Ruptured disc   • LUMBAR DISCECTOMY      L5 L6    • SHOULDER SURGERY     • TOTAL SHOULDER ARTHROPLASTY W/ DISTAL CLAVICLE EXCISION Left 10/19/2020    Procedure: TOTAL SHOULDER REVERSE ARTHROPLASTY LEFT;  Surgeon: Tan Miranda MD;  Location: [facility];  Service: Orthopedics;  Laterality: Left;       Family History:   Family History   Problem Relation Age of Onset   • Dementia Mother          age 94   • Other Father          age 86 of unknown cause   • Rheum arthritis Father    • Coronary artery disease Brother    • No Known Problems Brother    • Sick sinus syndrome Other         Has pacemaker   • Diabetes Paternal Grandmother        Social History:   Social History     Socioeconomic History   • Marital status:    • Number of children: 2   Tobacco Use   • Smoking status: Never Smoker   • Smokeless tobacco: Former User     Types: Chew     Quit date:    Vaping Use   • Vaping Use: Never used   Substance and Sexual Activity   • Alcohol use: Yes     Alcohol/week: 1.0 standard drink     Types: 1 Cans of beer per week     Comment: Occasionally   • Drug use: No   • Sexual activity: Yes     Partners: Female     Comment: Monogamous       Medications:   Current Outpatient Medications:   •  apixaban (ELIQUIS) 5 MG tablet tablet, Take 5 mg by mouth., Disp: , Rfl:   •  ascorbic acid (VITAMIN C) 1000 MG tablet, Take 1 capsule by mouth Daily., Disp: , Rfl:   •  atenolol (TENORMIN) 50 MG tablet, TAKE 1 TABLET BY MOUTH DAILY., Disp: 90 tablet, Rfl: 1  •  CALCIUM-MAGNESIUM-ZINC PO, Take 1 tablet by mouth Daily., Disp: , Rfl:   •  Cholecalciferol (VITAMIN D-3) 25 MCG (1000 UT) capsule, Take  by mouth., Disp: , Rfl:   •  Coenzyme Q10 200 MG capsule, Take 200 mg by mouth  "Daily., Disp: 90 capsule, Rfl: 1  •  docusate sodium (Colace) 100 MG capsule, Take 1 capsule by mouth 2 (Two) Times a Day., Disp: 60 capsule, Rfl: 0  •  Multiple Vitamins-Minerals (CENTRUM ADULTS) tablet, Take 1-2 tablets by mouth daily., Disp: , Rfl:   •  oxybutynin XL (DITROPAN-XL) 5 MG 24 hr tablet, Take 1 tablet by mouth Every 12 (Twelve) Hours., Disp: 180 tablet, Rfl: 1  •  rivaroxaban (XARELTO) 20 MG tablet, Take 1 tablet by mouth Daily. Resume 10/22/2020, Disp: 30 tablet, Rfl:   •  Ropivacine HCl-NaCl (NAROPIN), 12 mg/hr by Peripheral Nerve route Continuous., Disp:  , Rfl:   •  sildenafil (VIAGRA) 100 MG tablet, Take 0.5-1 tablets by mouth as needed., Disp: , Rfl:   •  tamsulosin (FLOMAX) 0.4 MG capsule 24 hr capsule, TAKE 1 CAPSULE EVERY 12 HOURS (Patient taking differently: 2 capsules every night at bedtime.), Disp: 180 capsule, Rfl: 1  •  Zinc 50 MG capsule, Take  by mouth., Disp: , Rfl:     Allergies:   Allergies   Allergen Reactions   • Pravastatin Other (See Comments)     Nocturnal leg cramps   • Atorvastatin      Fatigue   • Qsymia [Phentermine-Topiramate] Mental Status Change   • Topiramate Myalgia       The following portions of the patient's history were reviewed and updated as appropriate: allergies, current medications, past family history, past medical history, past social history, past surgical history and problem list.        Objective    Objective      Vital Signs:   Vitals:    07/01/22 1020   BP: 128/74   Weight: 124 kg (273 lb 5.9 oz)   Height: 185.4 cm (72.99\")       Ortho Exam:  Left shoulder demonstrates good passive range of motion minimal point tenderness despite known fractures.  He said some baseline swelling do to decreased function of the left arm and bleeding from the fracture but this appears to be improving.  Comfortable appearing today.    Results Review:  Imaging Results (Last 24 Hours)     Procedure Component Value Units Date/Time    XR Shoulder 2+ View Left [[id number]] " Resulted: 07/01/22 1217     Updated: 07/01/22 1219    Narrative:      Imaging: shoulder x-rays 3 views - AP, axillary, and scapular-Y x-ray   views    Side: Left shoulder    Indication for shoulder x-ray 3 views: shoulder pain    Comparison: Multiple serial comparison views available    Findings:   Left reverse shoulder arthroplasty remains in place.  No change in   alignment, shoulder located, baseplate appears stable compared to prior.    No obvious stem subsidence.  The patient has 2 separate   fractures/periprosthetic fractures that are noted.  The more distal   fracture has both a lateral cortical and now medial cortical finding but   both appear to show interval healing.  The more proximal fracture that was   prior shows interval callus formation.  Overall stable alignment   maintained.    I personally reviewed the above x-rays and discussed with the patient.        Procedures            Assessment / Plan      Assessment/Plan:   Problem List Items Addressed This Visit    None     Visit Diagnoses     Closed fracture of proximal end of left humerus, unspecified fracture morphology, initial encounter    -  Primary    Relevant Orders    Ambulatory Referral to Physical Therapy Evaluate and treat, Lymphedema    Fracture follow-up        Relevant Orders    XR Shoulder 2+ View Left (Completed)    Ambulatory Referral to Physical Therapy Evaluate and treat, Lymphedema    Lymphedema of left lower extremity        Relevant Orders    Ambulatory Referral to Physical Therapy Evaluate and treat, Lymphedema    Closed fracture of proximal end of left humerus, unspecified fracture morphology, sequela        Relevant Orders    Ambulatory Referral to Physical Therapy Evaluate and treat, Lymphedema        Left shoulder periprosthetic fracture x2 with multiple falls.  PT for balance gait, lymphedema assistance.  PT for upper extremity assistance as well in the setting of periprosthetic fractures.  Passive range of motion we will  hold on any strengthening until we get him 3 months out from these fractures.  He has transitioned out of the sling but seems to be doing well at this point.  Complex case with multiple recent falls he is using a cane now and I think PT will help with assistance with gait, balance, upper extremity improvements.    Follow Up: 6 weeks with left shoulder 3 views.      Tan Miranda MD, FAAOS  Orthopedic Surgeon  Fellowship Trained Shoulder and Elbow Surgeon  [facility]  Orthopedics and Sports Medicine  [address] [place] [address], [address]  [place] [address]    07/01/22  13:24 EDT

## 2022-07-12 ENCOUNTER — TREATMENT (OUTPATIENT)
Dept: PHYSICAL THERAPY | Facility: CLINIC | Age: 87
End: 2022-07-12

## 2022-07-12 DIAGNOSIS — R53.1 WEAKNESS: Primary | ICD-10-CM

## 2022-07-12 DIAGNOSIS — M79.89 SWELLING OF LEFT LOWER EXTREMITY: ICD-10-CM

## 2022-07-12 DIAGNOSIS — M25.512 ACUTE PAIN OF LEFT SHOULDER: ICD-10-CM

## 2022-07-12 DIAGNOSIS — M25.60 RANGE OF MOTION DEFICIT: ICD-10-CM

## 2022-07-12 PROCEDURE — 97110 THERAPEUTIC EXERCISES: CPT | Performed by: PHYSICAL THERAPIST

## 2022-07-12 PROCEDURE — 97162 PT EVAL MOD COMPLEX 30 MIN: CPT | Performed by: PHYSICAL THERAPIST

## 2022-07-12 NOTE — PROGRESS NOTES
Physical Therapy Initial Evaluation and Plan of Care    Patient: Alin Lara   : 1935  Diagnosis/ICD-10 Code:  No primary diagnosis found.  Referring practitioner: Tan Miranda MD  Date of Initial Visit: 2022  Today's Date: 2022  Patient seen for 1 session         Visit Diagnoses:  No diagnosis found.      Subjective Questionnaire: LEFS: 25      Subjective Evaluation    History of Present Illness  Mechanism of injury: The pt sustained a fall to the L side of his body on 22 and suffered a fracture to the proximal L humerus. He was prescribed a sling but did not use it. Unfortunately, he suffered an additional fall on 22 which caused an additional fracture to the proximal L humerus. Again, a sling was prescribed but not used. He has a history of a L rTSA but the prosthetic was not damaged with the fractures. He saw Dr. Miranda for a f/u on 22 and was referred to PT to work on PROM. His case is made more complex by the recent history of falls, imbalance, LE weakness, and chronic LE swelling/lymphedema.     The pt hopes to improve his L shoulder ROM and strength to pre-fracture levels. He feels he has already seen 50% improvement. He currently reports minimal pain at rest and moderate pain with any movement. He has been trying to perform pendulums that he remembered from a previous course of PT. He is not as concerned with his balance, LE swelling, or LE strength at this time. He had PT for this earlier this year but did not notice large improvement, though he did feel he got stronger.     Pain  Current pain ratin  At best pain ratin  At worst pain ratin  Location: L shoulder  Quality: throbbing and dull ache  Relieving factors: change in position  Aggravating factors: lifting, movement, outstretched reach, overhead activity and repetitive movement  Progression: improved    Social Support  Lives with: spouse    Hand dominance: right    Diagnostic Tests  Abnormal  x-ray: proximal humerus fx with no apparent prosthesis damage.    Patient Goals  Patient goals for therapy: decreased pain, increased strength, increased motion, improved balance, decreased edema and independence with ADLs/IADLs             Objective          Tenderness     Left Shoulder   Tenderness in the coracoid process. No tenderness in the biceps tendon (proximal), infraspinatus tendon, subscapularis tendon and supraspinatus tendon.     Active Range of Motion   Left Shoulder   Flexion: 51 degrees   Abduction: 57 degrees   External rotation 0°: 0 degrees   Internal rotation BTB: sacrum     Right Shoulder   Flexion: 128 degrees   Abduction: 105 degrees   External rotation 0°: 30 degrees   Internal rotation BTB: T10     Strength/Myotome Testing     Right Shoulder     Planes of Motion   Flexion: 5   Abduction: 4+   External rotation at 0°: 4   Internal rotation at 0°: 4+     Left Wrist/Hand      (2nd hand position)     Trial 1: 30 lbs    Trial 2: 30 lbs    Trial 3: 30 lbs    Average: 30 lbs    Right Wrist/Hand      (2nd hand position)     Trial 1: 50 lbs    Trial 2: 50 lbs    Trial 3: 50 lbs    Average: 50 lbs          Assessment & Plan     Assessment  Impairments: abnormal muscle firing, abnormal or restricted ROM, impaired physical strength, lacks appropriate home exercise program and pain with function  Functional Limitations: carrying objects, lifting, reaching behind back, reaching overhead and unable to perform repetitive tasks  Assessment details: The patient is an 87 yo male who presented to PT with evolving characteristics of acute L shoulder pain, ROM deficits, and weakness with moderate complexity. Signs and symptoms are consistent with a proximal humerus fracture. The case is made more complex by a history of a rTSA in 2021 and a recent history of another proximal humerus fracture. He has been non-compliant with sling use but seems to be doing relatively well overall. AROM was significantly  limited but was not accompanied by much pain. PROM was limited in all planes and was more painful at end ranges. Light PROM was performed today and tolerated moderately well, but he reported general achiness after treatment. Strength testing was deferred. He is likely to require several months of intervention to fully restore his ROM and strength to pre-injury status. He prefers to be more independent with his rehab, though has agreed to 2x/week for the first few weeks.     Prognosis: fair    Goals  Plan Goals: Short Term Goals (4 weeks):     1. The patient will be independent and compliant with initial HEP.     2. The patient will report pain at rest 0/10 or less and worst pain 3/10 or less.    3. The patient will display decreased TTP in the L proximal humerus and dec mm tension in the surrounding musculature.    4.  L shoulder AROM will improve to flex 70 deg, abd 70 deg, ER 15 deg, IR to scarum.    5. The patient will demonstrate inc strength evidenced by MMT as follows: flex 4-/5, abd 4-/5, ER 4-/5, and IR 4-/5.    6. Quick DASH will improve by 11 points or more.         Long Term Goals (8 weeks):     1. The patient will be appropriate for independent management and compliant with progressed HEP.     2. The patient will report pain at rest 0/10 or less and worst pain 2/10 or less.    3. L shoulder AROM will be 80 deg flex and abd, 20 deg ER, IR to L3.    4. The patient will return to work duties and/or ADLs with no limitations due to shoulder pain or dysfunction.    5. The patient will return to recreational and community activities with no limitations due to shoulder pain or dysfunction.      Plan  Therapy options: will be seen for skilled therapy services  Planned modality interventions: cryotherapy, iontophoresis, TENS, electrical stimulation/Russian stimulation and thermotherapy (hydrocollator packs)  Planned therapy interventions: ADL retraining, body mechanics training, flexibility, functional ROM  exercises, home exercise program, joint mobilization, manual therapy, neuromuscular re-education, postural training, soft tissue mobilization, strengthening, therapeutic activities and stretching  Frequency: 2x week  Duration in visits: 16  Duration in weeks: 8  Treatment plan discussed with: patient  Plan details: The patient will likely benefit from TE/TA/NMED to improve RC strength, UE proprioception, and scapular mobility. MT will be utilized in addition to stretching for improved GH jt mobility and AROM. Modalities will be used as needed for pain modulation and reduction of swelling.         History # of Personal Factors and/or Comorbidities: HIGH (3+)  Examination of Body System(s): # of elements: MODERATE (3)  Clinical Presentation: EVOLVING  Clinical Decision Making: MODERATE      Timed:         Manual Therapy:    0     mins  82628;     Therapeutic Exercise:    10     mins  41094;     Neuromuscular Lisbeth:    0    mins  26738;    Therapeutic Activity:     0     mins  72247;     Gait Trainin     mins  48553;     Ultrasound:     0     mins  03606;    Ionto                               0    mins   39736  Self Care                       0     mins   12498  Canalith Repos    0     mins 54076      Un-Timed:  Electrical Stimulation:    0     mins  54712 ( );  Dry Needling     0     mins self-pay  Traction     0     mins 85960  Low Eval     0     Mins  23795  Mod Eval     30     Mins  85393  High Eval                       0     Mins  98046        Timed Treatment:   10   mins   Total Treatment:     40   mins          PT: Jude Bradley PT     License Number: [id number]  Electronically signed by Jude Bradley PT, 22, 8:34 AM EDT    Certification Period: 2022 thru 10/9/2022  I certify that the therapy services are furnished while this patient is under my care.  The services outlined above are required by this patient, and will be reviewed every 90 days.         Physician  Signature:__________________________________________________    PHYSICIAN: Tan Miranda MD  NPI: [id number]                                      DATE:      Please sign and return via fax to [email or web address] . Thank you, [facility].

## 2022-07-18 ENCOUNTER — TREATMENT (OUTPATIENT)
Dept: PHYSICAL THERAPY | Facility: CLINIC | Age: 87
End: 2022-07-18

## 2022-07-18 DIAGNOSIS — R53.1 WEAKNESS: Primary | ICD-10-CM

## 2022-07-18 DIAGNOSIS — M25.60 RANGE OF MOTION DEFICIT: ICD-10-CM

## 2022-07-18 DIAGNOSIS — M25.512 ACUTE PAIN OF LEFT SHOULDER: ICD-10-CM

## 2022-07-18 PROCEDURE — 97110 THERAPEUTIC EXERCISES: CPT | Performed by: PHYSICAL THERAPIST

## 2022-07-18 PROCEDURE — 97140 MANUAL THERAPY 1/> REGIONS: CPT | Performed by: PHYSICAL THERAPIST

## 2022-07-18 NOTE — PROGRESS NOTES
Physical Therapy Daily Treatment Note      Patient: Alin Lara   : 1935  Referring practitioner: Tan Miranda MD  Date of Initial Visit: Type: THERAPY  Noted: 2022  Today's Date: 2022  Patient seen for 2 sessions       Visit Diagnoses:    ICD-10-CM ICD-9-CM   1. Weakness  R53.1 780.79   2. Range of motion deficit  M25.60 719.50   3. Acute pain of left shoulder  M25.512 719.41       Subjective Evaluation    History of Present Illness  Mechanism of injury: The pt stated that his shoulder pain has been improving with HEP performance but he continues to report soreness with activity. He stated that PROM was painful last visit. He took two Tylenol before his visit today.    Pain  Current pain ratin  Location: L shoulder           Objective          Passive Range of Motion   Left Shoulder   Flexion: 95 degrees   Abduction: 75 degrees       See Exercise, Manual, and Modality Logs for complete treatment.       Assessment & Plan     Assessment    Assessment details: PROM continues to elicit pain in the L shoulder, even at mid-ranges, and mm guarding is likely the source of some of this pain. He has healing fractures and a complex history of L shoulder dysfunction so some discomfort with rehab is expected and he is understanding of this. PROM and light joint mobs were performed today within his tolerance. Pulleys with passive elevation of the L shoulder were attempted but not tolerated well. Passive SB slides into flexion were performed instead and were tolerated better. His HEP was reviewed and he was educated on the importance of keeping the motions passive as able.     Plan  Plan details: Continue PROM exercises and MT within tolerance.           Timed:         Manual Therapy:    25     mins  37789;     Therapeutic Exercise:    13     mins  82747;     Neuromuscular Lisbeth:    0    mins  69523;    Therapeutic Activity:     0     mins  67700;     Gait Trainin     mins  39449;      Ultrasound:     0     mins  25030;    Ionto                               0    mins   97375  Self Care                       0     mins   72274  Canalith Repos    0     mins 59915      Un-Timed:  Electrical Stimulation:    0     mins  88381 ( );  Dry Needling     0     mins self-pay  Traction     0     mins 74526      Timed Treatment:   38   mins   Total Treatment:     40   mins    Jude Bradley, PT  [place] License: [id number]

## 2022-07-21 ENCOUNTER — TREATMENT (OUTPATIENT)
Dept: PHYSICAL THERAPY | Facility: CLINIC | Age: 87
End: 2022-07-21

## 2022-07-21 DIAGNOSIS — M25.512 ACUTE PAIN OF LEFT SHOULDER: ICD-10-CM

## 2022-07-21 DIAGNOSIS — R53.1 WEAKNESS: Primary | ICD-10-CM

## 2022-07-21 DIAGNOSIS — M25.60 RANGE OF MOTION DEFICIT: ICD-10-CM

## 2022-07-21 PROCEDURE — 97110 THERAPEUTIC EXERCISES: CPT | Performed by: PHYSICAL THERAPIST

## 2022-07-21 PROCEDURE — 97140 MANUAL THERAPY 1/> REGIONS: CPT | Performed by: PHYSICAL THERAPIST

## 2022-07-21 NOTE — PROGRESS NOTES
Physical Therapy Daily Treatment Note      Patient: Alin Lara   : 1935  Referring practitioner: Tan Miranda MD  Date of Initial Visit: Type: THERAPY  Noted: 2022  Today's Date: 2022  Patient seen for 3 sessions       Visit Diagnoses:    ICD-10-CM ICD-9-CM   1. Weakness  R53.1 780.79   2. Range of motion deficit  M25.60 719.50   3. Acute pain of left shoulder  M25.512 719.41       Subjective Evaluation    History of Present Illness  Mechanism of injury: The pt stated that his shoulder was achy after his last visit. He has been performing his HEP 3x/day and feels it is reducing his pain.    Pain  Current pain ratin  Location: L shoulder           Objective          Active Range of Motion     Additional Active Range of Motion Details  AAROM L shoulder flexion 95 deg    Passive Range of Motion   Left Shoulder   Flexion: 100 degrees   Abduction: 90 degrees       See Exercise, Manual, and Modality Logs for complete treatment.       Assessment & Plan     Assessment    Assessment details: The pt continues to experiencing significant soreness in the shoulder with PROM but no sharp pain. Passive motion reached 100 deg of elevation today following manual therapy and there was minimal discomfort at end-range. He has more pain with eccentric lowering than with PROM due to mm activation. AAROM flexion was introduced and tolerated well, but soreness is expected. He was encouraged to manage soreness with ice.    Plan  Plan details: Continue stretching and joint mobs. Add AAROM exercises to his HEP.          Timed:         Manual Therapy:    28     mins  95795;     Therapeutic Exercise:    10     mins  16530;     Neuromuscular Lisbeth:    0    mins  75577;    Therapeutic Activity:     0     mins  86120;     Gait Trainin     mins  16982;     Ultrasound:     0     mins  77574;    Ionto                               0    mins   64317  Self Care                       0     mins    51975  Canalith Repos    0     mins 18113      Un-Timed:  Electrical Stimulation:    0     mins  34794 ( );  Dry Needling     0     mins self-pay  Traction     0     mins 37024      Timed Treatment:   38   mins   Total Treatment:     38   mins    Jude Bradley, PT  [place] License: [id number]

## 2022-07-26 ENCOUNTER — TREATMENT (OUTPATIENT)
Dept: PHYSICAL THERAPY | Facility: CLINIC | Age: 87
End: 2022-07-26

## 2022-07-26 DIAGNOSIS — M25.512 ACUTE PAIN OF LEFT SHOULDER: ICD-10-CM

## 2022-07-26 DIAGNOSIS — M25.60 RANGE OF MOTION DEFICIT: ICD-10-CM

## 2022-07-26 DIAGNOSIS — R53.1 WEAKNESS: Primary | ICD-10-CM

## 2022-07-26 PROCEDURE — 97140 MANUAL THERAPY 1/> REGIONS: CPT | Performed by: PHYSICAL THERAPIST

## 2022-07-26 NOTE — PROGRESS NOTES
Physical Therapy Daily Treatment Note      Patient: Alin Lara   : 1935  Referring practitioner: Tan Miranda MD  Date of Initial Visit: Type: THERAPY  Noted: 2022  Today's Date: 2022  Patient seen for 4 sessions       Visit Diagnoses:    ICD-10-CM ICD-9-CM   1. Weakness  R53.1 780.79   2. Range of motion deficit  M25.60 719.50   3. Acute pain of left shoulder  M25.512 719.41       Subjective Evaluation    History of Present Illness  Mechanism of injury: The pt stated that his shoulder was sore following his last visit. He has remained compliant with his HEP and feels his exercises are going well.            Objective          Passive Range of Motion   Left Shoulder   Flexion: 100 degrees   Abduction: 95 degrees       See Exercise, Manual, and Modality Logs for complete treatment.       Assessment & Plan     Assessment    Assessment details: The pt continues to report soreness and aching in the L shoulder with PROM, stretching, and general activity, but he tolerated MT better today than he has in the past. This allowed for PROM up to 100 deg of elevation, which is near his ROM achieved following his rTSA in . Joint mobs and stretching were performed extensively today and were the sole treatment, as he was sore afterwards and is not ready for exercise progressions.    Plan  Plan details: Continue joint mobilizations, stretching, and introduce table slides.          Timed:         Manual Therapy:    39     mins  87289;     Therapeutic Exercise:    0     mins  21634;     Neuromuscular Lisbeth:    0    mins  60019;    Therapeutic Activity:     0     mins  18208;     Gait Trainin     mins  44329;     Ultrasound:     0     mins  38604;    Ionto                               0    mins   65609  Self Care                       0     mins   81749  Canalith Repos    0     mins 04672      Un-Timed:  Electrical Stimulation:    0     mins  78078 ( );  Dry Needling     0     mins  self-pay  Traction     0     mins 30224      Timed Treatment:   39   mins   Total Treatment:     39   mins    Jude Bradley, PT  [place] License: [id number]

## 2022-07-28 ENCOUNTER — TREATMENT (OUTPATIENT)
Dept: PHYSICAL THERAPY | Facility: CLINIC | Age: 87
End: 2022-07-28

## 2022-07-28 DIAGNOSIS — M25.512 ACUTE PAIN OF LEFT SHOULDER: ICD-10-CM

## 2022-07-28 DIAGNOSIS — R53.1 WEAKNESS: Primary | ICD-10-CM

## 2022-07-28 DIAGNOSIS — M25.60 RANGE OF MOTION DEFICIT: ICD-10-CM

## 2022-07-28 PROCEDURE — 97140 MANUAL THERAPY 1/> REGIONS: CPT | Performed by: PHYSICAL THERAPIST

## 2022-07-28 PROCEDURE — 97110 THERAPEUTIC EXERCISES: CPT | Performed by: PHYSICAL THERAPIST

## 2022-07-28 NOTE — PROGRESS NOTES
Physical Therapy Daily Treatment Note      Patient: Alin Lara   : 1935  Referring practitioner: Tan Miranda MD  Date of Initial Visit: Type: THERAPY  Noted: 2022  Today's Date: 2022  Patient seen for 5 sessions       Visit Diagnoses:    ICD-10-CM ICD-9-CM   1. Weakness  R53.1 780.79   2. Range of motion deficit  M25.60 719.50   3. Acute pain of left shoulder  M25.512 719.41       Subjective Evaluation    History of Present Illness  Mechanism of injury: The pt stated that his shoulder has been feeling better but continues to report pain with stretching and activity. He has remained compliant with his HEP.    Pain  Current pain ratin  Location: L shoulder            Objective   See Exercise, Manual, and Modality Logs for complete treatment.       Assessment & Plan     Assessment    Assessment details: The pt continues to show improved PROM each visit and 90+ degrees of elevation was easily achieved today. He was able to tolerate stretching to nearly 100 deg of abduction and flexion alike, which is more motion than he saw with rehab following his rTSA. His pec tendons are very tight and are TTP, so STM was performed to this region within his tolerance. He was encouraged to perform self massage as able. He tolerated pulley use for the first time today.    Plan  Plan details: Continue stretching, STM to the pecs, and pulley use.          Timed:         Manual Therapy:    30     mins  61439;     Therapeutic Exercise:    8     mins  59655;     Neuromuscular Lisbeth:    0    mins  33337;    Therapeutic Activity:     0     mins  40224;     Gait Trainin     mins  08696;     Ultrasound:     0     mins  92570;    Ionto                               0    mins   43107  Self Care                       0     mins   34165  Canalith Repos    0     mins 25881      Un-Timed:  Electrical Stimulation:    0     mins  35048 ( );  Dry Needling     0     mins self-pay  Traction     0     mins  93683      Timed Treatment:   38   mins   Total Treatment:     38   mins    Jude Bradley, PT  [place] License: [id number]

## 2022-08-01 ENCOUNTER — TREATMENT (OUTPATIENT)
Dept: PHYSICAL THERAPY | Facility: CLINIC | Age: 87
End: 2022-08-01

## 2022-08-01 DIAGNOSIS — M25.60 RANGE OF MOTION DEFICIT: ICD-10-CM

## 2022-08-01 DIAGNOSIS — M25.512 ACUTE PAIN OF LEFT SHOULDER: ICD-10-CM

## 2022-08-01 DIAGNOSIS — R53.1 WEAKNESS: Primary | ICD-10-CM

## 2022-08-01 PROCEDURE — 97140 MANUAL THERAPY 1/> REGIONS: CPT | Performed by: PHYSICAL THERAPIST

## 2022-08-01 NOTE — PROGRESS NOTES
Physical Therapy Daily Treatment Note      Patient: Alin Lara   : 1935  Referring practitioner: Tan Miranda MD  Date of Initial Visit: Type: THERAPY  Noted: 2022  Today's Date: 2022  Patient seen for 6 sessions       Visit Diagnoses:    ICD-10-CM ICD-9-CM   1. Weakness  R53.1 780.79   2. Range of motion deficit  M25.60 719.50   3. Acute pain of left shoulder  M25.512 719.41       Subjective Evaluation    History of Present Illness  Mechanism of injury: The pt stated that his shoulder has been feeling better and he noted he has been able to use it more throughout the day. He has stayed consistent with his PROM exercises and feels they help. He continues to report discomfort with stretching.     Pain  Current pain ratin  Location: L shoulder           Objective   See Exercise, Manual, and Modality Logs for complete treatment.       Assessment & Plan     Assessment    Assessment details: The pt reported that his shoulder has been feeling better but it appeared more irritable today than it has in recent visits. He was still able to tolerate stretching to 90 degrees but he was more guarded at end-range and we were not able to achieve the 100 degree sesar from last visit. He reported pain in the shoulder following MT but continued to tolerate pulleys afterwards. He was able to perform B table slides with little discomfort and was encouraged to perform this at home.    Plan  Plan details: Prescribe new stretches for HEP. Continue MT.          Timed:         Manual Therapy:    30     mins  63080;     Therapeutic Exercise:    0     mins  88942;     Neuromuscular Lisbeth:    0    mins  13853;    Therapeutic Activity:     0     mins  90383;     Gait Trainin     mins  97503;     Ultrasound:     0     mins  46950;    Ionto                               0    mins   99906  Self Care                       0     mins   30659  Canalith Repos    0     mins 59392      Un-Timed:  Electrical  Stimulation:    0     mins  22849 ( );  Dry Needling     0     mins self-pay  Traction     0     mins 37851      Timed Treatment:   30   mins   Total Treatment:     40   mins    Jude Bradley, PT  [place] License: [id number]

## 2022-08-04 ENCOUNTER — TREATMENT (OUTPATIENT)
Dept: PHYSICAL THERAPY | Facility: CLINIC | Age: 87
End: 2022-08-04

## 2022-08-04 DIAGNOSIS — R53.1 WEAKNESS: Primary | ICD-10-CM

## 2022-08-04 DIAGNOSIS — M25.512 ACUTE PAIN OF LEFT SHOULDER: ICD-10-CM

## 2022-08-04 DIAGNOSIS — M25.60 RANGE OF MOTION DEFICIT: ICD-10-CM

## 2022-08-04 PROCEDURE — 97140 MANUAL THERAPY 1/> REGIONS: CPT | Performed by: PHYSICAL THERAPIST

## 2022-08-04 PROCEDURE — 97110 THERAPEUTIC EXERCISES: CPT | Performed by: PHYSICAL THERAPIST

## 2022-08-04 NOTE — PROGRESS NOTES
Physical Therapy Daily Treatment Note      Patient: Alin Lara   : 1935  Referring practitioner: Tan Miranda MD  Date of Initial Visit: Type: THERAPY  Noted: 2022  Today's Date: 2022  Patient seen for 7 sessions       Visit Diagnoses:    ICD-10-CM ICD-9-CM   1. Weakness  R53.1 780.79   2. Range of motion deficit  M25.60 719.50   3. Acute pain of left shoulder  M25.512 719.41       Subjective Evaluation    History of Present Illness  Mechanism of injury: The pt stated that his shoulder has been feeling better and reported he has been able to use his R arm to open and close doors. He stated he has been working hard on his HEP and feels his motion improving.    Pain  Current pain ratin  Location: L shoulder           Objective          Passive Range of Motion   Left Shoulder   Flexion: 110 degrees   Abduction: 100 degrees       See Exercise, Manual, and Modality Logs for complete treatment.       Assessment & Plan     Assessment    Assessment details: The pt demonstrated significantly improved PROM of the L shoulder today and achieved as high as 110 deg flexion before onset of pain. He has been consistent with his HEP and is seeing consistent motion improvement as a result. His HEP was progressed to include an L stretch and ER stretch today, which were tolerated well in the clinic to end ranges.     Plan  Plan details: Continue PROM, stretching, joint mobs, and AAROM exercises.           Timed:         Manual Therapy:    30     mins  14916;     Therapeutic Exercise:    8     mins  23242;     Neuromuscular Lisbeth:    0    mins  72247;    Therapeutic Activity:     0     mins  04006;     Gait Trainin     mins  13773;     Ultrasound:     0     mins  24150;    Ionto                               0    mins   22660  Self Care                       0     mins   95079  Canalith Repos    0     mins 47227      Un-Timed:  Electrical Stimulation:    0     mins  72109 ( );  Dry  Needling     0     mins self-pay  Traction     0     mins 98511      Timed Treatment:   38   mins   Total Treatment:     42   mins    Jude Bradley, PT  [place] License: [id number]

## 2022-08-09 ENCOUNTER — TREATMENT (OUTPATIENT)
Dept: PHYSICAL THERAPY | Facility: CLINIC | Age: 87
End: 2022-08-09

## 2022-08-09 DIAGNOSIS — M25.60 RANGE OF MOTION DEFICIT: ICD-10-CM

## 2022-08-09 DIAGNOSIS — R53.1 WEAKNESS: Primary | ICD-10-CM

## 2022-08-09 DIAGNOSIS — M25.512 ACUTE PAIN OF LEFT SHOULDER: ICD-10-CM

## 2022-08-09 PROCEDURE — 97110 THERAPEUTIC EXERCISES: CPT | Performed by: PHYSICAL THERAPIST

## 2022-08-09 PROCEDURE — 97140 MANUAL THERAPY 1/> REGIONS: CPT | Performed by: PHYSICAL THERAPIST

## 2022-08-09 NOTE — PROGRESS NOTES
Physical Therapy Re Certification Of Plan of Care  Patient: Alin Lara   : 1935  Diagnosis/ICD-10 Code:  No primary diagnosis found.  Referring practitioner: Tan Miranda MD  Date of Initial Visit: Type: THERAPY  Noted: 2021  Today's Date: 2022  Patient seen for 18 sessions         Visit Diagnoses:  No diagnosis found.    Subjective Questionnaire: QuickDASH: 34  Clinical Progress: improved  Home Program Compliance: Yes  Treatment has included: therapeutic exercise, neuromuscular re-education, manual therapy and therapeutic activity      Subjective Evaluation    History of Present Illness  Mechanism of injury: The pt stated that his shoulder was sore following his last visit but he didn't feel the need to ice or use medication. He feels better overall and is pleased with his progress. He is no longer experiencing sharp pains during the day but reported feeling sore with most movements. He has been compliant with his new HEP and feels it is going very well. He will be seeing Dr. Miranda this Friday.     Pain  Current pain ratin  Location: L shoulder             Objective          Tenderness     Left Shoulder   No tenderness in the biceps tendon (proximal), coracoid process, infraspinatus tendon, subscapularis tendon and supraspinatus tendon.     Active Range of Motion   Left Shoulder   Flexion: 72 degrees   Abduction: 73 degrees   External rotation 0°: 15 degrees   Internal rotation BTB: L3     Right Shoulder   Flexion: 128 degrees   Abduction: 105 degrees   External rotation 0°: 30 degrees   Internal rotation BTB: T10     Passive Range of Motion   Left Shoulder   Flexion: 107 degrees   Abduction: 100 degrees     Strength/Myotome Testing     Right Shoulder     Planes of Motion   Flexion: 5   Abduction: 4+   External rotation at 0°: 4   Internal rotation at 0°: 4+     Left Wrist/Hand      (2nd hand position)     Trial 1: 30 lbs    Trial 2: 30 lbs    Trial 3: 30 lbs    Average: 30  lbs    Right Wrist/Hand      (2nd hand position)     Trial 1: 50 lbs    Trial 2: 50 lbs    Trial 3: 50 lbs    Average: 50 lbs          Assessment & Plan     Assessment  Impairments: abnormal muscle firing, abnormal or restricted ROM, impaired physical strength, lacks appropriate home exercise program and pain with function  Functional Limitations: carrying objects, lifting, reaching behind back, reaching overhead and unable to perform repetitive tasks  Assessment details: The patient has made good progress with initial PT interventions for L shoulder dysfunction secondary to multiple proximal humerus fx. Interventions have been largely passive, per physician order, and have focused specifically on motion restoration. Passively, he now has more ROM in the L shoulder than he did following rehab from his rTSA in 2021. He lacks the strength to achieve this motion actively, but strengthening has not been initiated. He continues to report pain and soreness with end-range movements but this is improving each week. He has not used a sling at any point since the fracture, against phsyician and PT recommendations, but appears to be doing well nonetheless. He will return to Dr. Miranda this week for reassessment and repeat imaging. If healing is progressing as expected, we may initiate strengthening next visit.     Additionally, LE swelling has also improved with recommendations to use compression stockings and prescribed calf raises to improve circulation.   Prognosis: fair    Goals  Plan Goals: Short Term Goals (4 weeks):     1. The patient will be independent and compliant with initial HEP. Met     2. The patient will report pain at rest 0/10 or less and worst pain 3/10 or less. Met     3. The patient will display decreased TTP in the L proximal humerus and dec mm tension in the surrounding musculature. Met    4.  L shoulder AROM will improve to flex 70 deg, abd 70 deg, ER 15 deg, IR to scarum. Met     5. The patient  will demonstrate inc strength evidenced by MMT as follows: flex 4-/5, abd 4-/5, ER 4-/5, and IR 4-/5. Not assessed    6. Quick DASH will improve by 11 points or more. Ongoing         Long Term Goals (8 weeks):     1. The patient will be appropriate for independent management and compliant with progressed HEP. Ongoing     2. The patient will report pain at rest 0/10 or less and worst pain 2/10 or less. Ongoing    3. L shoulder AROM will be 80 deg flex and abd, 20 deg ER, IR to L3. Ongoing     4. The patient will return to work duties and/or ADLs with no limitations due to shoulder pain or dysfunction. Ongoing     5. The patient will return to recreational and community activities with no limitations due to shoulder pain or dysfunction. Ongoing       Plan  Therapy options: will be seen for skilled therapy services  Planned modality interventions: cryotherapy, iontophoresis, TENS, electrical stimulation/Russian stimulation and thermotherapy (hydrocollator packs)  Planned therapy interventions: ADL retraining, body mechanics training, flexibility, functional ROM exercises, home exercise program, joint mobilization, manual therapy, neuromuscular re-education, postural training, soft tissue mobilization, strengthening, therapeutic activities and stretching  Frequency: 2x week  Duration in visits: 16  Duration in weeks: 8  Treatment plan discussed with: patient  Plan details: Initiate strengthening if radiographs confirm normal healing process. Continue MT and stretching.           Recommendations: Continue as planned  Timeframe: 2 months  Prognosis to achieve goals: good      Timed:         Manual Therapy:    30     mins  46521;     Therapeutic Exercise:    8     mins  12781;     Neuromuscular Lisbeth:    0    mins  62339;    Therapeutic Activity:     0     mins  17575;     Gait Trainin     mins  32254;     Ultrasound:     0     mins  57241;    Ionto                               0    mins   51184  Self Care                        0     mins   98001  Canalith Repos    0     mins 74207      Un-Timed:  Electrical Stimulation:    0     mins  26751 ( );  Dry Needling     0     mins self-pay  Traction     0     mins 97404  Re-Eval                           0    mins  09923      Timed Treatment:   38   mins   Total Treatment:     41   mins          PT: MYRANDA Mcconnell License:  [id number]    Electronically signed by Jude Bradley PT, 08/09/22, 8:22 AM EDT    Certification Period: 8/9/2022 thru 11/6/2022  I certify that the therapy services are furnished while this patient is under my care.  The services outlined above are required by this patient, and will be reviewed every 90 days.         Physician Signature:__________________________________________________    PHYSICIAN: Tan Miranda MD  NPI: [id number]                                      DATE:  :     Please sign and return via fax to [email or web address] . Thank you, [facility]

## 2022-08-12 ENCOUNTER — OFFICE VISIT (OUTPATIENT)
Dept: ORTHOPEDIC SURGERY | Facility: CLINIC | Age: 87
End: 2022-08-12

## 2022-08-12 VITALS
BODY MASS INDEX: 35.65 KG/M2 | SYSTOLIC BLOOD PRESSURE: 130 MMHG | DIASTOLIC BLOOD PRESSURE: 75 MMHG | HEIGHT: 73 IN | WEIGHT: 269 LBS

## 2022-08-12 DIAGNOSIS — Z09 FRACTURE FOLLOW-UP: Primary | ICD-10-CM

## 2022-08-12 DIAGNOSIS — S42.202A CLOSED FRACTURE OF PROXIMAL END OF LEFT HUMERUS, UNSPECIFIED FRACTURE MORPHOLOGY, INITIAL ENCOUNTER: ICD-10-CM

## 2022-08-12 DIAGNOSIS — Z96.612 STATUS POST REVERSE TOTAL SHOULDER REPLACEMENT, LEFT: ICD-10-CM

## 2022-08-12 PROCEDURE — 99213 OFFICE O/P EST LOW 20 MIN: CPT | Performed by: ORTHOPAEDIC SURGERY

## 2022-08-12 NOTE — PROGRESS NOTES
[facility] Office Follow Up       Office Follow Up Visit       Patient Name: Alin Lara    Chief Complaint:   Chief Complaint   Patient presents with   • Follow-up     6 weeks- Closed fracture of proximal end of left humerus       Referring Physician: No ref. provider found    History of Present Illness:   It has been 6  week(s) since Alin Lara's last visit. Alin Lara returns to clinic today for F/U: follow-up of leftBody Part: humerusReason: fracture. The issue has been ongoing for 9 week(s). Alin Lara rates HIS/HER: hispain at 3/10 on the pain scale. Previous/current treatments: bracing, cane/walker and physical therapy. Current symptoms:Symptoms: same as prior visit. The pain is worse with any movement of the joint; resting and physical therapy improves the pain. Overall, he/she: heis doing better. I have reviewed the patient's history of present illness as noted/entered above.    I have reviewed the patient's past medical history, surgical history, social history, family history, medications, and allergies as noted in the electronic medical record and as noted/entered.  I have reviewed the patient's review of systems as noted/enter and updated as noted in the patient's HPI.      LEFT SHOULDER  7/1/2022:  Left shoulder he has had 2 separate periprosthetic fractures with 2 separate significant falls.  Counseled on interval healing and continue to protect his shoulder.     He said chronic lower extremity edema left more so than the right.  He has had multiple ultrasounds he has had chronic DVT changes.  He remains on anticoagulation for that.  We did have discussion about this today.  He is interested in physical therapy.  PT was ordered for the shoulder and perhaps they can assess the lower extremities as well given his gait, balance issues and recent falls.  He has seen vascular surgery/CT surgery and his primary care  team among others for the chronic lower extremity edema.  He notes he has been in discussion with his primary care team about this recently as well.  We will defer to his primary team and continue management over this over time as he is also had a prior total knee done on that same side at [facility].     [place] W[place] -progressing well he notes     He remains very active.     PRIOR:  10/19/2021:  Left shoulder has plateaued he feels somewhat worse she has been working on physical therapy with his lower extremity issues.   PT and notes and note with Dr. Bermudez reviewed.  He had his recent 2-year follow-up following his left total knee and he said lymphadenopathy and swelling of that lower extremity since the surgery on the knee.     Counseled on the left shoulder his radiographs appear stable compared to prior he is currently doing physical therapy for his lower extremity but not his shoulder.     Some shoulder soreness about 6 weeks ago.  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 Zamora for left venous insufficiency     Enjoyed  PT with Adrienne and did very well, his is interested in Outpatient PT at [facility]        8/12/2022:  LEFT SHOULDER  Mr. Lara suffered 2 separate falls which resulted in a more proximal periprosthetic fracture that has effectively healed in a more distal fracture which is still in the healing process.  The second fracture more distal was noted on  radiographs 6/8/2022 after a second fall  He is now 2+ months out from that he is doing physical therapy with Jude Bradley is very pleased and making significant gains.    Overall remains quite stoic improved/better        Subjective   Subjective      Review of Systems   Constitutional: Negative.  Negative for chills, fatigue and fever.   HENT: Negative.  Negative for congestion and dental problem.    Eyes: Negative.  Negative for blurred vision.   Respiratory: Negative.  Negative for shortness of breath.    Cardiovascular: Negative.  Negative for leg swelling.   Gastrointestinal: Negative.  Negative for abdominal pain.   Endocrine: Negative.  Negative for polyuria.   Genitourinary: Negative.  Negative for difficulty urinating.   Musculoskeletal: Positive for arthralgias.   Skin: Negative.    Allergic/Immunologic: Negative.    Neurological: Negative.    Hematological: Negative.  Negative for adenopathy.   Psychiatric/Behavioral: Negative.  Negative for behavioral problems.        Past Medical History:   Past Medical History:   Diagnosis Date   • Basal cell carcinoma    • Blood loss     post op   • ED (erectile dysfunction)     Responsive to Viagra   • Failed total knee, right (HCC)    • Fasting hypoglycemia 2016    Hemoglobin A1c normal   • Generalized osteoarthritis    • GERD (gastroesophageal reflux disease)     History esophageal stricture   • History of blood clots    • HNP (herniated nucleus pulposus), lumbar 1988, 2014    L5 L6   • Hyperglycemia     not diabetic, pt states hes never has high blood sugar per pt    • Hyperlipidemia    • Hypertension    • Microscopic colitis 2017    Positive biopsy - colonoscopy   • OAB (overactive bladder) 2000    Persistent urgency   • Obesity Adulthood    2012 body weight 244 BMI 32   • Painful total knee replacement (HCC)    • Paroxysmal atrial fibrillation (HCC) 1991    one episode - cardioverted   • Prostatism 2014   • Pulmonary embolism (HCC) 1991    after injury and  protracted immobilization   • Right knee DJD     Partial knee arthroplasty   • Shoulder dislocation     Repeated episodes   • Sleep disturbances     Frequently requires medication   • Transient cerebral ischemia ,     Negative medical workup on both occasions   • Venous stasis    • Vertigo    • Wears glasses        Past Surgical History:   Past Surgical History:   Procedure Laterality Date   • CARDIOVERSION      Atrial fibrillation   • COLONOSCOPY      2018   • KNEE ARTHROPLASTY Right 2015    Partial    • KNEE ARTHROPLASTY Left    • KNEE ARTHROSCOPY Right 2015    Failed procedure   • LUMBAR DISC SURGERY  2014    Surgery ×2 Ruptured disc   • LUMBAR DISCECTOMY      L5 L6    • SHOULDER SURGERY     • TOTAL SHOULDER ARTHROPLASTY W/ DISTAL CLAVICLE EXCISION Left 10/19/2020    Procedure: TOTAL SHOULDER REVERSE ARTHROPLASTY LEFT;  Surgeon: Tan Miranda MD;  [facility]: [facility];  Service: Orthopedics;  Laterality: Left;       Family History:   Family History   Problem Relation Age of Onset   • Dementia Mother          age 94   • Other Father          age 86 of unknown cause   • Rheum arthritis Father    • Coronary artery disease Brother    • No Known Problems Brother    • Sick sinus syndrome Other         Has pacemaker   • Diabetes Paternal Grandmother        Social History:   Social History     Socioeconomic History   • Marital status:    • Number of children: 2   Tobacco Use   • Smoking status: Never Smoker   • Smokeless tobacco: Former User     Types: Chew     Quit date:    Vaping Use   • Vaping Use: Never used   Substance and Sexual Activity   • Alcohol use: Yes     Alcohol/week: 1.0 standard drink     Types: 1 Cans of beer per week     Comment: Occasionally   • Drug use: No   • Sexual activity: Yes     Partners: Female     Comment: Monogamous       Medications:   Current Outpatient Medications:   •  apixaban (ELIQUIS) 5 MG tablet tablet, Take 5 mg by mouth., Disp: ,  "Rfl:   •  ascorbic acid (VITAMIN C) 1000 MG tablet, Take 1 capsule by mouth Daily., Disp: , Rfl:   •  atenolol (TENORMIN) 50 MG tablet, TAKE 1 TABLET BY MOUTH DAILY., Disp: 90 tablet, Rfl: 1  •  CALCIUM-MAGNESIUM-ZINC PO, Take 1 tablet by mouth Daily., Disp: , Rfl:   •  Cholecalciferol (VITAMIN D-3) 25 MCG (1000 UT) capsule, Take  by mouth., Disp: , Rfl:   •  Coenzyme Q10 200 MG capsule, Take 200 mg by mouth Daily., Disp: 90 capsule, Rfl: 1  •  docusate sodium (Colace) 100 MG capsule, Take 1 capsule by mouth 2 (Two) Times a Day., Disp: 60 capsule, Rfl: 0  •  Multiple Vitamins-Minerals (CENTRUM ADULTS) tablet, Take 1-2 tablets by mouth daily., Disp: , Rfl:   •  oxybutynin XL (DITROPAN-XL) 5 MG 24 hr tablet, Take 1 tablet by mouth Every 12 (Twelve) Hours., Disp: 180 tablet, Rfl: 1  •  rivaroxaban (XARELTO) 20 MG tablet, Take 1 tablet by mouth Daily. Resume 10/22/2020, Disp: 30 tablet, Rfl:   •  Ropivacine HCl-NaCl (NAROPIN), 12 mg/hr by Peripheral Nerve route Continuous., Disp:  , Rfl:   •  sildenafil (VIAGRA) 100 MG tablet, Take 0.5-1 tablets by mouth as needed., Disp: , Rfl:   •  tamsulosin (FLOMAX) 0.4 MG capsule 24 hr capsule, TAKE 1 CAPSULE EVERY 12 HOURS (Patient taking differently: 2 capsules every night at bedtime.), Disp: 180 capsule, Rfl: 1  •  Zinc 50 MG capsule, Take  by mouth., Disp: , Rfl:     Allergies:   Allergies   Allergen Reactions   • Pravastatin Other (See Comments)     Nocturnal leg cramps   • Atorvastatin      Fatigue   • Qsymia [Phentermine-Topiramate] Mental Status Change   • Topiramate Myalgia       The following portions of the patient's history were reviewed and updated as appropriate: allergies, current medications, past family history, past medical history, past social history, past surgical history and problem list.        Objective    Objective      Vital Signs:   Vitals:    08/12/22 0757   BP: 130/75   Weight: 122 kg (269 lb)   Height: 185.4 cm (72.99\")       Ortho Exam:  Active and " passive range of motion continue to improve he does not have pain at the fracture site mild soreness and good strength.    Results Review:  Imaging Results (Last 24 Hours)     Procedure Component Value Units Date/Time    XR Shoulder 2+ View Left [[id number]] Resulted: 08/12/22 0845     Updated: 08/12/22 0846    Narrative:      Imaging: shoulder x-rays 3 views - AP, axillary, and scapular-Y x-ray   views    Side: LEFT SHOULDER    Indication for shoulder x-ray 3 views: shoulder pain    Comparison: Serial postoperative and post injury imaging    Findings:   Left shoulder 3 views show stable position of the humeral stem and glenoid   component.  The new images show the complexity of his fracture which was   distal to the stem but also proximal as well.  He had 2 separate fractures   the one more proximal and lateral portion has robust healing he has fairly   robust callus formation both medially and laterally to span across the tip   of the stem.  Overall stable alignment and interval healing is noted.    I personally reviewed the above x-rays and discussed with the patient.            Procedures          Assessment / Plan      Assessment/Plan:   Problem List Items Addressed This Visit        Musculoskeletal and Injuries    Status post reverse total shoulder replacement, left    Relevant Orders    Ambulatory Referral to Physical Therapy    Fracture follow-up - Primary    Relevant Orders    XR Shoulder 2+ View Left (Completed)    Ambulatory Referral to Physical Therapy    Closed fracture of left proximal humerus    Relevant Orders    Ambulatory Referral to Physical Therapy          LEFT SHOULDER periprosthetic fractures x2 with interval healing  He is 2+ months out from his second fracture the proximal 1 appears to have had robust healing the second 1 involves the tip of the stem area so we will continue to follow closely he will continue with physical therapy approximately 1 time per week moving forward to continue to  preserve his active and passive range of motion.  He will continue to protect the arm as able he is making significant gains    Follow Up: 6 weeks with Left shoulder 3 views      Tan Miranda MD, FAAOS  Orthopedic Surgeon  Fellowship Trained Shoulder and Elbow Surgeon  [facility]  Orthopedics and Sports Medicine  [address] [place] [address], [address]  [place] [address]    08/12/22  13:56 EDT

## 2022-08-16 ENCOUNTER — TREATMENT (OUTPATIENT)
Dept: PHYSICAL THERAPY | Facility: CLINIC | Age: 87
End: 2022-08-16

## 2022-08-16 DIAGNOSIS — R53.1 WEAKNESS: Primary | ICD-10-CM

## 2022-08-16 DIAGNOSIS — M25.512 ACUTE PAIN OF LEFT SHOULDER: ICD-10-CM

## 2022-08-16 DIAGNOSIS — M25.60 RANGE OF MOTION DEFICIT: ICD-10-CM

## 2022-08-16 PROCEDURE — 97110 THERAPEUTIC EXERCISES: CPT | Performed by: PHYSICAL THERAPIST

## 2022-08-16 PROCEDURE — 97140 MANUAL THERAPY 1/> REGIONS: CPT | Performed by: PHYSICAL THERAPIST

## 2022-08-16 NOTE — PROGRESS NOTES
Physical Therapy Daily Treatment Note      Patient: Alin Lara   : 1935  Referring practitioner: Tan Miranda MD  Date of Initial Visit: Type: THERAPY  Noted: 2022  Today's Date: 2022  Patient seen for 8 sessions       Visit Diagnoses:    ICD-10-CM ICD-9-CM   1. Weakness  R53.1 780.79   2. Range of motion deficit  M25.60 719.50   3. Acute pain of left shoulder  M25.512 719.41       Subjective Evaluation    History of Present Illness  Mechanism of injury: The pt stated that his shoulder has been feeling much better and expressed satisfaction with the trajectory of his rehab. He continues to report discomfort with stretching but feels it is necessary. He had a f/u with Dr. Miranda and repeat radiographs were performed, revealing healing of the proximal two fractures and delayed healing of the most distal fracture.     Pain  Current pain ratin  Location: L shoulder           Objective          Tenderness     Left Shoulder   No tenderness in the biceps tendon (proximal), coracoid process, infraspinatus tendon, subscapularis tendon and supraspinatus tendon.     Active Range of Motion   Left Shoulder   Flexion: 72 degrees   Abduction: 73 degrees   External rotation 0°: 15 degrees   Internal rotation BTB: L3     Right Shoulder   Flexion: 128 degrees   Abduction: 105 degrees   External rotation 0°: 30 degrees   Internal rotation BTB: T10     Passive Range of Motion   Left Shoulder   Flexion: 107 degrees   Abduction: 100 degrees     Strength/Myotome Testing     Right Shoulder     Planes of Motion   Flexion: 5   Abduction: 4+   External rotation at 0°: 4   Internal rotation at 0°: 4+     Left Wrist/Hand      (2nd hand position)     Trial 1: 30 lbs    Trial 2: 30 lbs    Trial 3: 30 lbs    Average: 30 lbs    Right Wrist/Hand      (2nd hand position)     Trial 1: 50 lbs    Trial 2: 50 lbs    Trial 3: 50 lbs    Average: 50 lbs      See Exercise, Manual, and Modality Logs for complete  treatment.       Assessment & Plan     Assessment    Assessment details: The pt reported further reduction in pain since his last visit and he tolerated stretching through end ranges well, with less discomfort than previous visits. He has been doing well with his HEP for PROM and light stretching, so deltoid assist flexion was introduced today as a method for light strengthening. He tolerated this well in the clinic but was noticeably fatigued by the end of treatment.     Plan  Plan details: Continue stretching through end ranges and progress light strengthening exercises as tolerated.          Timed:         Manual Therapy:    25     mins  33910;     Therapeutic Exercise:    13     mins  09362;     Neuromuscular Lisbeth:    0    mins  39196;    Therapeutic Activity:     0     mins  48691;     Gait Trainin     mins  86071;     Ultrasound:     0     mins  72711;    Ionto                               0    mins   43874  Self Care                       0     mins   68016  Canalith Repos    0     mins 92705      Un-Timed:  Electrical Stimulation:    0     mins  46399 ( );  Dry Needling     0     mins self-pay  Traction     0     mins 38364      Timed Treatment:   38   mins   Total Treatment:     45   mins    Jude Bradley PT  [place] License: [id number]

## 2022-08-24 ENCOUNTER — TREATMENT (OUTPATIENT)
Dept: PHYSICAL THERAPY | Facility: CLINIC | Age: 87
End: 2022-08-24

## 2022-08-24 DIAGNOSIS — M25.60 RANGE OF MOTION DEFICIT: ICD-10-CM

## 2022-08-24 DIAGNOSIS — R53.1 WEAKNESS: Primary | ICD-10-CM

## 2022-08-24 DIAGNOSIS — M25.512 ACUTE PAIN OF LEFT SHOULDER: ICD-10-CM

## 2022-08-24 PROCEDURE — 97110 THERAPEUTIC EXERCISES: CPT | Performed by: PHYSICAL THERAPIST

## 2022-08-24 PROCEDURE — 97140 MANUAL THERAPY 1/> REGIONS: CPT | Performed by: PHYSICAL THERAPIST

## 2022-08-24 NOTE — PROGRESS NOTES
Physical Therapy Daily Treatment Note      Patient: Alin Lara   : 1935  Referring practitioner: Tan Miranda MD  Date of Initial Visit: Type: THERAPY  Noted: 2022  Today's Date: 2022  Patient seen for 9 sessions       Visit Diagnoses:    ICD-10-CM ICD-9-CM   1. Weakness  R53.1 780.79   2. Range of motion deficit  M25.60 719.50   3. Acute pain of left shoulder  M25.512 719.41       Subjective Evaluation    History of Present Illness  Mechanism of injury: The pt stated that he has had very little pain in the shoulder in the last week. He began performing manually resisted deltoid assisted flexion after his last visit and feels it is going well. He has noticed he is able to use his shoulder for more of his daily tasks.     Pain  Current pain ratin  Location: L shoulder           Objective   See Exercise, Manual, and Modality Logs for complete treatment.       Assessment & Plan     Assessment    Assessment details: The pt continues to have little pain in the L shoulder during the day and is tolerating general activity better each week. He continues to have discomfort with end-range stretching, which is expected. Light strengthening exercises were introduced last week and were continued today with no complaints of pain. He should see improved function and available ROM with ongoing strengthening.     Plan  Plan details: Continue stretching through end ranges to improve ROM. Strengthening as tolerated to improve functional motion.           Timed:         Manual Therapy:    25     mins  95157;     Therapeutic Exercise:    13     mins  45914;     Neuromuscular Lisbeth:    0    mins  02326;    Therapeutic Activity:     0     mins  94619;     Gait Trainin     mins  30401;     Ultrasound:     0     mins  47157;    Ionto                               0    mins   46735  Self Care                       0     mins   24182  Canalith Repos    0     mins 37054      Un-Timed:  Electrical  Stimulation:    0     mins  08039 ( );  Dry Needling     0     mins self-pay  Traction     0     mins 04219      Timed Treatment:   38   mins   Total Treatment:     43   mins    Jude Bradley, PT  [place] License: [id number]

## 2022-09-07 ENCOUNTER — TREATMENT (OUTPATIENT)
Dept: PHYSICAL THERAPY | Facility: CLINIC | Age: 87
End: 2022-09-07

## 2022-09-07 DIAGNOSIS — M25.512 ACUTE PAIN OF LEFT SHOULDER: ICD-10-CM

## 2022-09-07 DIAGNOSIS — R53.1 WEAKNESS: Primary | ICD-10-CM

## 2022-09-07 DIAGNOSIS — M25.60 RANGE OF MOTION DEFICIT: ICD-10-CM

## 2022-09-07 PROCEDURE — 97140 MANUAL THERAPY 1/> REGIONS: CPT | Performed by: PHYSICAL THERAPIST

## 2022-09-07 NOTE — PROGRESS NOTES
Physical Therapy Re Certification Of Plan of Care  Patient: Alin Lara   : 1935  Diagnosis/ICD-10 Code:  Weakness [R53.1]  Referring practitioner: Tan Miranda MD  Date of Initial Visit: Type: THERAPY  Noted: 2022  Today's Date: 2022  Patient seen for 10 sessions         Visit Diagnoses:    ICD-10-CM ICD-9-CM   1. Weakness  R53.1 780.79   2. Range of motion deficit  M25.60 719.50   3. Acute pain of left shoulder  M25.512 719.41       Subjective Questionnaire: QuickDASH: 30  Clinical Progress: improved  Home Program Compliance: Yes  Treatment has included: therapeutic exercise, neuromuscular re-education, manual therapy and therapeutic activity      Subjective Evaluation    History of Present Illness  Mechanism of injury: The patient stated that his left shoulder is at least 50% better than it was at the time of his initial evaluation. He reported very minimal pain in the past week but continues to note tightness and weakness in the shoulder. He is pleased with his progress so far and hopes to build strength.    Pain  Current pain ratin  Location: L shoulder             Objective          Tenderness     Left Shoulder   No tenderness in the biceps tendon (proximal), coracoid process, infraspinatus tendon, subscapularis tendon and supraspinatus tendon.     Active Range of Motion   Left Shoulder   Flexion: 85 degrees   Abduction: 78 degrees   External rotation 0°: 17 degrees   Internal rotation BTB: L3     Right Shoulder   Flexion: 128 degrees   Abduction: 105 degrees   External rotation 0°: 30 degrees   Internal rotation BTB: T10     Additional Active Range of Motion Details  AROM in supine 118 deg flex, 90 deg abd    Passive Range of Motion   Left Shoulder   Flexion: 125 degrees   Abduction: 115 degrees     Strength/Myotome Testing     Right Shoulder     Planes of Motion   Flexion: 5   Abduction: 4+   External rotation at 0°: 4   Internal rotation at 0°: 4+     Left Wrist/Hand       (2nd hand position)     Trial 1: 30 lbs    Trial 2: 30 lbs    Trial 3: 30 lbs    Average: 30 lbs    Right Wrist/Hand      (2nd hand position)     Trial 1: 50 lbs    Trial 2: 50 lbs    Trial 3: 50 lbs    Average: 50 lbs          Assessment & Plan     Assessment  Impairments: abnormal muscle firing, abnormal or restricted ROM, impaired physical strength, lacks appropriate home exercise program and pain with function  Functional Limitations: carrying objects, lifting, reaching behind back, reaching overhead and unable to perform repetitive tasks  Assessment details: The patient continues to respond well to PT interventions and displayed maintained passive range of motion of the left shoulder despite a two week absence from PT. Active range of motion was improved compared to his last reassessment date and he is nearing 90° of available motion. Motion is primarily limited by weakness, as his passive range of motion and supine range of motion are significantly better than his standing motion. He has been compliant with his HEP and these exercises are getting easier, so a reading protocol was introduced today to work on open kinetic chain strength and end range active range of motion. I expect ongoing improvement as he begins to build strength around the shoulder.  Prognosis: fair    Goals  Plan Goals: Short Term Goals (4 weeks):     1. The patient will be independent and compliant with initial HEP. Met     2. The patient will report pain at rest 0/10 or less and worst pain 3/10 or less. Met     3. The patient will display decreased TTP in the L proximal humerus and dec mm tension in the surrounding musculature. Met    4.  L shoulder AROM will improve to flex 70 deg, abd 70 deg, ER 15 deg, IR to scarum. Met     5. The patient will demonstrate inc strength evidenced by MMT as follows: flex 4-/5, abd 4-/5, ER 4-/5, and IR 4-/5. Not assessed    6. Quick DASH will improve by 11 points or more. Ongoing         Long  Term Goals (8 weeks):     1. The patient will be appropriate for independent management and compliant with progressed HEP. Ongoing     2. The patient will report pain at rest 0/10 or less and worst pain 2/10 or less. Met    3. L shoulder AROM will be 80 deg flex and abd, 20 deg ER, IR to L3. Met     4. The patient will return to work duties and/or ADLs with no limitations due to shoulder pain or dysfunction. Ongoing     5. The patient will return to recreational and community activities with no limitations due to shoulder pain or dysfunction. Ongoing       Plan  Therapy options: will be seen for skilled therapy services  Planned modality interventions: cryotherapy, iontophoresis, TENS, electrical stimulation/Russian stimulation and thermotherapy (hydrocollator packs)  Planned therapy interventions: ADL retraining, body mechanics training, flexibility, functional ROM exercises, home exercise program, joint mobilization, manual therapy, neuromuscular re-education, postural training, soft tissue mobilization, strengthening, therapeutic activities and stretching  Frequency: 1x week  Duration in visits: 8  Duration in weeks: 8  Treatment plan discussed with: patient  Plan details: Continue stretching and AROM exercises. Strengthen with Reading protocol as tolerated.           Recommendations: Continue as planned  Timeframe: 6 weeks  Prognosis to achieve goals: fair      Timed:         Manual Therapy:    23     mins  98702;     Therapeutic Exercise:    7     mins  65642;   +12 no charge  Neuromuscular Lisbeth:    0    mins  21903;    Therapeutic Activity:     0     mins  71831;     Gait Trainin     mins  18427;     Ultrasound:     0     mins  52599;    Ionto                               0    mins   86580  Self Care                       0     mins   44664  Canalith Repos    0     mins 24808      Un-Timed:  Electrical Stimulation:    0     mins  42868 ( );  Dry Needling     0     mins self-pay  Traction     0      mins 71509  Re-Eval                           0    mins  30344      Timed Treatment:   30   mins   Total Treatment:     42   mins          PT: Jude Bradley PT     [place] License:  [id number]    Electronically signed by Jude Bradley PT, 09/07/22, 8:31 AM EDT    Certification Period: 9/7/2022 thru 12/5/2022  I certify that the therapy services are furnished while this patient is under my care.  The services outlined above are required by this patient, and will be reviewed every 90 days.         Physician Signature:__________________________________________________    PHYSICIAN: Tan Miranda MD  NPI: [id number]                                      DATE:  :     Please sign and return via fax to [email or web address] . Thank you, [facility]

## 2022-09-16 ENCOUNTER — TREATMENT (OUTPATIENT)
Dept: PHYSICAL THERAPY | Facility: CLINIC | Age: 87
End: 2022-09-16

## 2022-09-16 DIAGNOSIS — R53.1 WEAKNESS: Primary | ICD-10-CM

## 2022-09-16 DIAGNOSIS — M25.512 ACUTE PAIN OF LEFT SHOULDER: ICD-10-CM

## 2022-09-16 DIAGNOSIS — M25.60 RANGE OF MOTION DEFICIT: ICD-10-CM

## 2022-09-16 PROCEDURE — 97140 MANUAL THERAPY 1/> REGIONS: CPT | Performed by: PHYSICAL THERAPIST

## 2022-09-16 PROCEDURE — 97110 THERAPEUTIC EXERCISES: CPT | Performed by: PHYSICAL THERAPIST

## 2022-09-16 NOTE — PROGRESS NOTES
Physical Therapy Daily Treatment Note      Patient: Alin Lara   : 1935  Referring practitioner: Tan Miranda MD  Date of Initial Visit: Type: THERAPY  Noted: 2022  Today's Date: 2022  Patient seen for 11 sessions       Visit Diagnoses:    ICD-10-CM ICD-9-CM   1. Weakness  R53.1 780.79   2. Range of motion deficit  M25.60 719.50   3. Acute pain of left shoulder  M25.512 719.41       Subjective Evaluation    History of Present Illness  Mechanism of injury: The pt stated that his shoulder pain has been minimal and he feels his function is now back to his pre-injury status. He feels he is nearing the end of his necessary rehab. He is more concerned with his knee pain at the moment and hopes to get a PT order for this.     Pain  Current pain ratin  Location: L shoulder            Objective   See Exercise, Manual, and Modality Logs for complete treatment.       Assessment & Plan     Assessment    Assessment details: The pt reported minimal pain with normal activity in the last week but he continues to experience discomfort with end-range stretching into elevation. ROM was relatively unchanged this week compared to his last visit but he is pleased with his current functional state. He is interested in discontinuing rehab for his shoulder and starting a new course of therapy for bilateral knee pain. I would like to see radiographic evidence of fracture healing and an increase in strength before transitioning but I feel this is reasonable in the near future. He has rapid onset of muscular fatigue in the shoulder with exercise in the clinic and this should improve with continued strengthening.    Plan  Plan details: Continue strengthening exercises and stretching through end-ranges. Consider progressing HEP and increasing independence.           Timed:         Manual Therapy:    15     mins  65046;     Therapeutic Exercise:    25     mins  63094;     Neuromuscular Lisbeth:    0    mins  41136;     Therapeutic Activity:     0     mins  45920;     Gait Trainin     mins  74270;     Ultrasound:     0     mins  87957;    Ionto                               0    mins   84673  Self Care                       0     mins   67114  Canalith Repos    0     mins 35263      Un-Timed:  Electrical Stimulation:    0     mins  69486 ( );  Dry Needling     0     mins self-pay  Traction     0     mins 83039      Timed Treatment:   40   mins   Total Treatment:     40   mins    Jude Bradley, PT  [place] License: [id number]

## 2022-09-20 ENCOUNTER — TREATMENT (OUTPATIENT)
Dept: PHYSICAL THERAPY | Facility: CLINIC | Age: 87
End: 2022-09-20

## 2022-09-20 DIAGNOSIS — M25.60 RANGE OF MOTION DEFICIT: ICD-10-CM

## 2022-09-20 DIAGNOSIS — M25.512 ACUTE PAIN OF LEFT SHOULDER: ICD-10-CM

## 2022-09-20 DIAGNOSIS — R53.1 WEAKNESS: Primary | ICD-10-CM

## 2022-09-20 PROCEDURE — 97110 THERAPEUTIC EXERCISES: CPT | Performed by: PHYSICAL THERAPIST

## 2022-09-20 PROCEDURE — 97140 MANUAL THERAPY 1/> REGIONS: CPT | Performed by: PHYSICAL THERAPIST

## 2022-09-20 NOTE — PROGRESS NOTES
Physical Therapy Daily Treatment Note      Patient: Alin Lara   : 1935  Referring practitioner: Tan Miranda MD  Date of Initial Visit: Type: THERAPY  Noted: 2022  Today's Date: 2022  Patient seen for 12 sessions       Visit Diagnoses:  No diagnosis found.    Subjective Evaluation    History of Present Illness  Mechanism of injury: The pt stated that his shoulder pain has been mild in the last week and he is content with his current level of function. He has been compliant with his HEP and feels he can strengthen independently moving forward. He requested d/c today, but would like to be seen for knee pain in the near future.    Pain  Current pain ratin  Location: L shoulder           Objective          Tenderness     Left Shoulder   No tenderness in the biceps tendon (proximal), coracoid process, infraspinatus tendon, subscapularis tendon and supraspinatus tendon.     Active Range of Motion   Left Shoulder   Flexion: 87 degrees   Abduction: 78 degrees   External rotation 0°: 17 degrees   Internal rotation BTB: L3     Right Shoulder   Flexion: 128 degrees   Abduction: 105 degrees   External rotation 0°: 30 degrees   Internal rotation BTB: T10     Additional Active Range of Motion Details  AROM in supine 118 deg flex, 90 deg abd    Passive Range of Motion   Left Shoulder   Flexion: 125 degrees   Abduction: 115 degrees     Strength/Myotome Testing     Left Shoulder     Planes of Motion   Flexion: 4-   Abduction: 5   External rotation at 0°: 4-   Internal rotation at 0°: 4     Right Shoulder     Planes of Motion   Flexion: 5   Abduction: 4+   External rotation at 0°: 4   Internal rotation at 0°: 4+     Left Wrist/Hand      (2nd hand position)     Trial 1: 30 lbs    Trial 2: 30 lbs    Trial 3: 30 lbs    Average: 30 lbs    Right Wrist/Hand      (2nd hand position)     Trial 1: 50 lbs    Trial 2: 50 lbs    Trial 3: 50 lbs    Average: 50 lbs    Additional Strength Details  L  shoulder strength measured in limited ROM      See Exercise, Manual, and Modality Logs for complete treatment.       Assessment & Plan     Assessment    Assessment details: The pt continues to report little to no pain in the L shoulder with daily activity. He has ongoing weakness and ROM limitations but these are improving and he is content with his current level of function. He is currently functioning with more strength and motion that what was observed following his rTSA rehab, but I see no reason he could not continue to improve with ongoing stretching and consistent strengthening. He is eager to be d/c and hopes to begin PT for his knees in the near future. I would like to see that his fractures are healing before d/c, so I encouraged him to call after his next ortho appt to discuss POC.     Plan  Plan details: Pt to call following ortho appt to discuss d/c if fractures are found to be healing. This will serve as a d/c note if no additional visits are scheduled.          Timed:         Manual Therapy:    25     mins  84885;     Therapeutic Exercise:    13     mins  09324;     Neuromuscular Lisbeth:    0    mins  33039;    Therapeutic Activity:     0     mins  34313;     Gait Trainin     mins  35165;     Ultrasound:     0     mins  53451;    Ionto                               0    mins   13334  Self Care                       0     mins   61705  Canalith Repos    0     mins 85872      Un-Timed:  Electrical Stimulation:    0     mins  62003 ( );  Dry Needling     0     mins self-pay  Traction     0     mins 33209      Timed Treatment:   38   mins   Total Treatment:     38   mins    MYRANDA Mcconnell License: [id number]

## 2022-09-29 ENCOUNTER — OFFICE VISIT (OUTPATIENT)
Dept: ORTHOPEDIC SURGERY | Facility: CLINIC | Age: 87
End: 2022-09-29

## 2022-09-29 VITALS
DIASTOLIC BLOOD PRESSURE: 78 MMHG | BODY MASS INDEX: 35.65 KG/M2 | SYSTOLIC BLOOD PRESSURE: 124 MMHG | HEIGHT: 73 IN | WEIGHT: 268.96 LBS

## 2022-09-29 DIAGNOSIS — I89.0 LYMPHEDEMA OF LEFT LOWER EXTREMITY: ICD-10-CM

## 2022-09-29 DIAGNOSIS — Z09 FRACTURE FOLLOW-UP: ICD-10-CM

## 2022-09-29 DIAGNOSIS — Z96.612 HISTORY OF TOTAL REPLACEMENT OF LEFT SHOULDER JOINT: ICD-10-CM

## 2022-09-29 DIAGNOSIS — Z96.612 STATUS POST REVERSE TOTAL SHOULDER REPLACEMENT, LEFT: ICD-10-CM

## 2022-09-29 DIAGNOSIS — S42.202A CLOSED FRACTURE OF PROXIMAL END OF LEFT HUMERUS, UNSPECIFIED FRACTURE MORPHOLOGY, INITIAL ENCOUNTER: ICD-10-CM

## 2022-09-29 DIAGNOSIS — G89.29 CHRONIC PAIN OF RIGHT KNEE: ICD-10-CM

## 2022-09-29 DIAGNOSIS — M25.561 CHRONIC PAIN OF RIGHT KNEE: ICD-10-CM

## 2022-09-29 PROCEDURE — 99212 OFFICE O/P EST SF 10 MIN: CPT | Performed by: ORTHOPAEDIC SURGERY

## 2022-09-29 NOTE — PROGRESS NOTES
[facility] Follow Up       Office Follow Up Visit       Patient Name: Alin Lara    Chief Complaint:   Chief Complaint   Patient presents with   • Follow-up     7 weeks- Closed fracture of proximal end of left humerus       Referring Physician: No ref. provider found    History of Present Illness:   It has been 7  week(s) since Alin Lara's last visit. Alin Lara returns to clinic today for F/U: follow-up of leftBody Part: humerusReason: fracture. The issue has been ongoing for 15 week(s). Alin Lara rates HIS/HER: hispain at 2/10 on the pain scale. Previous/current treatments: cane/walker and physical therapy. Current symptoms:Symptoms: same as prior visit. The pain is worse with sleeping and lying on affected side; resting improves the pain. Overall, he/she: heis doing better.  I have reviewed the patient's history of present illness as noted/entered above.     I have reviewed the patient's past medical history, surgical history, social history, family history, medications, and allergies as noted in the electronic medical record and as noted/entered.  I have reviewed the patient's review of systems as noted/enter and updated as noted in the patient's HPI.        LEFT SHOULDER  7/1/2022:  Left shoulder he has had 2 separate periprosthetic fractures with 2 separate significant falls.  Counseled on interval healing and continue to protect his shoulder.     He said chronic lower extremity edema left more so than the right.  He has had multiple ultrasounds he has had chronic DVT changes.  He remains on anticoagulation for that.  We did have discussion about this today.  He is interested in physical therapy.  PT was ordered for the shoulder and perhaps they can assess the lower extremities as well given his gait, balance issues and recent falls.  He has seen vascular surgery/CT surgery and his primary care team among  others for the chronic lower extremity edema.  He notes he has been in discussion with his primary care team about this recently as well.  We will defer to his primary team and continue management over this over time as he is also had a prior total knee done on that same side at [facility].     [place] -progressing well he notes     He remains very active.     PRIOR:  10/19/2021:  Left shoulder has plateaued he feels somewhat worse she has been working on physical therapy with his lower extremity issues.   PT and notes and note with Dr. Bermudez reviewed.  He had his recent 2-year follow-up following his left total knee and he said lymphadenopathy and swelling of that lower extremity since the surgery on the knee.     Counseled on the left shoulder his radiographs appear stable compared to prior he is currently doing physical therapy for his lower extremity but not his shoulder.     Some shoulder soreness about 6 weeks ago.  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 Zamora for left venous insufficiency     Enjoyed  PT with Adrienne and did very well, his is interested in Outpatient PT at [facility]           8/12/2022:  LEFT SHOULDER  Mr. Lara suffered 2 separate falls which resulted in a more proximal periprosthetic fracture that has effectively healed in a more distal fracture which is still in the healing process.  The second fracture more distal was noted on  radiographs 6/8/2022 after a second fall  He is now 2+ months out from that he is doing physical therapy with Jude Bradley is very pleased and making significant gains.     Overall remains quite stoic improved/better    9/29/2022:  LEFT SHOULDER  Shoulder doing great.  Excellent PT with Jude.  He notes his shoulder is as good as it ever been after surgery and he is very pleased.  He does want PT with Jude for his knees and to get back into see Dr. Macedo.      Subjective   Subjective      Review of Systems     Past Medical History:   Past Medical History:   Diagnosis Date   • Basal cell carcinoma    • Blood loss     post op   • ED (erectile dysfunction)     Responsive to Viagra   • Failed total knee, right (HCC)    • Fasting hypoglycemia 2016    Hemoglobin A1c normal   • Generalized osteoarthritis    • GERD (gastroesophageal reflux disease)     History esophageal stricture   • History of blood clots    • HNP (herniated nucleus pulposus), lumbar 1988, 2014    L5 L6   • Hyperglycemia     not diabetic, pt states hes never has high blood sugar per pt    • Hyperlipidemia    • Hypertension    • Microscopic colitis 2017    Positive biopsy - colonoscopy   • OAB (overactive bladder) 2000    Persistent urgency   • Obesity Adulthood    2012 body weight 244 BMI 32   • Painful total knee replacement (HCC)    • Paroxysmal atrial fibrillation (HCC) 1991    one episode - cardioverted   • Prostatism 2014   • Pulmonary embolism (HCC) 1991    after injury and protracted immobilization   • Right knee DJD 2015    Partial knee arthroplasty   • Shoulder dislocation     Repeated episodes   • Sleep disturbances 1990    Frequently requires medication   • Transient cerebral ischemia 2010, 2016    Negative medical workup on both occasions   • Venous stasis    • Vertigo    • Wears glasses        Past Surgical History:   Past Surgical History:   Procedure Laterality Date   • CARDIOVERSION  1991    Atrial fibrillation   • COLONOSCOPY      2018    • KNEE ARTHROPLASTY Right 2015    Partial    • KNEE ARTHROPLASTY Left    • KNEE ARTHROSCOPY Right 2015    Failed procedure   • LUMBAR DISC SURGERY  2014    Surgery ×2 Ruptured disc   • LUMBAR DISCECTOMY      L5 L6    • SHOULDER SURGERY     • TOTAL SHOULDER ARTHROPLASTY W/ DISTAL CLAVICLE EXCISION Left 10/19/2020    Procedure: TOTAL SHOULDER REVERSE ARTHROPLASTY LEFT;  Surgeon: Tan Miranda MD;  Location: [facility];  Service: Orthopedics;  Laterality: Left;       Family History:   Family History   Problem Relation Age of Onset   • Dementia Mother          age 94   • Other Father          age 86 of unknown cause   • Rheum arthritis Father    • Coronary artery disease Brother    • No Known Problems Brother    • Sick sinus syndrome Other         Has pacemaker   • Diabetes Paternal Grandmother        Social History:   Social History     Socioeconomic History   • Marital status:    • Number of children: 2   Tobacco Use   • Smoking status: Never Smoker   • Smokeless tobacco: Former User     Types: Chew     Quit date:    Vaping Use   • Vaping Use: Never used   Substance and Sexual Activity   • Alcohol use: Yes     Alcohol/week: 1.0 standard drink     Types: 1 Cans of beer per week     Comment: Occasionally   • Drug use: No   • Sexual activity: Yes     Partners: Female     Comment: Monogamous       Medications:   Current Outpatient Medications:   •  apixaban (ELIQUIS) 5 MG tablet tablet, Take 5 mg by mouth., Disp: , Rfl:   •  ascorbic acid (VITAMIN C) 1000 MG tablet, Take 1 capsule by mouth Daily., Disp: , Rfl:   •  atenolol (TENORMIN) 50 MG tablet, TAKE 1 TABLET BY MOUTH DAILY., Disp: 90 tablet, Rfl: 1  •  CALCIUM-MAGNESIUM-ZINC PO, Take 1 tablet by mouth Daily., Disp: , Rfl:   •  Cholecalciferol (VITAMIN D-3) 25 MCG (1000 UT) capsule, Take  by mouth., Disp: , Rfl:   •  Coenzyme Q10 200 MG capsule, Take 200 mg by mouth Daily., Disp: 90 capsule, Rfl: 1  •  docusate sodium (Colace) 100 MG  "capsule, Take 1 capsule by mouth 2 (Two) Times a Day., Disp: 60 capsule, Rfl: 0  •  Multiple Vitamins-Minerals (CENTRUM ADULTS) tablet, Take 1-2 tablets by mouth daily., Disp: , Rfl:   •  oxybutynin XL (DITROPAN-XL) 5 MG 24 hr tablet, Take 1 tablet by mouth Every 12 (Twelve) Hours., Disp: 180 tablet, Rfl: 1  •  rivaroxaban (XARELTO) 20 MG tablet, Take 1 tablet by mouth Daily. Resume 10/22/2020, Disp: 30 tablet, Rfl:   •  Ropivacine HCl-NaCl (NAROPIN), 12 mg/hr by Peripheral Nerve route Continuous., Disp:  , Rfl:   •  sildenafil (VIAGRA) 100 MG tablet, Take 0.5-1 tablets by mouth as needed., Disp: , Rfl:   •  tamsulosin (FLOMAX) 0.4 MG capsule 24 hr capsule, TAKE 1 CAPSULE EVERY 12 HOURS (Patient taking differently: 2 capsules every night at bedtime.), Disp: 180 capsule, Rfl: 1  •  Zinc 50 MG capsule, Take  by mouth., Disp: , Rfl:     Allergies:   Allergies   Allergen Reactions   • Pravastatin Other (See Comments)     Nocturnal leg cramps   • Atorvastatin      Fatigue   • Qsymia [Phentermine-Topiramate] Mental Status Change   • Topiramate Myalgia       The following portions of the patient's history were reviewed and updated as appropriate: allergies, current medications, past family history, past medical history, past social history, past surgical history and problem list.        Objective    Objective      Vital Signs:   Vitals:    09/29/22 0808   BP: 124/78   Weight: 122 kg (268 lb 15.4 oz)   Height: 185.4 cm (72.99\")       Ortho Exam:  LEFT SHOULDER  Active and passive range of motion has improved he is back to baseline and improved/better than he has been after surgery he notes.  Good strength no fracture pain healed  Baseline knee issues -- sees Dr. Macedo    Results Review:  Imaging Results (Last 24 Hours)     Procedure Component Value Units Date/Time    XR Shoulder 2+ View Left [[id number]] Resulted: 09/29/22 0822     Updated: 09/29/22 0826    Narrative:      Imaging: shoulder x-rays 3 views - AP, axillary, " and scapular-Y x-ray   views    Side: LEFT SHOULDER    Indication for shoulder x-ray 3 views: shoulder pain    Comparison: preoperative and postoperative imaging    Findings: Implants well appearing and well positioned after shoulder   replacement.  Located and no acute fracture noted.    LEFT shoulder 3 views show healed periprosthetic fractures.  Multiple   fractures and these have healed.    I personally reviewed the above x-rays and discussed with the patient.            Procedures            Assessment / Plan      Assessment/Plan:   Problem List Items Addressed This Visit        Musculoskeletal and Injuries    Status post reverse total shoulder replacement, left    Fracture follow-up    Relevant Orders    XR Shoulder 2+ View Left (Completed)    Closed fracture of left proximal humerus    History of total replacement of left shoulder joint    Relevant Orders    Ambulatory Referral to Physical Therapy Evaluate and treat, Ortho    Chronic pain of right knee    Relevant Orders    Ambulatory Referral to Physical Therapy Evaluate and treat, Ortho       Symptoms and Signs    Lymphedema of left lower extremity    Relevant Orders    Ambulatory Referral to Physical Therapy Evaluate and treat, Ortho          LEFT SHOULDER  Excellent recovery following multiple falls, multiple periprosthetic fractures.  Fortunately he healed the fractures.  He is more concerned now about his knees and does desire to get back into see Dr. Macedo to discuss treatment options.  I will go ahead and order knee physical therapy to transition from shoulder to knee at this point.      Bilateral knee pain -- he will get into see Dr. Macedo again for his knees and he will continue to see Jude for his knees      Follow Up: 6 months      Tan Miranda MD, FAAOS  Orthopedic Surgeon  Fellowship Trained Shoulder and Elbow Surgeon  [facility]  Orthopedics and Sports Medicine  [address], [address]  [place]  [address]    09/29/22  08:27 EDT

## 2022-10-05 ENCOUNTER — TREATMENT (OUTPATIENT)
Dept: PHYSICAL THERAPY | Facility: CLINIC | Age: 87
End: 2022-10-05

## 2022-10-05 DIAGNOSIS — M25.561 CHRONIC PAIN OF BOTH KNEES: Primary | ICD-10-CM

## 2022-10-05 DIAGNOSIS — M25.562 CHRONIC PAIN OF BOTH KNEES: Primary | ICD-10-CM

## 2022-10-05 DIAGNOSIS — G89.29 CHRONIC PAIN OF BOTH KNEES: Primary | ICD-10-CM

## 2022-10-05 DIAGNOSIS — R53.1 WEAKNESS: ICD-10-CM

## 2022-10-05 PROCEDURE — 97162 PT EVAL MOD COMPLEX 30 MIN: CPT | Performed by: PHYSICAL THERAPIST

## 2022-10-05 PROCEDURE — 97110 THERAPEUTIC EXERCISES: CPT | Performed by: PHYSICAL THERAPIST

## 2022-10-05 NOTE — PROGRESS NOTES
"    Physical Therapy Initial Evaluation and Plan of Care    Patient: Alin Lara   : 1935  Diagnosis/ICD-10 Code:  Chronic pain of both knees [M25.561, M25.562, G89.29]  Referring practitioner: Tan Miranda MD  Date of Initial Visit: 10/5/2022  Today's Date: 10/5/2022  Patient seen for 1 session         Visit Diagnoses:    ICD-10-CM ICD-9-CM   1. Chronic pain of both knees  M25.561 719.46    M25.562 338.29    G89.29    2. Weakness  R53.1 780.79         Subjective Questionnaire: LEFS: 30      Subjective Evaluation    History of Present Illness  Mechanism of injury: The patient reported a six month history of worsening bilateral knee pain and weakness that is limiting his tolerance to standing and prolonged walking. He is walking about 1/4 mile per day and hopes to be able to walk 1 mile with PT. He has a history of a right partial knee replacement from four years ago and a left total knee replacement performed two years ago. He feels that he never fully recovered from the left knee surgery And noted ongoing swelling in the lower extremity and stiffness in the joint. He feels weak throughout the lower extremities, left worse than right, and would like to build functional strength and endurance. He had several months of rehab on his knees at  earlier this year but did not feel he saw improvement. He has venous insufficiency in the distal left lower extremity and has been seen by multiple specialists for this, but does not feel any interventions have helped. He has attempted compression sleeve use in the past but feels they are too tight. He currently manage his symptoms with rest and use of a cane but would like to return to an active lifestyle without need for assistive devices.    Pain  Location: B knee pain (\"mod to severe\"  Quality: throbbing, tight, pressure and dull ache  Relieving factors: rest  Aggravating factors: ambulation and standing  Progression: worsening    Social Support  Lives " in: one-story house  Lives with: spouse    Hand dominance: right    Treatments  Previous treatment: physical therapy  Patient Goals  Patient goals for therapy: decreased pain, improved balance, increased motion, increased strength and return to sport/leisure activities             Objective          Observations     Additional Knee Observation Details  Severe L LE swelling and mod R LE swelling  Redness and dryness of L LE distal to the knee    Palpation     Additional Palpation Details  Severe and superficial TTP in the quads, hamstrings, and calves bilaterally; worse at tendons    Active Range of Motion   Left Knee   Flexion: 95 degrees   Extension: 7 degrees     Right Knee   Flexion: 115 degrees     Strength/Myotome Testing     Left Hip   Planes of Motion   Flexion: 4-  Extension: 4-  Abduction: 3+  External rotation: 4-    Right Hip   Planes of Motion   Flexion: 4  Extension: 4-  Abduction: 4-  External rotation: 4-    Left Knee   Flexion: 4  Extension: 4+  Quadriceps contraction: fair    Right Knee   Flexion: 4  Extension: 4+  Quadriceps contraction: good          Assessment & Plan     Assessment  Impairments: abnormal gait, abnormal muscle firing, abnormal or restricted ROM, activity intolerance, impaired balance, impaired physical strength, lacks appropriate home exercise program and pain with function  Functional Limitations: lifting, walking, uncomfortable because of pain, standing, stooping and unable to perform repetitive tasks  Assessment details: The patient is a familiar 86 year-old male with evolving characteristics of bilateral knee pain, weakness, and mobility deficits with moderate complexity. He has bilateral swelling of the lower extremities, left worse than right, as well as venous insufficiency in the distal left lower extremity. His cardiologist recommended compression stockings but he has been unable to find ones that fit, so measurements were taken in the clinic today and he was provided a  link to more appropriate stockings on [employer]. He displayed hyper sensitivity to palpation throughout both lower extremities, worse in the hamstrings, and should see improvements with swelling management and stretching. The quads and glutes were each week and will need to be strengthened with a consistent exercise program. He is motivated towards this and should see improve strength in the next 4 to 6 weeks. I expect the patient to make significant improvements in mobility, strength, and pain with skilled PT intervention.    Prognosis: good    Goals  Plan Goals: Short Term Goals (4 weeks):     1. The patient will be independent and compliant with initial HEP.     2. The patient will report pain at rest 2/10 or less and worst pain 5/10 or less.    3. The patient will display decreased TTP in the B quads, hamstrings, and calves and dec mm tension in the surrounding musculature.    4.  L knee AROM will improve to ext/flex 5/105 deg.    5. The patient will demonstrate inc strength evidenced by MMT as follows: hip abd 4/5, hip ext 4/5, knee ext 5/5, and knee flex 5/5.    6. LEFS will improve by 9 points or more.     7. The pt will ambulate 200 feet with no AD and gait pattern free of apparent deviations.        Long Term Goals (8 weeks):     1. The patient will be appropriate for independent management and compliant with progressed HEP.     2. The patient will report pain at rest 0/10 or less and worst pain 3/10 or less.    3. The patient will display L knee AROM 5/110.    4. The patient will demonstrate good VMO activation evidenced by performance of a SLR with 3# and no extensor lag.    5. The patient will return to work duties and/or ADLs with no limitations due to knee pain or dysfunction.    6. The patient will return to recreational and community activities with no limitations due to knee pain or dysfunction.      Plan  Therapy options: will be seen for skilled therapy services  Planned modality interventions:  cryotherapy, electrical stimulation/Russian stimulation, iontophoresis, TENS and thermotherapy (hydrocollator packs)  Planned therapy interventions: ADL retraining, body mechanics training, balance/weight-bearing training, flexibility, functional ROM exercises, gait training, home exercise program, joint mobilization, manual therapy, neuromuscular re-education, postural training, soft tissue mobilization, strengthening, stretching, therapeutic activities and transfer training  Frequency: 1x week  Duration in visits: 8  Duration in weeks: 8  Treatment plan discussed with: patient  Plan details: The pt will likely benefit from TE/TA/NMED to improve strength of the hips, quads, and hamstrings, to improve balance, and to restore normal proprioception. MT will be utilized to improve ROM and jt mobility. Modalities will be used as needed for pain modulation.         History # of Personal Factors and/or Comorbidities: MODERATE (1-2)  Examination of Body System(s): # of elements: MODERATE (3)  Clinical Presentation: EVOLVING  Clinical Decision Making: MODERATE      Timed:         Manual Therapy:    0     mins  35342;     Therapeutic Exercise:    20     mins  95262;     Neuromuscular Lisbeth:    0    mins  71025;    Therapeutic Activity:     0     mins  90528;     Gait Trainin     mins  30708;     Ultrasound:     0     mins  42175;    Ionto                               0    mins   16059  Self Care                       0     mins   49277  Canalith Repos    0     mins 30107      Un-Timed:  Electrical Stimulation:    0     mins  32883 ( );  Dry Needling     0     mins self-pay  Traction     0     mins 81495  Low Eval     0     Mins  09223  Mod Eval     30     Mins  77316  High Eval                       0     Mins  81999        Timed Treatment:   20   mins   Total Treatment:     50   mins          PT: Jude Bradley PT     License Number: [id number]  Electronically signed by Jude Bradley PT, 10/05/22, 2:34 PM  EDT    Certification Period: 10/5/2022 thru 1/2/2023  I certify that the therapy services are furnished while this patient is under my care.  The services outlined above are required by this patient, and will be reviewed every 90 days.         Physician Signature:__________________________________________________    PHYSICIAN: Tan Miranda MD  NPI: [id number]                                      DATE:      Please sign and return via fax to [email or web address] . Thank you, [facility].

## 2022-10-14 ENCOUNTER — TREATMENT (OUTPATIENT)
Dept: PHYSICAL THERAPY | Facility: CLINIC | Age: 87
End: 2022-10-14

## 2022-10-14 ENCOUNTER — OFFICE VISIT (OUTPATIENT)
Dept: ORTHOPEDIC SURGERY | Facility: CLINIC | Age: 87
End: 2022-10-14

## 2022-10-14 VITALS
WEIGHT: 274.6 LBS | BODY MASS INDEX: 36.39 KG/M2 | DIASTOLIC BLOOD PRESSURE: 75 MMHG | SYSTOLIC BLOOD PRESSURE: 137 MMHG | HEIGHT: 73 IN

## 2022-10-14 DIAGNOSIS — M25.60 RANGE OF MOTION DEFICIT: ICD-10-CM

## 2022-10-14 DIAGNOSIS — M25.562 CHRONIC PAIN OF BOTH KNEES: Primary | ICD-10-CM

## 2022-10-14 DIAGNOSIS — Z96.612 HISTORY OF TOTAL REPLACEMENT OF LEFT SHOULDER JOINT: ICD-10-CM

## 2022-10-14 DIAGNOSIS — G89.29 CHRONIC PAIN OF BOTH KNEES: Primary | ICD-10-CM

## 2022-10-14 DIAGNOSIS — R53.1 WEAKNESS: ICD-10-CM

## 2022-10-14 DIAGNOSIS — M25.561 CHRONIC PAIN OF BOTH KNEES: Primary | ICD-10-CM

## 2022-10-14 DIAGNOSIS — I89.0 LYMPHEDEMA OF LEFT LOWER EXTREMITY: Primary | ICD-10-CM

## 2022-10-14 PROCEDURE — 97110 THERAPEUTIC EXERCISES: CPT | Performed by: PHYSICAL THERAPIST

## 2022-10-14 PROCEDURE — 99213 OFFICE O/P EST LOW 20 MIN: CPT | Performed by: ORTHOPAEDIC SURGERY

## 2022-10-14 RX ORDER — PANTOPRAZOLE SODIUM 40 MG/1
TABLET, DELAYED RELEASE ORAL
COMMUNITY
Start: 2022-09-16

## 2022-10-14 NOTE — PROGRESS NOTES
Orthopaedic Clinic Note: Knee Established Patient    Chief Complaint   Patient presents with   • Follow-up     Chronic pain of left knee         HPI    It has been 21  month(s) since Mr. Lara's last visit. He returns to clinic today for follow-up left knee pain.  He is status post total knee arthroplasty performed by Dr. Bermudez that was complicated by blood clot postoperatively.  He now has chronic lymphedema that is causing difficulty with ambulation and pain in his leg.  Rates as a 3/10 on the pain scale.  He is here today to discuss his ongoing swelling in the leg and discussed treatment options.  Denies fevers chills or constitutional symptoms.  He remains on Eliquis.    Past Medical History:   Diagnosis Date   • Basal cell carcinoma    • Blood loss     post op   • ED (erectile dysfunction)     Responsive to Viagra   • Failed total knee, right (HCC)    • Fasting hypoglycemia 2016    Hemoglobin A1c normal   • Generalized osteoarthritis    • GERD (gastroesophageal reflux disease)     History esophageal stricture   • History of blood clots    • HNP (herniated nucleus pulposus), lumbar 1988, 2014    L5 L6   • Hyperglycemia     not diabetic, pt states hes never has high blood sugar per pt    • Hyperlipidemia    • Hypertension    • Microscopic colitis 2017    Positive biopsy - colonoscopy   • OAB (overactive bladder) 2000    Persistent urgency   • Obesity Adulthood    2012 body weight 244 BMI 32   • Painful total knee replacement (HCC)    • Paroxysmal atrial fibrillation (HCC) 1991    one episode - cardioverted   • Prostatism 2014   • Pulmonary embolism (HCC) 1991    after injury and protracted immobilization   • Right knee DJD 2015    Partial knee arthroplasty   • Shoulder dislocation     Repeated episodes   • Sleep disturbances 1990    Frequently requires medication   • Transient cerebral ischemia 2010, 2016    Negative medical workup on both occasions   • Venous stasis    • Vertigo    • Wears glasses       Past  Surgical History:   Procedure Laterality Date   • CARDIOVERSION      Atrial fibrillation   • COLONOSCOPY      2018   • KNEE ARTHROPLASTY Right 2015    Partial    • KNEE ARTHROPLASTY Left    • KNEE ARTHROSCOPY Right 2015    Failed procedure   • LUMBAR DISC SURGERY  2014    Surgery ×2 Ruptured disc   • LUMBAR DISCECTOMY      L5 L6    • SHOULDER SURGERY     • TOTAL SHOULDER ARTHROPLASTY W/ DISTAL CLAVICLE EXCISION Left 10/19/2020    Procedure: TOTAL SHOULDER REVERSE ARTHROPLASTY LEFT;  Surgeon: Tan Miranda MD;  Location: [facility];  Service: Orthopedics;  Laterality: Left;      Family History   Problem Relation Age of Onset   • Dementia Mother          age 94   • Other Father          age 86 of unknown cause   • Rheum arthritis Father    • Coronary artery disease Brother    • No Known Problems Brother    • Sick sinus syndrome Other         Has pacemaker   • Diabetes Paternal Grandmother      Social History     Socioeconomic History   • Marital status:    • Number of children: 2   Tobacco Use   • Smoking status: Never   • Smokeless tobacco: Former     Types: Chew     Quit date:    Vaping Use   • Vaping Use: Never used   Substance and Sexual Activity   • Alcohol use: Yes     Alcohol/week: 1.0 standard drink     Types: 1 Cans of beer per week     Comment: Occasionally   • Drug use: No   • Sexual activity: Yes     Partners: Female     Comment: Monogamous      Current Outpatient Medications on File Prior to Visit   Medication Sig Dispense Refill   • apixaban (ELIQUIS) 5 MG tablet tablet Take 5 mg by mouth.     • ascorbic acid (VITAMIN C) 1000 MG tablet Take 1 capsule by mouth Daily.     • atenolol (TENORMIN) 50 MG tablet TAKE 1 TABLET BY MOUTH DAILY. 90 tablet 1   • CALCIUM-MAGNESIUM-ZINC PO Take 1 tablet by mouth Daily.     • Cholecalciferol (VITAMIN D-3) 25 MCG (1000 UT) capsule Take  by mouth.     • Coenzyme Q10 200 MG capsule Take 200 mg by mouth Daily. 90 capsule 1   • docusate  "sodium (Colace) 100 MG capsule Take 1 capsule by mouth 2 (Two) Times a Day. 60 capsule 0   • Multiple Vitamins-Minerals (CENTRUM ADULTS) tablet Take 1-2 tablets by mouth daily.     • oxybutynin XL (DITROPAN-XL) 5 MG 24 hr tablet Take 1 tablet by mouth Every 12 (Twelve) Hours. 180 tablet 1   • pantoprazole (PROTONIX) 40 MG EC tablet TAKE 1 TABLET BY MOUTH EVERY DAY **DO NOT CRUSH, CHEW, OR SPLIT**     • rivaroxaban (XARELTO) 20 MG tablet Take 1 tablet by mouth Daily. Resume 10/22/2020 30 tablet    • Ropivacine HCl-NaCl (NAROPIN) 12 mg/hr by Peripheral Nerve route Continuous.     • sildenafil (VIAGRA) 100 MG tablet Take 0.5-1 tablets by mouth as needed.     • tamsulosin (FLOMAX) 0.4 MG capsule 24 hr capsule TAKE 1 CAPSULE EVERY 12 HOURS (Patient taking differently: 2 capsules every night at bedtime.) 180 capsule 1   • Zinc 50 MG capsule Take  by mouth.       No current facility-administered medications on file prior to visit.      Allergies   Allergen Reactions   • Pravastatin Other (See Comments)     Nocturnal leg cramps   • Atorvastatin      Fatigue   • Qsymia [Phentermine-Topiramate] Mental Status Change   • Topiramate Myalgia        Review of Systems   Constitutional: Negative.    HENT: Negative.    Eyes: Negative.    Respiratory: Negative.    Cardiovascular: Negative.    Gastrointestinal: Negative.    Endocrine: Negative.    Genitourinary: Negative.    Musculoskeletal: Positive for arthralgias.   Skin: Negative.    Allergic/Immunologic: Negative.    Neurological: Negative.    Hematological: Negative.    Psychiatric/Behavioral: Negative.         The patient's Review of Systems was personally reviewed and confirmed as accurate.    Physical Exam  Blood pressure 137/75, height 185.4 cm (72.99\"), weight 125 kg (274 lb 9.6 oz).    Body mass index is 36.24 kg/m².    GENERAL APPEARANCE: awake, alert, oriented, in no acute distress and well developed, well nourished  LUNGS:  breathing nonlabored  EXTREMITIES: no clubbing, " cyanosis  PERIPHERAL PULSES: palpable dorsalis pedis and posterior tibial pulses bilaterally.    GAIT:  Antalgic        ----------  Left Knee Exam:  ----------  ALIGNMENT: neutral  ----------  RANGE OF MOTION:  Decreased (5 - 120 degrees) with no extensor lag  LIGAMENTOUS STABILITY:   stable to varus and valgus stress at terminal extension and 30 degrees without any evidence of laxity  ----------  STRENGTH:  KNEE FLEXION 5/5  KNEE EXTENSION  5/5  ANKLE DORSIFLEXION  5/5  ANKLE PLANTARFLEXION  5/5  ----------  PAIN WITH PALPATION:  Trace tenderness about the knee.    KNEE EFFUSION:   Minimal effusion  PAIN WITH KNEE ROM: yes  PATELLAR CREPITUS:  no  ----------  SENSATION TO LIGHT TOUCH:  DEEP PERONEAL/SUPERFICIAL PERONEAL/SURAL/SAPHENOUS/TIBIAL:    intact  ----------  EDEMA:   Lymphedema from ankle extending to knee proximally  ERYTHEMA:    no  WOUNDS/INCISIONS:   yes, well healed surgical incision without evidence of erythema or drainage  _____________________________________________________________________  _____________________________________________________________________    RADIOGRAPHIC FINDINGS:   Radiographs of the left knee from 5/16/2022 were personally interpreted.  Radiographs demonstrate no acute bony injury or fracture.  Total knee arthroplasty components are well-positioned with no evidence of component loosening, subsidence, failure.    Assessment/Plan:   Diagnosis Plan   1. Lymphedema of left lower extremity        2. History of total replacement of left shoulder joint          Patient's ongoing lower extremity pain is secondary to lymphedema.  I discussed with him that there is no permanent fix for chronic lymphedema.  I discussed treatment options for him including compression socks.  Otherwise, over-the-counter Tylenol as needed.  I reassured him I see no clinical radiographic evidence of complication from his knee replacement.  He will follow-up with me as needed.    Patient has evidence of  lymphedema involving the lower extremity/extremities.  I recommended compression stockings to improve this condition.  A list of local retail stores where the compression stockings can be purchased were provided.  Patient was instructed to purchase compression stockings and wear whenever the lower extremities are in a dependent position.      Zaheer Macedo MD  10/14/22  08:33 EDT

## 2022-10-14 NOTE — PROGRESS NOTES
Physical Therapy Daily Treatment Note  [facility] [place] [place] [facility] [place]  [address], [address]  [place] [address]      Patient: Alin Lara   : 1935  Referring practitioner: No ref. provider found  Date of Initial Visit: Type: THERAPY  Noted: 10/5/2022  Today's Date: 10/14/2022  Patient seen for 2 sessions       Visit Diagnoses:    ICD-10-CM ICD-9-CM   1. Chronic pain of both knees  M25.561 719.46    M25.562 338.29    G89.29    2. Weakness  R53.1 780.79   3. Range of motion deficit  M25.60 719.50       Subjective Evaluation    History of Present Illness  Mechanism of injury: The pt stated that his swelling has improved with performance of HEP exercises, particularly with heel raises. He has not been stretching much. He has not ordered compression stockings and stated he is not interested in them at this time. He continues to report superficial TTP around each knee.    Pain  Current pain ratin  Location: B knee pain           Objective   See Exercise, Manual, and Modality Logs for complete treatment.       Assessment & Plan     Assessment    Assessment details: Swelling in B LEs was improved today following his first week of HEP performance, and he was advised to continue calf pumps as able. Compression stockings are probably his best option for reducing swelling but he is not interested in these at this time. Several quad and glute strengthening exercises were prescribed in the clinic today and were tolerated relatively well, though he favored his R LE and was easily fatigued with squats and SLS. Following SLS he stated he felt unstable and requested to discontinue the visit. He will need to continue building strength and mm endurance with high rep exercises as tolerated.    Plan  Plan details: Continue high rep strengthening of the quads and glutes. Stretch the hamstrings.          Timed:         Manual Therapy:    0     mins  25545;     Therapeutic Exercise:    38     mins  79215;     Neuromuscular  Lisbeth:    0    mins  60909;    Therapeutic Activity:     0     mins  13078;     Gait Trainin     mins  93109;     Ultrasound:     0     mins  29541;    Ionto                               0    mins   44800  Self Care                       0     mins   43438  Canalith Repos    0     mins 14342      Un-Timed:  Electrical Stimulation:    0     mins  11822 ( );  Dry Needling     0     mins self-pay  Traction     0     mins 38189      Timed Treatment:   38   mins   Total Treatment:     47   mins    Jude Bradley, PT  [place] License: [id number]

## 2022-10-19 ENCOUNTER — TREATMENT (OUTPATIENT)
Dept: PHYSICAL THERAPY | Facility: CLINIC | Age: 87
End: 2022-10-19

## 2022-10-19 DIAGNOSIS — R53.1 WEAKNESS: ICD-10-CM

## 2022-10-19 DIAGNOSIS — M25.562 CHRONIC PAIN OF BOTH KNEES: Primary | ICD-10-CM

## 2022-10-19 DIAGNOSIS — G89.29 CHRONIC PAIN OF BOTH KNEES: Primary | ICD-10-CM

## 2022-10-19 DIAGNOSIS — M25.561 CHRONIC PAIN OF BOTH KNEES: Primary | ICD-10-CM

## 2022-10-19 DIAGNOSIS — M25.60 RANGE OF MOTION DEFICIT: ICD-10-CM

## 2022-10-19 PROCEDURE — 97110 THERAPEUTIC EXERCISES: CPT | Performed by: PHYSICAL THERAPIST

## 2022-10-19 NOTE — PROGRESS NOTES
Physical Therapy Daily Treatment Note  [facility] [place] [facility] [place]  [address], [address]  [place] [address]      Patient: Alin Lara   : 1935  Referring practitioner: Tan Miranda MD  Date of Initial Visit: Type: THERAPY  Noted: 10/5/2022  Today's Date: 10/19/2022  Patient seen for 3 sessions       Visit Diagnoses:    ICD-10-CM ICD-9-CM   1. Chronic pain of both knees  M25.561 719.46    M25.562 338.29    G89.29    2. Weakness  R53.1 780.79   3. Range of motion deficit  M25.60 719.50       Subjective Evaluation    History of Present Illness  Mechanism of injury: The pt stated that he was sore after his last visit but expected this. He has been compliant with his HEP and reported he performs STS frequently. He continues to report limited tolerance to standing and walking.    Pain  Current pain rating: 3  Location: B knee pain           Objective   See Exercise, Manual, and Modality Logs for complete treatment.       Assessment & Plan     Assessment    Assessment details: The pt was sore following his last visit but is understanding that soreness is expected throughout the strengthening process. He was challenged with high rep, low weight quad strengthening exercises to improve LE stamina and was advised to treat his HEP in the same way. He was easily fatigued with repeated squatting and step up exercises but ultimately tolerated treatment well. He was encouraged to increase his walking duration as able to build tolerance to activity.     Plan  Plan details: Continue quad and glute strengthening as tolerated.          Timed:         Manual Therapy:    0     mins  00856;     Therapeutic Exercise:    40     mins  18856;     Neuromuscular Lisbeth:    0    mins  32566;    Therapeutic Activity:     0     mins  59349;     Gait Trainin     mins  84579;     Ultrasound:     0     mins  97031;    Ionto                               0    mins   58592  Self Care                       0     mins    81360  Canalith Repos    0     mins 77869      Un-Timed:  Electrical Stimulation:    0     mins  63921 ( );  Dry Needling     0     mins self-pay  Traction     0     mins 93367      Timed Treatment:   40   mins   Total Treatment:     46   mins    Jude Bradley, PT  [place] License: [id number]

## 2022-10-26 ENCOUNTER — TREATMENT (OUTPATIENT)
Dept: PHYSICAL THERAPY | Facility: CLINIC | Age: 87
End: 2022-10-26

## 2022-10-26 DIAGNOSIS — M25.562 CHRONIC PAIN OF BOTH KNEES: Primary | ICD-10-CM

## 2022-10-26 DIAGNOSIS — M25.60 RANGE OF MOTION DEFICIT: ICD-10-CM

## 2022-10-26 DIAGNOSIS — M25.561 CHRONIC PAIN OF BOTH KNEES: Primary | ICD-10-CM

## 2022-10-26 DIAGNOSIS — R53.1 WEAKNESS: ICD-10-CM

## 2022-10-26 DIAGNOSIS — G89.29 CHRONIC PAIN OF BOTH KNEES: Primary | ICD-10-CM

## 2022-10-26 PROCEDURE — 97110 THERAPEUTIC EXERCISES: CPT | Performed by: PHYSICAL THERAPIST

## 2022-10-26 PROCEDURE — 97530 THERAPEUTIC ACTIVITIES: CPT | Performed by: PHYSICAL THERAPIST

## 2022-10-26 PROCEDURE — 97112 NEUROMUSCULAR REEDUCATION: CPT | Performed by: PHYSICAL THERAPIST

## 2022-10-26 NOTE — PROGRESS NOTES
Physical Therapy Daily Treatment Note  [facility] [place] [place]  [address], [address]  [place] [address]      Patient: Alin Lara   : 1935  Referring practitioner: Tan Miranda MD  Date of Initial Visit: Type: THERAPY  Noted: 10/5/2022  Today's Date: 10/26/2022  Patient seen for 4 sessions       Visit Diagnoses:    ICD-10-CM ICD-9-CM   1. Chronic pain of both knees  M25.561 719.46    M25.562 338.29    G89.29    2. Weakness  R53.1 780.79   3. Range of motion deficit  M25.60 719.50       Subjective Evaluation    History of Present Illness  Mechanism of injury: The pt stated that his knees continue to limit his tolerance to walking and reported he can walk 5-10 minutes at a time before needing to sit. He no longer feels that he needs his cane but likes to keep it with him to prevent falls. He has been compliant with his HEP.    Pain  Current pain rating: 3  Location: B knee pain           Objective   See Exercise, Manual, and Modality Logs for complete treatment.       Assessment & Plan     Assessment    Assessment details: The pt continues to exhibit limited tolerance to activity, due in part to knee pain and in part to general fatigue. He tolerated exercise well in short bouts today and only complained of knee pain on one occasion when performing mini lunges. He tolerated a greater number of exercises today than in previous visits but required seated rest breaks to limit fatigue. He is using his cane less but feels he needs it to prevent falls, so several weight shifting and imbalance correction activities were introduced to improve his stability. He initially showed limited weight shift with marching and had limited balance as a result, but was able to improve this with education and cuing.     Plan  Plan details: Add balance corrections to HEP. Continue strengthening of the LEs and balance exercises as tolerated.          Timed:         Manual Therapy:    0     mins  78188;      Therapeutic Exercise:    23     mins  05207;     Neuromuscular Lisbeth:    15    mins  67088;    Therapeutic Activity:     15     mins  27260;     Gait Trainin     mins  77870;     Ultrasound:     0     mins  93162;    Ionto                               0    mins   20918  Self Care                       0     mins   37144  Canalith Repos    0     mins 29524      Un-Timed:  Electrical Stimulation:    0     mins  45935 ( );  Dry Needling     0     mins self-pay  Traction     0     mins 43379      Timed Treatment:   53   mins   Total Treatment:     53   mins    MYRANDA Mcconnell License: [id number]

## 2022-11-01 ENCOUNTER — TREATMENT (OUTPATIENT)
Dept: PHYSICAL THERAPY | Facility: CLINIC | Age: 87
End: 2022-11-01

## 2022-11-01 DIAGNOSIS — R53.1 WEAKNESS: ICD-10-CM

## 2022-11-01 DIAGNOSIS — M25.60 RANGE OF MOTION DEFICIT: ICD-10-CM

## 2022-11-01 DIAGNOSIS — M25.561 CHRONIC PAIN OF BOTH KNEES: Primary | ICD-10-CM

## 2022-11-01 DIAGNOSIS — G89.29 CHRONIC PAIN OF BOTH KNEES: Primary | ICD-10-CM

## 2022-11-01 DIAGNOSIS — M25.562 CHRONIC PAIN OF BOTH KNEES: Primary | ICD-10-CM

## 2022-11-01 PROCEDURE — 97110 THERAPEUTIC EXERCISES: CPT | Performed by: PHYSICAL THERAPIST

## 2022-11-01 NOTE — PROGRESS NOTES
"Physical Therapy Daily Treatment Note  [facility] [place] [facility] [place]  [address], [address]  [place] [address]      Patient: Alin Lara   : 1935  Referring practitioner: Tan Miranda MD  Date of Initial Visit: Type: THERAPY  Noted: 10/5/2022  Today's Date: 2022  Patient seen for 5 sessions       Visit Diagnoses:    ICD-10-CM ICD-9-CM   1. Chronic pain of both knees  M25.561 719.46    M25.562 338.29    G89.29    2. Weakness  R53.1 780.79   3. Range of motion deficit  M25.60 719.50       Subjective Evaluation    History of Present Illness  Mechanism of injury: The patient stated that his legs were very sore after his last PT visit. He has continued to perform his HEP exercises and feels they are going well. He was offered increased visit frequency but stated he prefers to keep his visits at one time per week.    Pain  Location: \"some knee pain\" - B knees           Objective   See Exercise, Manual, and Modality Logs for complete treatment.       Assessment & Plan     Assessment    Assessment details: The patient continues to display limited tolerance to activity and standing due to lower extremity muscle fatigue and general pain of bilateral knees. He reported that discomfort in the hamstrings limited him today but he responded well to isometric hamstring contractions and stretching in the clinic. Knee pain was reproduced with squats but was reduced with position correction to limit anterior knee translation. He was encouraged to limit his rest breaks while exercising to challenge his muscular endurance but was unable to limit these compared to last visit due to pain and fatigue.    Plan  Plan details: Continue strengthening of the lower extremities and progress glute strengthening exercises.          Timed:         Manual Therapy:    0     mins  84094;     Therapeutic Exercise:    38     mins  00983;     Neuromuscular Lisbeth:    0    mins  64285;    Therapeutic Activity:     0     mins  " 06308;     Gait Trainin     mins  15442;     Ultrasound:     0     mins  19042;    Ionto                               0    mins   60218  Self Care                       0     mins   50938  Canalith Repos    0     mins 35174      Un-Timed:  Electrical Stimulation:    0     mins  07388 ( );  Dry Needling     0     mins self-pay  Traction     0     mins 25615      Timed Treatment:   38   mins   Total Treatment:     47   mins    Jude Bradley PT  [place] License: [id number]

## 2022-11-16 ENCOUNTER — TREATMENT (OUTPATIENT)
Dept: PHYSICAL THERAPY | Facility: CLINIC | Age: 87
End: 2022-11-16

## 2022-11-16 DIAGNOSIS — M25.562 CHRONIC PAIN OF BOTH KNEES: Primary | ICD-10-CM

## 2022-11-16 DIAGNOSIS — M25.561 CHRONIC PAIN OF BOTH KNEES: Primary | ICD-10-CM

## 2022-11-16 DIAGNOSIS — G89.29 CHRONIC PAIN OF BOTH KNEES: Primary | ICD-10-CM

## 2022-11-16 DIAGNOSIS — R53.1 WEAKNESS: ICD-10-CM

## 2022-11-16 DIAGNOSIS — M25.60 RANGE OF MOTION DEFICIT: ICD-10-CM

## 2022-11-16 PROCEDURE — 97110 THERAPEUTIC EXERCISES: CPT | Performed by: PHYSICAL THERAPIST

## 2022-11-16 NOTE — PROGRESS NOTES
Physical Therapy Re Certification Of Plan of Care  [facility] [place]  [address]  [place] [address]    Patient: Alin Lara   : 1935  Diagnosis/ICD-10 Code:  Chronic pain of both knees [M25.561, M25.562, G89.29]  Referring practitioner: Tan Miranda MD  Date of Initial Visit: Type: THERAPY  Noted: 10/5/2022  Today's Date: 2022  Patient seen for 6 sessions         Visit Diagnoses:    ICD-10-CM ICD-9-CM   1. Chronic pain of both knees  M25.561 719.46    M25.562 338.29    G89.29    2. Weakness  R53.1 780.79   3. Range of motion deficit  M25.60 719.50         Subjective Questionnaire: QuickDASH: 33  Clinical Progress: improved  Home Program Compliance: Yes  Treatment has included: therapeutic exercise, neuromuscular re-education and therapeutic activity      Subjective Evaluation    History of Present Illness  Mechanism of injury: The pt stated that his legs have been feeling stronger since beginning PT. He feels more steady when walking and no longer feels he needs a cane. He has been sick for the last 2 weeks and has not been able to exercise much. He feels that his HEP exercises are getting easy.     Pain  Current pain ratin  Location: R knee; 2/10 L knee             Objective          Observations     Additional Knee Observation Details  Severe L LE swelling and mod R LE swelling  Redness and dryness of L LE distal to the knee    Palpation     Additional Palpation Details  Severe and superficial TTP in the quads, hamstrings, and calves bilaterally; worse at tendons    Active Range of Motion   Left Knee   Flexion: 95 degrees   Extension: 7 degrees     Right Knee   Flexion: 115 degrees     Strength/Myotome Testing     Left Hip   Planes of Motion   Flexion: 4+  Extension: 4-  Abduction: 4  External rotation: 4    Right Hip   Planes of Motion   Flexion: 4+  Extension: 4-  Abduction: 4  External rotation: 4    Left Knee   Flexion: 4+  Extension: 4+  Quadriceps  contraction: fair    Right Knee   Flexion: 4+  Extension: 4+  Quadriceps contraction: good          Assessment & Plan     Assessment  Impairments: abnormal gait, abnormal muscle firing, abnormal or restricted ROM, activity intolerance, impaired balance, impaired physical strength, lacks appropriate home exercise program and pain with function  Functional Limitations: lifting, walking, uncomfortable because of pain, standing, stooping and unable to perform repetitive tasks  Assessment details: The patient has seen fair improvement in LE strength with initial PT interventions for LE weakness and gait instability. He has been unable to exercise much in the last 2 weeks due to sickness but did not seem to have regressed in force production or stability compared to his last visit. He has been able to largely wean from his cane and is using it very infrequently at this time. Lateral hip strength is improving nicely and has continuted to improved stability in standing and increased tolerance to FWB activities. He continues to lack power in the glutes and quads, so sagittal plane power will be the emphasis of treatment for the next stretch of rehab. His STS were modified to include a powerful rise and slow lower to work on both power and control. He has had trouble getting into and out of his theraband loop, so hip abduction was modified to an isometric position and hip extension was modified to a donkey kick. He remains motivated towards rehab and should continue to see improvement in strength and function.   Prognosis: good    Goals  Plan Goals: Short Term Goals (4 weeks):     1. The patient will be independent and compliant with initial HEP. Met     2. The patient will report pain at rest 2/10 or less and worst pain 5/10 or less. Partially met    3. The patient will display decreased TTP in the B quads, hamstrings, and calves and dec mm tension in the surrounding musculature. Ongoing     4.  L knee AROM will improve to  ext/flex 5/105 deg. Ongoing     5. The patient will demonstrate inc strength evidenced by MMT as follows: hip abd 4/5, hip ext 4/5, knee ext 5/5, and knee flex 5/5. Improved - ongoing    6. LEFS will improve by 9 points or more. Ongoing     7. The pt will ambulate 200 feet with no AD and gait pattern free of apparent deviations. Ongoing         Long Term Goals (8 weeks):     1. The patient will be appropriate for independent management and compliant with progressed HEP. Ongoing    2. The patient will report pain at rest 0/10 or less and worst pain 3/10 or less. Ongoing    3. The patient will display L knee AROM 5/110. Ongoing    4. The patient will demonstrate good VMO activation evidenced by performance of a SLR with 3# and no extensor lag. Ongoing    5. The patient will return to work duties and/or ADLs with no limitations due to knee pain or dysfunction. Ongoing    6. The patient will return to recreational and community activities with no limitations due to knee pain or dysfunction. Ongoing      Plan  Therapy options: will be seen for skilled therapy services  Planned modality interventions: cryotherapy, electrical stimulation/Russian stimulation, iontophoresis, TENS and thermotherapy (hydrocollator packs)  Planned therapy interventions: ADL retraining, body mechanics training, balance/weight-bearing training, flexibility, functional ROM exercises, gait training, home exercise program, joint mobilization, manual therapy, neuromuscular re-education, postural training, soft tissue mobilization, strengthening, stretching, therapeutic activities and transfer training  Frequency: 1x week  Duration in visits: 8  Duration in weeks: 8  Treatment plan discussed with: patient  Plan details: Continue working on power for the quads and glutes as tolerated.            Recommendations: Continue as planned  Timeframe: 2 months  Prognosis to achieve goals: fair      Timed:         Manual Therapy:    0     mins  76973;      Therapeutic Exercise:    40     mins  83154;     Neuromuscular Lisbeth:    0    mins  36276;    Therapeutic Activity:     0     mins  39501;     Gait Trainin     mins  68410;     Ultrasound:     0     mins  57441;    Ionto                               0    mins   11090  Self Care                       0     mins   37917  Canalith Repos    0     mins 90816      Un-Timed:  Electrical Stimulation:    0     mins  09343 ( );  Dry Needling     0     mins self-pay  Traction     0     mins 08728  Re-Eval                           0    mins  01830      Timed Treatment:   40   mins   Total Treatment:     40   mins          PT: Jude Bradley PT     [place] License:  [id number]    Electronically signed by Jude Bradley PT, 22, 8:30 AM EST    Certification Period: 2022 thru 2023  I certify that the therapy services are furnished while this patient is under my care.  The services outlined above are required by this patient, and will be reviewed every 90 days.         Physician Signature:__________________________________________________    PHYSICIAN: Tan Miranda MD  NPI: [id number]                                      DATE:  :     Please sign and return via fax to [email or web address] . Thank you, [facility]

## 2022-11-30 ENCOUNTER — TREATMENT (OUTPATIENT)
Dept: PHYSICAL THERAPY | Facility: CLINIC | Age: 87
End: 2022-11-30

## 2022-11-30 DIAGNOSIS — G89.29 CHRONIC PAIN OF BOTH KNEES: Primary | ICD-10-CM

## 2022-11-30 DIAGNOSIS — M25.561 CHRONIC PAIN OF BOTH KNEES: Primary | ICD-10-CM

## 2022-11-30 DIAGNOSIS — M25.60 RANGE OF MOTION DEFICIT: ICD-10-CM

## 2022-11-30 DIAGNOSIS — M25.562 CHRONIC PAIN OF BOTH KNEES: Primary | ICD-10-CM

## 2022-11-30 DIAGNOSIS — R53.1 WEAKNESS: ICD-10-CM

## 2022-11-30 PROCEDURE — 97110 THERAPEUTIC EXERCISES: CPT | Performed by: PHYSICAL THERAPIST

## 2022-11-30 NOTE — PROGRESS NOTES
Physical Therapy Daily Treatment Note  [facility] [place]  [address], [address]  [place] [address]      Patient: Alin Lara   : 1935  Referring practitioner: Tan Miranda MD  Date of Initial Visit: Type: THERAPY  Noted: 10/5/2022  Today's Date: 2022  Patient seen for 7 sessions       Visit Diagnoses:    ICD-10-CM ICD-9-CM   1. Chronic pain of both knees  M25.561 719.46    M25.562 338.29    G89.29    2. Weakness  R53.1 780.79   3. Range of motion deficit  M25.60 719.50       Subjective Evaluation    History of Present Illness  Mechanism of injury: The pt stated that his knees have been irritated in the last week with no apparent cause. He has not been able to exercise since his last visit. He reported he was not feeling well today.    Pain  Current pain ratin  Location: B knees           Objective   See Exercise, Manual, and Modality Logs for complete treatment.       Assessment & Plan     Assessment    Assessment details: The pt has not been feeling well for several weeks and had additional life events that limited his ability to exercise since his last visit. His knees were not feeling well today and he appeared more lethargic than usual. Exercises from his last visit were repeated today to restart consistent intervention, and he was educated on the importance of building regional strength and stability to improve balance, power, and general tolerance to activity. He was encouraged to resume HEP performance this week.     Plan  Plan details: Continue progressions of glute, quad, and hamstring strengthening with emphasis on power and control.          Timed:         Manual Therapy:    0     mins  89712;     Therapeutic Exercise:    38     mins  46869;     Neuromuscular Lisbeth:    0    mins  83616;    Therapeutic Activity:     0     mins  41844;     Gait Trainin     mins  89023;     Ultrasound:     0     mins  50952;    Ionto                               0    mins    87031  Self Care                       0     mins   10557  Canalith Repos    0     mins 95792      Un-Timed:  Electrical Stimulation:    0     mins  35089 ( );  Dry Needling     0     mins self-pay  Traction     0     mins 13409      Timed Treatment:   38   mins   Total Treatment:     38   mins    Jude Bradley, PT  [place] License: [id number]

## 2022-12-14 ENCOUNTER — TREATMENT (OUTPATIENT)
Dept: PHYSICAL THERAPY | Facility: CLINIC | Age: 87
End: 2022-12-14

## 2022-12-14 DIAGNOSIS — M25.562 CHRONIC PAIN OF BOTH KNEES: Primary | ICD-10-CM

## 2022-12-14 DIAGNOSIS — M25.561 CHRONIC PAIN OF BOTH KNEES: Primary | ICD-10-CM

## 2022-12-14 DIAGNOSIS — G89.29 CHRONIC PAIN OF BOTH KNEES: Primary | ICD-10-CM

## 2022-12-14 DIAGNOSIS — R53.1 WEAKNESS: ICD-10-CM

## 2022-12-14 DIAGNOSIS — M25.60 RANGE OF MOTION DEFICIT: ICD-10-CM

## 2022-12-14 PROCEDURE — 97110 THERAPEUTIC EXERCISES: CPT | Performed by: PHYSICAL THERAPIST

## 2022-12-14 NOTE — PROGRESS NOTES
Physical Therapy Daily Treatment Note  [facility] [place]  [address], [address]  [place] [address]      Patient: Alin Lara   : 1935  Referring practitioner: Tan Miranda MD  Date of Initial Visit: Type: THERAPY  Noted: 10/5/2022  Today's Date: 2022  Patient seen for 8 sessions       Visit Diagnoses:    ICD-10-CM ICD-9-CM   1. Chronic pain of both knees  M25.561 719.46    M25.562 338.29    G89.29    2. Weakness  R53.1 780.79   3. Range of motion deficit  M25.60 719.50       Subjective Evaluation    History of Present Illness  Mechanism of injury: The pt stated that he has not felt well for several weeks and has not been exercising as a result. He feels he needs to return to exercise to improve his health and function.     Pain  Current pain ratin  Location: B knee pain           Objective   See Exercise, Manual, and Modality Logs for complete treatment.       Assessment & Plan     Assessment    Assessment details: The pt appeared lethargic again today compared to his baseline but tolerated exercise well today. Emphasis was placed on quad power and glute stability, and exercise was challenging but not painful. He was easily fatigued with lateral stepups and reported difficulty with SLS. He has not felt well now for several weeks in a row and has not been exercising regularly as a result. He will benefit from return to routine exercise.    Plan  Plan details: Continue high rep strengthening of the quads and glutes.          Timed:         Manual Therapy:    0     mins  01126;     Therapeutic Exercise:    40     mins  60650;     Neuromuscular Lisbeth:    0    mins  16898;    Therapeutic Activity:     0     mins  82181;     Gait Trainin     mins  43989;     Ultrasound:     0     mins  83858;    Ionto                               0    mins   77145  Self Care                       0     mins   06519  Canalith Repos    0     mins 01336      Un-Timed:  Electrical  Stimulation:    0     mins  22829 ( );  Dry Needling     0     mins self-pay  Traction     0     mins 64341      Timed Treatment:   40   mins   Total Treatment:     40   mins    Jude Bradley, PT  [place] License: [id number]

## 2022-12-21 ENCOUNTER — TREATMENT (OUTPATIENT)
Dept: PHYSICAL THERAPY | Facility: CLINIC | Age: 87
End: 2022-12-21

## 2022-12-21 DIAGNOSIS — G89.29 CHRONIC PAIN OF BOTH KNEES: Primary | ICD-10-CM

## 2022-12-21 DIAGNOSIS — M25.60 RANGE OF MOTION DEFICIT: ICD-10-CM

## 2022-12-21 DIAGNOSIS — M25.561 CHRONIC PAIN OF BOTH KNEES: Primary | ICD-10-CM

## 2022-12-21 DIAGNOSIS — R53.1 WEAKNESS: ICD-10-CM

## 2022-12-21 DIAGNOSIS — M25.562 CHRONIC PAIN OF BOTH KNEES: Primary | ICD-10-CM

## 2022-12-21 PROCEDURE — 97110 THERAPEUTIC EXERCISES: CPT | Performed by: PHYSICAL THERAPIST

## 2022-12-21 NOTE — PROGRESS NOTES
Physical Therapy Re Certification Of Plan of Care  [facility] [place] [place]  [address] [address]    Patient: Alin Lara   : 1935  Diagnosis/ICD-10 Code:  Chronic pain of both knees [M25.561, M25.562, G89.29]  Referring practitioner: Tan Miranda MD  Date of Initial Visit: Type: THERAPY  Noted: 10/5/2022  Today's Date: 2022  Patient seen for 9 sessions         Visit Diagnoses:    ICD-10-CM ICD-9-CM   1. Chronic pain of both knees  M25.561 719.46    M25.562 338.29    G89.29    2. Weakness  R53.1 780.79   3. Range of motion deficit  M25.60 719.50         Subjective Questionnaire: not assessed today  Clinical Progress: improved  Home Program Compliance: partial  Treatment has included: therapeutic exercise, neuromuscular re-education, manual therapy, therapeutic activity and gait training      Subjective Evaluation    History of Present Illness  Mechanism of injury: The pt stated that he has walked every day this week and feels it has improved his LE swelling. However, knee pain has been increased. He has been consistent with squatting but has not performed other HEP exercises. He reported feeling better this week than he has the last few.     Pain  Current pain ratin  Location: B knee pain             Objective          Observations     Additional Knee Observation Details  Mod L LE swelling and mod R LE swelling  Minimal redness and dryness of L LE distal to the knee    Palpation     Additional Palpation Details  Severe and superficial TTP in the quads, hamstrings, and calves bilaterally; worse at tendons    Active Range of Motion   Left Knee   Flexion: 97 degrees   Extension: 5 degrees     Right Knee   Flexion: 120 degrees   Extension: 2 degrees     Strength/Myotome Testing     Left Hip   Planes of Motion   Flexion: 5  Extension: 4-  Abduction: 4  External rotation: 4+    Right Hip   Planes of Motion   Flexion: 4+  Extension: 4-  Abduction: 4  External  rotation: 4+    Left Knee   Flexion: 4+  Extension: 4+  Quadriceps contraction: fair    Right Knee   Flexion: 4+  Extension: 4+  Quadriceps contraction: good     General Comments     Knee Comments  Calf girth: 46 cm (L), 42 cm (R)         Assessment & Plan     Assessment  Impairments: abnormal gait, abnormal muscle firing, abnormal or restricted ROM, activity intolerance, impaired balance, impaired physical strength, lacks appropriate home exercise program and pain with function  Functional Limitations: lifting, walking, uncomfortable because of pain, standing, stooping and unable to perform repetitive tasks  Assessment details: The patient did not feel well for several weeks in a row and was unable to perform much exercise. Fortunately, he did not show any objective strength regressions since his last visit. He has been able to walk every day for the last week and saw a large improvement in LE swelling, redness, and discomfort. He will benefit from return to a consistent strengthening program for improved power and stability and ongoing walking for improved circulation.   Prognosis: good    Goals  Plan Goals: Short Term Goals (4 weeks):     1. The patient will be independent and compliant with initial HEP. Met     2. The patient will report pain at rest 2/10 or less and worst pain 5/10 or less. Partially met    3. The patient will display decreased TTP in the B quads, hamstrings, and calves and dec mm tension in the surrounding musculature. Ongoing     4.  L knee AROM will improve to ext/flex 5/105 deg. Ongoing     5. The patient will demonstrate inc strength evidenced by MMT as follows: hip abd 4/5, hip ext 4/5, knee ext 5/5, and knee flex 5/5. Improved - ongoing    6. LEFS will improve by 9 points or more. Ongoing     7. The pt will ambulate 200 feet with no AD and gait pattern free of apparent deviations. Ongoing         Long Term Goals (8 weeks):     1. The patient will be appropriate for independent management  and compliant with progressed HEP. Ongoing    2. The patient will report pain at rest 0/10 or less and worst pain 3/10 or less. Ongoing    3. The patient will display L knee AROM 5/110. Ongoing    4. The patient will demonstrate good VMO activation evidenced by performance of a SLR with 3# and no extensor lag. Ongoing    5. The patient will return to work duties and/or ADLs with no limitations due to knee pain or dysfunction. Ongoing    6. The patient will return to recreational and community activities with no limitations due to knee pain or dysfunction. Ongoing      Plan  Therapy options: will be seen for skilled therapy services  Planned modality interventions: cryotherapy, electrical stimulation/Russian stimulation, iontophoresis, TENS and thermotherapy (hydrocollator packs)  Planned therapy interventions: ADL retraining, body mechanics training, balance/weight-bearing training, flexibility, functional ROM exercises, gait training, home exercise program, joint mobilization, manual therapy, neuromuscular re-education, postural training, soft tissue mobilization, strengthening, stretching, therapeutic activities and transfer training  Frequency: 1x week  Duration in visits: 8  Duration in weeks: 8  Treatment plan discussed with: patient  Plan details: Continue working on power for the quads and glutes as tolerated.            Recommendations: Continue as planned  Timeframe: 2 months  Prognosis to achieve goals: fair      Timed:         Manual Therapy:    0     mins  36855;     Therapeutic Exercise:    40     mins  40171;     Neuromuscular Lisbeth:    0    mins  58838;    Therapeutic Activity:     0     mins  50452;     Gait Trainin     mins  74587;     Ultrasound:     0     mins  15258;    Ionto                               0    mins   51163  Self Care                       0     mins   75955  Canalith Repos    0     mins 96968      Un-Timed:  Electrical Stimulation:    0     mins  74679 ( );  Dry  Needling     0     mins self-pay  Traction     0     mins 25149  Re-Eval                           0    mins  86246      Timed Treatment:   40   mins   Total Treatment:     40   mins          PT: Jude Bradley PT     [place] License:  [id number]    Electronically signed by Jude Bradley PT, 12/21/22, 8:22 AM EST    Certification Period: 12/21/2022 thru 3/20/2023  I certify that the therapy services are furnished while this patient is under my care.  The services outlined above are required by this patient, and will be reviewed every 90 days.         Physician Signature:__________________________________________________    PHYSICIAN: Tan Miranda MD  NPI: [id number]                                      DATE:  :     Please sign and return via fax to [email or web address] . Thank you, [facility]

## 2022-12-28 ENCOUNTER — TREATMENT (OUTPATIENT)
Dept: PHYSICAL THERAPY | Facility: CLINIC | Age: 87
End: 2022-12-28

## 2022-12-28 DIAGNOSIS — M25.60 RANGE OF MOTION DEFICIT: ICD-10-CM

## 2022-12-28 DIAGNOSIS — G89.29 CHRONIC PAIN OF BOTH KNEES: Primary | ICD-10-CM

## 2022-12-28 DIAGNOSIS — R53.1 WEAKNESS: ICD-10-CM

## 2022-12-28 DIAGNOSIS — M25.561 CHRONIC PAIN OF BOTH KNEES: Primary | ICD-10-CM

## 2022-12-28 DIAGNOSIS — M79.89 SWELLING OF LEFT LOWER EXTREMITY: ICD-10-CM

## 2022-12-28 DIAGNOSIS — M25.562 CHRONIC PAIN OF BOTH KNEES: Primary | ICD-10-CM

## 2022-12-28 PROCEDURE — 97110 THERAPEUTIC EXERCISES: CPT | Performed by: PHYSICAL THERAPIST

## 2022-12-28 NOTE — PROGRESS NOTES
Defer to Dr. Hall on all antibiotic choices---Her note has been reviewed and is appreciated.     Amphotericin until 02/03   zosyn and gent for pseudomonal double coverage until 02/01 2/4 treatment completed   Physical Therapy Daily Treatment Note  [facility] [place] [facility] [place]  [address], [address]  [place] [address]      Patient: Alin Lara   : 1935  Referring practitioner: Tan Miranda MD  Date of Initial Visit: Type: THERAPY  Noted: 10/5/2022  Today's Date: 2022  Patient seen for 10 sessions       Visit Diagnoses:    ICD-10-CM ICD-9-CM   1. Chronic pain of both knees  M25.561 719.46    M25.562 338.29    G89.29    2. Weakness  R53.1 780.79   3. Range of motion deficit  M25.60 719.50   4. Swelling of left lower extremity  M79.89 729.81       Subjective Evaluation    History of Present Illness  Mechanism of injury: The pt has continued to walk and perform sit to stands consistently and feels that his LE swelling is improved. He is interested in managing independently with his HEP for a time. He feels that he is getting stronger but would like to continue building strength. He has not been performing stretches or hip abd/ext exercises.    Pain  Current pain ratin  Location: B knees           Objective          Ambulation     Comments   TU.1 sec, 11.8 sec  30 sec STS with hands: 7 reps       See Exercise, Manual, and Modality Logs for complete treatment.       Assessment & Plan     Assessment    Assessment details: The pt has seen recent improvement in L LE swelling, redness, and trophic changes as a result of beginning a walking program. He remains generally weak in the LEs but this is improving slowly. He is understanding of the need for consistent strengthening to improve his function and prefers to manage independently at this time. He was encouraged to return if symptoms worsened, failed to improve, or if exercises become too easy. He was prescribed additional strengthening activities per external documentation to round out a comprehensive program.    Plan  Plan details: Chart to be left open for 30 days. D/c if no additional visits are scheduled.           Timed:         Manual  Therapy:    0     mins  00370;     Therapeutic Exercise:    40     mins  34760;     Neuromuscular Lisbeth:    0    mins  50608;    Therapeutic Activity:     0     mins  87106;     Gait Trainin     mins  36728;     Ultrasound:     0     mins  63750;    Ionto                               0    mins   91826  Self Care                       0     mins   49911  Canalith Repos    0     mins 92982      Un-Timed:  Electrical Stimulation:    0     mins  48959 ( );  Dry Needling     0     mins self-pay  Traction     0     mins 23813      Timed Treatment:   40   mins   Total Treatment:     40   mins    Jude Bradley, PT  [place] License: [id number]

## 2023-03-30 ENCOUNTER — OFFICE VISIT (OUTPATIENT)
Dept: ORTHOPEDIC SURGERY | Facility: CLINIC | Age: 88
End: 2023-03-30
Payer: MEDICARE

## 2023-03-30 VITALS
HEIGHT: 73 IN | SYSTOLIC BLOOD PRESSURE: 124 MMHG | DIASTOLIC BLOOD PRESSURE: 76 MMHG | BODY MASS INDEX: 36.79 KG/M2 | WEIGHT: 277.6 LBS

## 2023-03-30 DIAGNOSIS — Z96.612 STATUS POST REVERSE TOTAL SHOULDER REPLACEMENT, LEFT: ICD-10-CM

## 2023-03-30 DIAGNOSIS — Z96.612 HISTORY OF TOTAL REPLACEMENT OF LEFT SHOULDER JOINT: ICD-10-CM

## 2023-03-30 DIAGNOSIS — S42.202A CLOSED FRACTURE OF PROXIMAL END OF LEFT HUMERUS, UNSPECIFIED FRACTURE MORPHOLOGY, INITIAL ENCOUNTER: Primary | ICD-10-CM

## 2023-03-30 RX ORDER — ATORVASTATIN CALCIUM 40 MG/1
1 TABLET, FILM COATED ORAL DAILY
COMMUNITY
Start: 2023-02-01

## 2023-03-30 RX ORDER — LIDOCAINE AND PRILOCAINE 25; 25 MG/G; MG/G
CREAM TOPICAL
COMMUNITY
Start: 2023-02-07 | End: 2024-02-07

## 2023-03-30 RX ORDER — APIXABAN 5 MG/1
1 TABLET, FILM COATED ORAL EVERY 12 HOURS SCHEDULED
COMMUNITY
Start: 2023-03-13

## 2023-03-30 RX ORDER — LIDOCAINE 50 MG/G
1 PATCH TOPICAL DAILY
Qty: 30 PATCH | Refills: 11 | COMMUNITY
Start: 2023-02-07 | End: 2024-02-02

## 2023-03-30 NOTE — PROGRESS NOTES
[facility] Office Follow Up       Office Follow Up Visit       Patient Name: Alin Lara    Chief Complaint:   Chief Complaint   Patient presents with   • Follow-up     6 month f/u Closed fracture of proximal end of left humerus, DOI 05.06.2022 and 2.5 years s/p Total Shoulder Reverse Arthroplasty Left 10/19/20       Referring Physician: No ref. provider found    History of Present Illness:   It has been 6  month(s) since Alin Lara's last visit. Alin Lara returns to clinic today for F/U: follow-up of leftBody Part: shoulderReason: fracture. The issue has been ongoing for 10 month(s). Alin Lara rates HIS/HER: hispain at 1/10 on the pain scale. Previous/current treatments: cane/walker. Current symptoms:Symptoms: same as prior visit. Overall, he/she: heis doing better.  I have reviewed the patient's history of present illness as noted/entered above.    I have reviewed the patient's past medical history, surgical history, social history, family history, medications, and allergies as noted in the electronic medical record and as noted/entered.  I have reviewed the patient's review of systems as noted/enter and updated as noted in the patient's HPI.    LEFT SHOULDER  7/1/2022:  Left shoulder he has had 2 separate periprosthetic fractures with 2 separate significant falls.  Counseled on interval healing and continue to protect his shoulder.     He said chronic lower extremity edema left more so than the right.  He has had multiple ultrasounds he has had chronic DVT changes.  He remains on anticoagulation for that.  We did have discussion about this today.  He is interested in physical therapy.  PT was ordered for the shoulder and perhaps they can assess the lower extremities as well given his gait, balance issues and recent falls.  He has seen vascular surgery/CT surgery and his primary care team among others for the chronic  lower extremity edema.  He notes he has been in discussion with his primary care team about this recently as well.  We will defer to his primary team and continue management over this over time as he is also had a prior total knee done on that same side at [facility].     [place] -progressing well he notes     He remains very active.     PRIOR:  10/19/2021:  Left shoulder has plateaued he feels somewhat worse she has been working on physical therapy with his lower extremity issues.   PT and notes and note with Dr. Bermudez reviewed.  He had his recent 2-year follow-up following his left total knee and he said lymphadenopathy and swelling of that lower extremity since the surgery on the knee.     Counseled on the left shoulder his radiographs appear stable compared to prior he is currently doing physical therapy for his lower extremity but not his shoulder.     Some shoulder soreness about 6 weeks ago.  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 Zamora for left venous insufficiency     Enjoyed  PT with Adrienne and did very well, his is interested in Outpatient PT at [facility]           8/12/2022:  LEFT SHOULDER  Mr. Lara suffered 2 separate falls which resulted in a more proximal periprosthetic fracture that has effectively healed in a more distal fracture which is still in the healing process.  The second fracture more distal was noted on radiographs 6/8/2022 after a  second fall  He is now 2+ months out from that he is doing physical therapy with Jude Bradley is very pleased and making significant gains.     Overall remains quite stoic improved/better     9/29/2022:  LEFT SHOULDER  Shoulder doing great.  Excellent PT with Jude.  He notes his shoulder is as good as it ever been after surgery and he is very pleased.  He does want PT with Jude for his knees and to get back into see Dr. Macedo      3/30/2023:  Left shoulder status post reverse shoulder arthroplasty 10/19/2020  2 falls and 2 subsequent fractures that have healed. Last fracture after fall 6/8/2022 -- 9 months prior    Home PT/exercises with Jacob Pimentel      Subjective   Subjective      Review of Systems   Constitutional: Negative.  Negative for chills, fatigue and fever.   HENT: Negative.  Negative for congestion and dental problem.    Eyes: Negative.  Negative for blurred vision.   Respiratory: Negative.  Negative for shortness of breath.    Cardiovascular: Negative.  Negative for leg swelling.   Gastrointestinal: Negative.  Negative for abdominal pain.   Endocrine: Negative.  Negative for polyuria.   Genitourinary: Negative.  Negative for difficulty urinating.   Musculoskeletal: Positive for arthralgias.   Skin: Negative.    Allergic/Immunologic: Negative.    Neurological: Negative.    Hematological: Negative.  Negative for adenopathy.   Psychiatric/Behavioral: Negative.  Negative for behavioral problems.        Past Medical History:   Past Medical History:   Diagnosis Date   • Basal cell carcinoma    • Blood loss     post op   • ED (erectile dysfunction)     Responsive to Viagra   • Failed total knee, right (HCC)    • Fasting hypoglycemia 2016    Hemoglobin A1c normal   • Generalized osteoarthritis    • GERD (gastroesophageal reflux disease)     History esophageal stricture   • History of blood clots    • HNP (herniated nucleus pulposus), lumbar 1988, 2014    L5 L6   • Hyperglycemia     not diabetic, pt states  hes never has high blood sugar per pt    • Hyperlipidemia    • Hypertension    • Microscopic colitis 2017    Positive biopsy - colonoscopy   • OAB (overactive bladder)     Persistent urgency   • Obesity Adulthood     body weight 244 BMI 32   • Painful total knee replacement (HCC)    • Paroxysmal atrial fibrillation (HCC)     one episode - cardioverted   • Prostatism    • Pulmonary embolism (HCC)     after injury and protracted immobilization   • Right knee DJD     Partial knee arthroplasty   • Shoulder dislocation     Repeated episodes   • Sleep disturbances     Frequently requires medication   • Transient cerebral ischemia ,     Negative medical workup on both occasions   • Venous stasis    • Vertigo    • Wears glasses        Past Surgical History:   Past Surgical History:   Procedure Laterality Date   • CARDIOVERSION      Atrial fibrillation   • COLONOSCOPY      2018   • KNEE ARTHROPLASTY Right 2015    Partial    • KNEE ARTHROPLASTY Left    • KNEE ARTHROSCOPY Right 2015    Failed procedure   • LUMBAR DISC SURGERY  2014    Surgery ×2 Ruptured disc   • LUMBAR DISCECTOMY      L5 L6    • SHOULDER SURGERY     • TOTAL SHOULDER ARTHROPLASTY W/ DISTAL CLAVICLE EXCISION Left 10/19/2020    Procedure: TOTAL SHOULDER REVERSE ARTHROPLASTY LEFT;  Surgeon: Tan Miranda MD;  Location: [facility];  Service: Orthopedics;  Laterality: Left;       Family History:   Family History   Problem Relation Age of Onset   • Dementia Mother          age 94   • Other Father          age 86 of unknown cause   • Rheum arthritis Father    • Coronary artery disease Brother    • No Known Problems Brother    • Sick sinus syndrome Other         Has pacemaker   • Diabetes Paternal Grandmother        Social History:   Social History     Socioeconomic History   • Marital status:    • Number of children: 2   Tobacco Use   • Smoking status: Never   • Smokeless tobacco: Former     Types:  Chew     Quit date: 1960   Vaping Use   • Vaping Use: Never used   Substance and Sexual Activity   • Alcohol use: Yes     Alcohol/week: 1.0 standard drink     Types: 1 Cans of beer per week     Comment: Occasionally   • Drug use: No   • Sexual activity: Yes     Partners: Female     Comment: Monogamous       Medications:   Current Outpatient Medications:   •  ascorbic acid (VITAMIN C) 1000 MG tablet, Take 1 capsule by mouth Daily., Disp: , Rfl:   •  atenolol (TENORMIN) 50 MG tablet, TAKE 1 TABLET BY MOUTH DAILY., Disp: 90 tablet, Rfl: 1  •  CALCIUM-MAGNESIUM-ZINC PO, Take 1 tablet by mouth Daily., Disp: , Rfl:   •  Cholecalciferol (VITAMIN D-3) 25 MCG (1000 UT) capsule, Take  by mouth., Disp: , Rfl:   •  Coenzyme Q10 200 MG capsule, Take 200 mg by mouth Daily., Disp: 90 capsule, Rfl: 1  •  docusate sodium (Colace) 100 MG capsule, Take 1 capsule by mouth 2 (Two) Times a Day., Disp: 60 capsule, Rfl: 0  •  Eliquis 5 MG tablet tablet, Take 1 tablet by mouth Every 12 (Twelve) Hours., Disp: , Rfl:   •  lidocaine (LIDODERM) 5 %, Apply 1 patch topically to the appropriate area as directed Daily., Disp: 30 patch, Rfl: 11  •  lidocaine-prilocaine (EMLA) 2.5-2.5 % cream, Apply to affected area BID, Disp: , Rfl:   •  Multiple Vitamins-Minerals (CENTRUM ADULTS) tablet, Take 1-2 tablets by mouth Daily., Disp: , Rfl:   •  oxybutynin XL (DITROPAN-XL) 5 MG 24 hr tablet, Take 1 tablet by mouth Every 12 (Twelve) Hours., Disp: 180 tablet, Rfl: 1  •  pantoprazole (PROTONIX) 40 MG EC tablet, TAKE 1 TABLET BY MOUTH EVERY DAY **DO NOT CRUSH, CHEW, OR SPLIT**, Disp: , Rfl:   •  Zinc 50 MG capsule, Take  by mouth., Disp: , Rfl:   •  atorvastatin (LIPITOR) 40 MG tablet, Take 1 tablet by mouth Daily. (Patient not taking: Reported on 3/30/2023), Disp: , Rfl:   •  sildenafil (VIAGRA) 100 MG tablet, Take 0.5-1 tablets by mouth as needed. (Patient not taking: Reported on 3/30/2023), Disp: , Rfl:   •  tamsulosin (FLOMAX) 0.4 MG capsule 24 hr  "capsule, TAKE 1 CAPSULE EVERY 12 HOURS (Patient not taking: Reported on 3/30/2023), Disp: 180 capsule, Rfl: 1    Allergies:   Allergies   Allergen Reactions   • Pravastatin Other (See Comments)     Nocturnal leg cramps   • Atorvastatin      Fatigue   • Qsymia [Phentermine-Topiramate] Mental Status Change   • Topiramate Myalgia       The following portions of the patient's history were reviewed and updated as appropriate: allergies, current medications, past family history, past medical history, past social history, past surgical history and problem list.        Objective    Objective      Vital Signs:   Vitals:    03/30/23 0805   BP: 124/76   Weight: 126 kg (277 lb 9.6 oz)   Height: 185.4 cm (72.99\")       Ortho Exam:  LEFT SHOULDER  Smooth arc of motion  Pain free  110/110/30      Results Review:  Imaging Results (Last 24 Hours)     Procedure Component Value Units Date/Time    XR Shoulder 2+ View Left [[id number]] Resulted: 03/30/23 0836     Updated: 03/30/23 0837    Narrative:      Imaging: shoulder x-rays 3 views - AP, axillary, and scapular-Y x-ray   views    Side: Left shoulder    Indication for shoulder x-ray 3 views: shoulder pain    Comparison: Postoperative imaging    Findings: No acute bony pathology. Implants well appearing and well   positioned after shoulder replacement.  Located and no fracture   noted-prior known periprosthetic fractures have healed with robust callus   formation.    Left reverse shoulder arthroplasty shows stable findings compared to prior   stable on the humeral and the glenoid side and prior periprosthetic   fractures have healed with robust callus.    I personally reviewed the above x-rays.            Procedures            Assessment / Plan      Assessment/Plan:   Problem List Items Addressed This Visit        Musculoskeletal and Injuries    Status post reverse total shoulder replacement, left    Relevant Orders    Ambulatory Referral to Physical Therapy Evaluate and treat, Ortho " (Completed)    Closed fracture of left proximal humerus - Primary    Relevant Orders    Ambulatory Referral to Physical Therapy Evaluate and treat, Ortho (Completed)    History of total replacement of left shoulder joint    Relevant Orders    XR Shoulder 2+ View Left (Completed)    Ambulatory Referral to Physical Therapy Evaluate and treat, Ortho (Completed)       Left shoulder stable findings compared to prior.  Periprosthetic fractures have healed.  He will continue with physical therapy working on scapular stabilization.  Did  that he can continue to optimize motion but that it likely has plateaued or will plateau.  Scapular stabilization can help.    PT with Jacob    Follow Up: 12 months with left shoulder 3 views      Tan Miranda MD, FAAOS  Orthopedic Surgeon  Fellowship Trained Shoulder and Elbow Surgeon  [facility]  Orthopedics and Sports Medicine  [address] [place] [address], [address]  [place] [address]    03/30/23  08:42 EDT

## 2024-03-26 ENCOUNTER — OFFICE VISIT (OUTPATIENT)
Dept: ORTHOPEDIC SURGERY | Facility: CLINIC | Age: 89
End: 2024-03-26
Payer: MEDICARE

## 2024-03-26 VITALS
HEIGHT: 72 IN | BODY MASS INDEX: 38.39 KG/M2 | SYSTOLIC BLOOD PRESSURE: 150 MMHG | WEIGHT: 283.4 LBS | DIASTOLIC BLOOD PRESSURE: 82 MMHG

## 2024-03-26 DIAGNOSIS — Z96.612 HISTORY OF TOTAL REPLACEMENT OF LEFT SHOULDER JOINT: Primary | ICD-10-CM

## 2024-03-26 DIAGNOSIS — Z96.612 STATUS POST REVERSE TOTAL SHOULDER REPLACEMENT, LEFT: ICD-10-CM

## 2024-03-26 PROCEDURE — 99212 OFFICE O/P EST SF 10 MIN: CPT | Performed by: ORTHOPAEDIC SURGERY

## 2024-03-26 PROCEDURE — 1159F MED LIST DOCD IN RCRD: CPT | Performed by: ORTHOPAEDIC SURGERY

## 2024-03-26 PROCEDURE — 1160F RVW MEDS BY RX/DR IN RCRD: CPT | Performed by: ORTHOPAEDIC SURGERY

## 2024-03-26 NOTE — PROGRESS NOTES
[facility] Orthopaedic Surgery Office Follow Up       Office Follow Up Visit       Patient Name: Alin Lara    Chief Complaint:   Chief Complaint   Patient presents with    Follow-up     1 year f/u--  3.5 years s/p Total Shoulder Reverse Arthroplasty Left 10/19/20       Referring Physician: No ref. provider found    History of Present Illness:   It has been 1  year(s) since Alin Lara's last visit. Alin Lara returns to clinic today for F/U: follow-up of leftBody Part: shoulderReason: arthroplasty. The issue has been ongoing for 1 year(s). Alin Lara rates HIS/HER: hispain at 1/10 on the pain scale. Previous/current treatments: physical therapy. Current symptoms:Symptoms: same as prior visit. The pain is worse with sleeping and lying on affected side; resting and sitting improves the pain. Overall, he/she: heis doing better. I have reviewed the patient's history of present illness as noted/entered above.    I have reviewed the patient's past medical history, surgical history, social history, family history, medications, and allergies as noted in the electronic medical record and as noted/entered.  I have reviewed the patient's review of systems as noted/enter and updated as noted in the patient's HPI.      LEFT SHOULDER  7/1/2022:  Left shoulder he has had 2 separate periprosthetic fractures with 2 separate significant falls.  Counseled on interval healing and continue to protect his shoulder.     He said chronic lower extremity edema left more so than the right.  He has had multiple ultrasounds he has had chronic DVT changes.  He remains on anticoagulation for that.  We did have discussion about this today.  He is interested in physical therapy.  PT was ordered for the shoulder and perhaps they can assess the lower extremities as well given his gait, balance issues and recent falls.  He has seen vascular surgery/CT surgery and his primary  care team among others for the chronic lower extremity edema.  He notes he has been in discussion with his primary care team about this recently as well.  We will defer to his primary team and continue management over this over time as he is also had a prior total knee done on that same side at [facility].     [place] W[place] -progressing well he notes     He remains very active.     PRIOR:  10/19/2021:  Left shoulder has plateaued he feels somewhat worse she has been working on physical therapy with his lower extremity issues.   PT and notes and note with Dr. Bermudez reviewed.  He had his recent 2-year follow-up following his left total knee and he said lymphadenopathy and swelling of that lower extremity since the surgery on the knee.     Counseled on the left shoulder his radiographs appear stable compared to prior he is currently doing physical therapy for his lower extremity but not his shoulder.     Some shoulder soreness about 6 weeks ago.  Nowhere near the pain it was preop     Cardioversion after last visit, discussed with his heart team at that visit  Bout of elevated BP, ICU x 3 days -- no issues since        Left shoulder -- deltoid insertional pain     Latoya Mclain - knee PT     5/18/2021:  Improvements noted     Left shoulder 7 months status post reverse shoulder arthroplasty for severe B3 glenoid     Prior history:  Date of surgery 10/19/2020 left complex reverse shoulder replacement with augmented glenoid component for severe B3 glenoid     Home health physical therapy  4 months postop     Saw Dr. Zamora for left venous insufficiency     Enjoyed  PT with Adrienne and did very well, his is interested in Outpatient PT at [facility]           8/12/2022:  LEFT SHOULDER  Mr. Lara suffered 2 separate falls which resulted in a more proximal periprosthetic fracture that has effectively healed in a more distal fracture which is still in the healing process.  The second fracture more distal was  noted on radiographs 6/8/2022 after a second fall  He is now 2+ months out from that he is doing physical therapy with Jude Bradley is very pleased and making significant gains.     Overall remains quite stoic improved/better     9/29/2022:  LEFT SHOULDER  Shoulder doing great.  Excellent PT with Jude.  He notes his shoulder is as good as it ever been after surgery and he is very pleased.  He does want PT with Jude for his knees and to get back into see Dr. Macedo        3/30/2023:  Left shoulder status post reverse shoulder arthroplasty 10/19/2020  2 falls and 2 subsequent fractures that have healed. Last fracture after fall 6/8/2022 -- 9 months prior     Home PT/exercises with Jacob Pimentel        3/26/2024:  LEFT SHOULDER RSA 10/19/2020  3.5 years s/p LEFT RSA  Left shoulder doing well    LLE - s/p LEFT TKA at  with Dr. Bermudez; subsequent DVT, lymphedema -- sees Dr. Bermudez and Dr. Santamaria  Vascular for this.  I personally spoke to Dr. Santamaria regarding LLE lymphedema plan    He's excited about upcoming award at the [employer]      Subjective   Subjective      Review of Systems   Constitutional: Negative.  Negative for chills, fatigue and fever.   HENT: Negative.  Negative for congestion and dental problem.    Eyes: Negative.  Negative for blurred vision.   Respiratory: Negative.  Negative for shortness of breath.    Cardiovascular: Negative.  Negative for leg swelling.   Gastrointestinal: Negative.  Negative for abdominal pain.   Endocrine: Negative.  Negative for polyuria.   Genitourinary: Negative.  Negative for difficulty urinating.   Musculoskeletal:  Positive for arthralgias.   Skin: Negative.    Allergic/Immunologic: Negative.    Neurological: Negative.    Hematological: Negative.  Negative for adenopathy.   Psychiatric/Behavioral: Negative.  Negative for behavioral problems.         Past Medical History:   Past Medical History:   Diagnosis Date    Basal cell carcinoma     Blood loss     post op    ED  (erectile dysfunction)     Responsive to Viagra    Failed total knee, right     Fasting hypoglycemia 2016    Hemoglobin A1c normal    Generalized osteoarthritis     GERD (gastroesophageal reflux disease)     History esophageal stricture    History of blood clots     HNP (herniated nucleus pulposus), lumbar ,     L5 L6    Hyperglycemia     not diabetic, pt states hes never has high blood sugar per pt     Hyperlipidemia     Hypertension     Microscopic colitis 2017    Positive biopsy - colonoscopy    OAB (overactive bladder) 2000    Persistent urgency    Obesity Adulthood    2012 body weight 244 BMI 32    Painful total knee replacement     Paroxysmal atrial fibrillation     one episode - cardioverted    Prostatism     Pulmonary embolism 1991    after injury and protracted immobilization    Right knee DJD 2015    Partial knee arthroplasty    Shoulder dislocation     Repeated episodes    Sleep disturbances 1990    Frequently requires medication    Transient cerebral ischemia , 2016    Negative medical workup on both occasions    Venous stasis     Vertigo     Wears glasses        Past Surgical History:   Past Surgical History:   Procedure Laterality Date    CARDIOVERSION      Atrial fibrillation    COLONOSCOPY      2018    KNEE ARTHROPLASTY Right 2015    Partial     KNEE ARTHROPLASTY Left     KNEE ARTHROSCOPY Right 2015    Failed procedure    LUMBAR DISC SURGERY  2014    Surgery ×2 Ruptured disc    LUMBAR DISCECTOMY      L5 L6     SHOULDER SURGERY      TOTAL SHOULDER ARTHROPLASTY W/ DISTAL CLAVICLE EXCISION Left 10/19/2020    Procedure: TOTAL SHOULDER REVERSE ARTHROPLASTY LEFT;  Surgeon: Tan Miranda MD;  Location: [facility];  Service: Orthopedics;  Laterality: Left;       Family History:   Family History   Problem Relation Age of Onset    Dementia Mother          age 94    Other Father          age 86 of unknown cause    Rheum arthritis Father     Coronary artery disease  Brother     No Known Problems Brother     Sick sinus syndrome Other         Has pacemaker    Diabetes Paternal Grandmother        Social History:   Social History     Socioeconomic History    Marital status:     Number of children: 2   Tobacco Use    Smoking status: Never    Smokeless tobacco: Former     Types: Chew     Quit date: 1960   Vaping Use    Vaping status: Never Used   Substance and Sexual Activity    Alcohol use: Yes     Alcohol/week: 1.0 standard drink of alcohol     Types: 1 Cans of beer per week     Comment: Occasionally    Drug use: No    Sexual activity: Yes     Partners: Female     Comment: Monogamous       Medications:   Current Outpatient Medications:     ascorbic acid (VITAMIN C) 1000 MG tablet, Take 1 capsule by mouth Daily., Disp: , Rfl:     atenolol (TENORMIN) 50 MG tablet, TAKE 1 TABLET BY MOUTH DAILY., Disp: 90 tablet, Rfl: 1    atorvastatin (LIPITOR) 40 MG tablet, Take 1 tablet by mouth Daily. (Patient not taking: Reported on 3/30/2023), Disp: , Rfl:     CALCIUM-MAGNESIUM-ZINC PO, Take 1 tablet by mouth Daily., Disp: , Rfl:     Cholecalciferol (VITAMIN D-3) 25 MCG (1000 UT) capsule, Take  by mouth., Disp: , Rfl:     Coenzyme Q10 200 MG capsule, Take 200 mg by mouth Daily., Disp: 90 capsule, Rfl: 1    docusate sodium (Colace) 100 MG capsule, Take 1 capsule by mouth 2 (Two) Times a Day., Disp: 60 capsule, Rfl: 0    Eliquis 5 MG tablet tablet, Take 1 tablet by mouth Every 12 (Twelve) Hours., Disp: , Rfl:     Multiple Vitamins-Minerals (CENTRUM ADULTS) tablet, Take 1-2 tablets by mouth Daily., Disp: , Rfl:     oxybutynin XL (DITROPAN-XL) 5 MG 24 hr tablet, Take 1 tablet by mouth Every 12 (Twelve) Hours., Disp: 180 tablet, Rfl: 1    pantoprazole (PROTONIX) 40 MG EC tablet, TAKE 1 TABLET BY MOUTH EVERY DAY **DO NOT CRUSH, CHEW, OR SPLIT**, Disp: , Rfl:     sildenafil (VIAGRA) 100 MG tablet, Take 0.5-1 tablets by mouth as needed. (Patient not taking: Reported on 3/30/2023), Disp: , Rfl:      "tamsulosin (FLOMAX) 0.4 MG capsule 24 hr capsule, TAKE 1 CAPSULE EVERY 12 HOURS (Patient not taking: Reported on 3/30/2023), Disp: 180 capsule, Rfl: 1    Zinc 50 MG capsule, Take  by mouth., Disp: , Rfl:     Allergies:   Allergies   Allergen Reactions    Pravastatin Other (See Comments)     Nocturnal leg cramps    Atorvastatin      Fatigue    Qsymia [Phentermine-Topiramate] Mental Status Change    Topiramate Myalgia       The following portions of the patient's history were reviewed and updated as appropriate: allergies, current medications, past family history, past medical history, past social history, past surgical history and problem list.        Objective    Objective      Vital Signs:   Vitals:    03/26/24 0758   BP: 150/82   Weight: 129 kg (283 lb 6.4 oz)   Height: 182.9 cm (72\")       Ortho Exam:  LEFT SHOULDER  Doing well, pain free arc of motion  Good deltoid strength    Results Review:  Imaging Results (Last 24 Hours)       Procedure Component Value Units Date/Time    XR Shoulder 2+ View Left [[id number]] Resulted: 03/26/24 0906     Updated: 03/26/24 0906    Narrative:      Imaging: shoulder x-rays 3 views - AP, axillary, and scapular-Y x-ray   views    Side: LEFT SHOULDER    Indication for shoulder x-ray 3 views: shoulder pain    Comparison: serial postoperative imaging    Findings:   Left reverse shoulder arthroplasty shows stable findings compared to   prior.  No acute bony changes.  Shoulder is located and stable appearing.    Evidence of his prior nondisplaced periprosthetic fracture shows complete   healing which is stable compared to prior as well.      I personally reviewed the above x-rays.              Procedures            Assessment / Plan      Assessment/Plan:   Problem List Items Addressed This Visit          Musculoskeletal and Injuries    Status post reverse total shoulder replacement, left    History of total replacement of left shoulder joint - Primary    Relevant Orders    XR Shoulder 2+ " View Left (Completed)       LEFT RSA 3.5 years postop  He will continue with home exercise program  He continues to see [facility] team for his LLE and LEFT TKA    Follow Up: he will keep me updated      Tan Miranda MD, FAAOS  Orthopedic Surgeon  Fellowship Trained Shoulder and Elbow Surgeon  [facility]  Orthopedics and Sports Medicine  [address], [address]  [place] [address]    03/26/24  09:14 EDT

## 2025-05-29 ENCOUNTER — TRANSCRIBE ORDERS (OUTPATIENT)
Dept: PHYSICAL THERAPY | Facility: CLINIC | Age: 89
End: 2025-05-29
Payer: MEDICARE

## 2025-05-29 DIAGNOSIS — M17.10 UNILATERAL OSTEOARTHRITIS OF KNEE: Primary | ICD-10-CM

## 2025-07-01 ENCOUNTER — TREATMENT (OUTPATIENT)
Dept: PHYSICAL THERAPY | Facility: CLINIC | Age: 89
End: 2025-07-01
Payer: MEDICARE

## 2025-07-01 DIAGNOSIS — M25.561 CHRONIC PAIN OF BOTH KNEES: Primary | ICD-10-CM

## 2025-07-01 DIAGNOSIS — G89.29 CHRONIC PAIN OF BOTH KNEES: Primary | ICD-10-CM

## 2025-07-01 DIAGNOSIS — M25.562 CHRONIC PAIN OF BOTH KNEES: Primary | ICD-10-CM

## 2025-07-01 PROCEDURE — 97110 THERAPEUTIC EXERCISES: CPT | Performed by: PHYSICAL THERAPIST

## 2025-07-01 PROCEDURE — 97161 PT EVAL LOW COMPLEX 20 MIN: CPT | Performed by: PHYSICAL THERAPIST

## 2025-07-01 NOTE — PROGRESS NOTES
Physical Therapy Initial Evaluation and Plan of Care    [facility]    [address], [address] [place] [address]    Patient: Alin Lara   : 1935  Diagnosis/ICD-10 Code:  Chronic pain of both knees [M25.561, M25.562, G89.29]  Referring practitioner: Surya Loera, *  Date of Initial Visit: 2025  Today's Date: 2025  Patient seen for 1 session         Visit Diagnoses:    ICD-10-CM ICD-9-CM   1. Chronic pain of both knees  M25.561 719.46    M25.562 338.29    G89.29          Subjective Evaluation    History of Present Illness  Mechanism of injury: Pt has a history of knee issues and he had a partial knee replacement on the right about 4-5 years ago and a full TKA on the left knee about 2-3 years ago. At this point he feels like he has issues with pain in both knees, but he has more of an issue with the right knee. Pt walks without the use of a cane at home, but he feels that he needs to use the cane for safety when he is out of the house. He tries to get 7939-3871 steps per day. He usually needs to rest due to the pain in his knees after 3-4 minutes. Pt reports that     Has lost about 50# in the past several months. He got motivated to lose weight after his fall and a cardiac event and changed his diet and health habits and started wegovy.     Pt reports that he has fallen 2x in the house. The last fall was on 2024, and the fall before was a slip from a chair.       Patient Occupation: Retired Quality of life: good    Pain  Current pain ratin  At best pain ratin  At worst pain ratin  Location: [place] Knees. R>L  Quality: dull ache  Relieving factors: rest and change in position  Aggravating factors: ambulation  Progression: worsening (worsening pain on the right knee, no change recently with the left)    Social Support  Lives in: multiple-level home (does not have to use the stairs. Rails on the right side)  Lives with: spouse    Diagnostic Tests  No diagnostic  tests performed    Treatments  No previous or current treatments  Patient Goals  Patient goals for therapy: decreased pain, decreased edema, improved balance, increased motion, increased strength and independence with ADLs/IADLs                         -   Patient Active Problem List    Diagnosis Date Noted    History of total replacement of left shoulder joint 09/29/2022    Lymphedema of left lower extremity 09/29/2022    Chronic pain of right knee 09/29/2022    Fracture follow-up 08/12/2022    Closed fracture of left proximal humerus 08/12/2022    Leukocytosis, likely reactive 10/21/2020    Acute postoperative pain 10/21/2020    Status post reverse total shoulder replacement, left 10/19/2020    Primary localized osteoarthrosis of left shoulder region 08/18/2020    Contracture of joint of left shoulder region 08/18/2020    Biceps tendinitis of left upper extremity 08/18/2020    Left shoulder pain 11/21/2017    Esophageal stricture 06/08/2017    Lymphocytic colitis 04/11/2017    TIA (transient ischemic attack) 10/08/2016    Vertigo 10/07/2016    Generalized osteoarthritis 07/19/2016    Preventative health care 07/19/2016    Paroxysmal atrial fibrillation 05/02/2016    Basal cell carcinoma of skin 05/02/2016    Impotence of organic origin 05/02/2016    Gastroesophageal reflux disease 05/02/2016    Hyperlipidemia 05/02/2016    Hypertension 05/02/2016    Persistent insomnia 05/02/2016    Knee pain 05/02/2016    Osteoarthritis of lumbar spine 05/02/2016    Adiposity 05/02/2016    Overactive bladder 05/02/2016    Arthralgia of hip 05/02/2016    Impaired glucose tolerance 05/02/2016    Prostatism 05/02/2016    Pulmonary embolism 05/02/2016     Note Last Updated: 5/2/2016     Description: 1991 following a fall and immobilization      Inflammation of sacroiliac joint 05/02/2016       -   Past Medical History:   Diagnosis Date    Basal cell carcinoma     Blood loss     post op    ED (erectile dysfunction)     Responsive to  Viagra    Failed total knee, right     Fasting hypoglycemia 2016    Hemoglobin A1c normal    Generalized osteoarthritis     GERD (gastroesophageal reflux disease)     History esophageal stricture    History of blood clots     HNP (herniated nucleus pulposus), lumbar 1988, 2014    L5 L6    Hyperglycemia     not diabetic, pt states hes never has high blood sugar per pt     Hyperlipidemia     Hypertension     Microscopic colitis 2017    Positive biopsy - colonoscopy    OAB (overactive bladder) 2000    Persistent urgency    Obesity Adulthood    2012 body weight 244 BMI 32    Painful total knee replacement     Paroxysmal atrial fibrillation 1991    one episode - cardioverted    Prostatism 2014    Pulmonary embolism 1991    after injury and protracted immobilization    Right knee DJD 2015    Partial knee arthroplasty    Shoulder dislocation     Repeated episodes    Sleep disturbances 1990    Frequently requires medication    Transient cerebral ischemia 2010, 2016    Negative medical workup on both occasions    Venous stasis     Vertigo     Wears glasses        -   Past Surgical History:   Procedure Laterality Date    CARDIOVERSION  1991    Atrial fibrillation    COLONOSCOPY      2018    KNEE ARTHROPLASTY Right 2015    Partial     KNEE ARTHROPLASTY Left     KNEE ARTHROSCOPY Right 2015    Failed procedure    LUMBAR DISC SURGERY  2014    Surgery ×2 Ruptured disc    LUMBAR DISCECTOMY  1988    L5 L6     SHOULDER SURGERY      TOTAL SHOULDER ARTHROPLASTY W/ DISTAL CLAVICLE EXCISION Left 10/19/2020    Procedure: TOTAL SHOULDER REVERSE ARTHROPLASTY LEFT;  Surgeon: Tan Miranda MD;  [facility];  Service: Orthopedics;  Laterality: Left;        Objective          Palpation     Additional Palpation Details  TTP at the right lateral>medial joint line. TTP generally about the left knee.     Active Range of Motion   Left Knee   Flexion: 107 degrees   Extension: 10 degrees     Right Knee   Flexion: 120 degrees    Extension: 6 degrees     Strength/Myotome Testing     Left Hip   Planes of Motion   Flexion: 4  Extension: 4-  Abduction: 4-    Right Hip   Planes of Motion   Flexion: 4-  Extension: 3+  Abduction: 3+    Left Knee   Flexion: 4+  Extension: 4+    Right Knee   Flexion: 4+  Extension: 4    Ambulation     Ambulation: Level Surfaces     Additional Level Surfaces Ambulation Details  Pt ambulates with the use of a standard cane on the right side. Decreased step length and yani when walking in the clinic. Decreased knee flexion in sherie knees at late stance phase              Assessment & Plan       Assessment  Impairments: abnormal gait, abnormal muscle tone, abnormal or restricted ROM, activity intolerance, impaired balance, impaired physical strength, lacks appropriate home exercise program, pain with function and weight-bearing intolerance   Functional limitations: carrying objects, lifting, walking, uncomfortable because of pain, sitting and standing   Assessment details: Patient is a 89 year old male who comes to physical therapy with c/o pain in sherie knees. Signs and symptoms are consistent with sherie knee OA with history of surgical intervention  resulting in pain, decreased ROM, decreased strength, and inability to perform all essential functional activities. Pt will benefit from skilled PT services to address the above issues.     Prognosis details:   SHORT TERM GOALS:  2 weeks       1. Pt independent with HEP  2. Pt to demonstrate sherie hip strength 4/5 or greater to improve stability with ambulation  3. Pt to report being able to walk for 10 minutes without increasing pain in the bilateral knee    LONG TERM GOALS:   6 weeks  1. Pt to demonstrate ability to perform full functional squat to a chair with good form and control of the knees and without increasing pain  2. Pt to demonstrate sherie hip strength to 4+/5 or greater to improve safety with ambulation on uneven surfaces  3. Pt to demonstrate ability to perform  step up/down 8 inch step x10 safely and without pain in the bilateral knee       Plan  Therapy options: will be seen for skilled therapy services  Planned modality interventions: cryotherapy, electrical stimulation/Russian stimulation, high voltage pulsed current (pain management), iontophoresis, microcurrent electrical stimulation, TENS, thermotherapy (hydrocollator packs) and ultrasound  Planned therapy interventions: abdominal trunk stabilization, ADL retraining, balance/weight-bearing training, body mechanics training, flexibility, functional ROM exercises, gait training, home exercise program, IADL retraining, joint mobilization, manual therapy, motor coordination training, neuromuscular re-education, soft tissue mobilization, strengthening, stretching and therapeutic activities  Frequency: 2x week  Duration in weeks: 12  Treatment plan discussed with: patient        History # of Personal Factors and/or Comorbidities: MODERATE (1-2)  Examination of Body System(s): # of elements: LOW (1-2)  Clinical Presentation: STABLE   Clinical Decision Making: LOW     Access Code: [email or web address]  URL: [email or web address]  Date: 07/01/2025  Prepared by: Cuong Damon    Exercises  - Supine Quad Set  - 1 x daily - 7 x weekly - 2 sets - 10 reps - 5 sec hold  - Active Straight Leg Raise with Quad Set  - 1 x daily - 7 x weekly - 3 sets - 10 reps - 2 sec hold  - Hooklying Isometric Clamshell  - 1 x daily - 7 x weekly - 2 sets - 10 reps - 2 sec hold  - Hooklying Clamshell with Resistance  - 1 x daily - 7 x weekly - 3 sets - 10 reps - 3 sec hold      Timed:         Manual Therapy:         mins  34263;     Therapeutic Exercise:    16     mins  62431;     Neuromuscular Lisbeth:        mins  65997;    Therapeutic Activity:          mins  71622;     Gait Training:           mins  95194;     Ultrasound:          mins  24850;    Ionto                                   mins   90653  Self Care                            mins    92516  [facility]         mins 23219      Un-Timed:  Electrical Stimulation:         mins  03038 ( );  Dry Needling          mins self-pay  Traction          mins 53112  Low Eval     30     Mins  39793  Mod Eval          Mins  60516  High Eval                            Mins  15577        Timed Treatment:   16   mins   Total Treatment:     46   mins          PT: Cuong Damon PT, DPT, OCS, Cert. DN   License Number: [id number]  Electronically signed by Cuong Damon PT, 07/01/25, 8:58 AM EDT    Certification Period: 7/1/2025 thru 9/28/2025  I certify that the therapy services are furnished while this patient is under my care.  The services outlined above are required by this patient, and will be reviewed every 90 days.         Physician Signature:__________________________________________________    PHYSICIAN: Surya Loera MD  NPI: [id number]                                      DATE:      Please sign and return via fax to [email or web address] . Thank you, [facility].

## 2025-07-03 ENCOUNTER — TREATMENT (OUTPATIENT)
Dept: PHYSICAL THERAPY | Facility: CLINIC | Age: 89
End: 2025-07-03
Payer: MEDICARE

## 2025-07-03 DIAGNOSIS — G89.29 CHRONIC PAIN OF BOTH KNEES: Primary | ICD-10-CM

## 2025-07-03 DIAGNOSIS — M25.562 CHRONIC PAIN OF BOTH KNEES: Primary | ICD-10-CM

## 2025-07-03 DIAGNOSIS — M25.561 CHRONIC PAIN OF BOTH KNEES: Primary | ICD-10-CM

## 2025-07-03 PROCEDURE — 97112 NEUROMUSCULAR REEDUCATION: CPT | Performed by: PHYSICAL THERAPIST

## 2025-07-03 PROCEDURE — 97110 THERAPEUTIC EXERCISES: CPT | Performed by: PHYSICAL THERAPIST

## 2025-07-03 PROCEDURE — 97140 MANUAL THERAPY 1/> REGIONS: CPT | Performed by: PHYSICAL THERAPIST

## 2025-07-08 NOTE — PROGRESS NOTES
Physical Therapy Daily Treatment Note    [facility]   [address] [place] [address]      Patient: Alin Lara   : 1935  Referring practitioner: Surya Loera, *  Date of Initial Visit: Type: THERAPY  Noted: 2025  Today's Date: 7/3/2025  Patient seen for 2 sessions    Certification Period 7/3/2025 thru 10/5/2025       Visit Diagnoses:    ICD-10-CM ICD-9-CM   1. Chronic pain of both knees  M25.561 719.46    M25.562 338.29    G89.29        Subjective     Pt states that he feels like he is doing okay and he did not have an exacerbation of symptoms or excesssive fatigue with beginning his exercise program at home.     Objective   See Exercise, Manual, and Modality Logs for complete treatment.       Assessment/Plan     Initiated more exercise in the clinic today with a focus on improving hip stability and quality of quad contraction. Pt had moderate fatigue with exercise, but did not report having an increase in pain. Will cont as indicated      Alin Lara will continue to benefit from skilled physical therapy services to address deficits and continue to work towards reaching functional goals.           Timed:         Manual Therapy:    10     mins  98233;     Therapeutic Exercise:    14     mins  10318;     Neuromuscular Lisbeth:    15    mins  60947;    Therapeutic Activity:          mins  57482;     Gait Training:           mins  96296;     Ultrasound:          mins  47306;    Ionto                                   mins   67694  Self Care                            mins   89045  Canalith Repos         mins 87453  Electrical Stimulation:         mins  31060    Un-Timed:  Electrical Stimulation:         mins  94148 (MC );  Dry Needling          mins self-pay  Traction          mins 73376      Timed Treatment:   39   mins   Total Treatment:     39   mins    Cuong Damon, PT, DPT, Cert. DN  [place] License: [id number]

## 2025-07-15 ENCOUNTER — TREATMENT (OUTPATIENT)
Dept: PHYSICAL THERAPY | Facility: CLINIC | Age: 89
End: 2025-07-15
Payer: MEDICARE

## 2025-07-15 DIAGNOSIS — M25.561 CHRONIC PAIN OF BOTH KNEES: Primary | ICD-10-CM

## 2025-07-15 DIAGNOSIS — M25.562 CHRONIC PAIN OF BOTH KNEES: Primary | ICD-10-CM

## 2025-07-15 DIAGNOSIS — G89.29 CHRONIC PAIN OF BOTH KNEES: Primary | ICD-10-CM

## 2025-07-15 PROCEDURE — 97112 NEUROMUSCULAR REEDUCATION: CPT | Performed by: PHYSICAL THERAPIST

## 2025-07-15 PROCEDURE — 97140 MANUAL THERAPY 1/> REGIONS: CPT | Performed by: PHYSICAL THERAPIST

## 2025-07-15 PROCEDURE — 97110 THERAPEUTIC EXERCISES: CPT | Performed by: PHYSICAL THERAPIST

## 2025-07-18 ENCOUNTER — TREATMENT (OUTPATIENT)
Dept: PHYSICAL THERAPY | Facility: CLINIC | Age: 89
End: 2025-07-18
Payer: MEDICARE

## 2025-07-18 DIAGNOSIS — M25.562 CHRONIC PAIN OF BOTH KNEES: Primary | ICD-10-CM

## 2025-07-18 DIAGNOSIS — G89.29 CHRONIC PAIN OF BOTH KNEES: Primary | ICD-10-CM

## 2025-07-18 DIAGNOSIS — M25.561 CHRONIC PAIN OF BOTH KNEES: Primary | ICD-10-CM

## 2025-07-18 PROCEDURE — 97110 THERAPEUTIC EXERCISES: CPT | Performed by: PHYSICAL THERAPIST

## 2025-07-18 PROCEDURE — 97140 MANUAL THERAPY 1/> REGIONS: CPT | Performed by: PHYSICAL THERAPIST

## 2025-07-18 NOTE — PROGRESS NOTES
Physical Therapy Daily Treatment Note    [facility]   [address], [address] [place] [address]      Patient: Alin Lara   : 1935  Referring practitioner: Surya Loera, *  Date of Initial Visit: Type: THERAPY  Noted: 2025  Today's Date: 7/15/2025  Patient seen for 3 sessions    Certification Period 7/15/2025 thru 10/14/2025       Visit Diagnoses:    ICD-10-CM ICD-9-CM   1. Chronic pain of both knees  M25.561 719.46    M25.562 338.29    G89.29        Subjective     Patient states that he is feeling about the same.  He denies having any significant soreness or excessive fatigue after his previous therapy visits.    Objective   See Exercise, Manual, and Modality Logs for complete treatment.       Assessment/Plan     Continue with progression of activity in clinic today and increased resistance on several table activities.  Patient was able to perform some standing activity in the clinic as well without loss of balance and with overall good endurance.  Will continue to progress as indicated.    Alin Lara will continue to benefit from skilled physical therapy services to address deficits and continue to work towards reaching functional goals.           Timed:         Manual Therapy:    10     mins  55226;     Therapeutic Exercise:    30     mins  05748;     Neuromuscular Lisbeth:    15    mins  88034;    Therapeutic Activity:          mins  95229;     Gait Training:           mins  83256;     Ultrasound:          mins  47584;    Ionto                                   mins   83929  Self Care                            mins   77136  Canalith Repos         mins 54362  Electrical Stimulation:         mins  69779    Un-Timed:  Electrical Stimulation:         mins  62403 (MC );  Dry Needling          mins self-pay  Traction          mins 24215      Timed Treatment:   55   mins   Total Treatment:     55   mins    Cuong Damon, PT, DPT, Cert. DN  [place] License: [id number]

## 2025-07-18 NOTE — PROGRESS NOTES
Physical Therapy Daily Treatment Note    [facility]   [address], [address] [place] [address]      Patient: Alin Lara   : 1935  Referring practitioner: Surya Loera, *  Date of Initial Visit: Type: THERAPY  Noted: 2025  Today's Date: 2025  Patient seen for 4 sessions    Certification Period 2025 thru 10/15/2025       Visit Diagnoses:    ICD-10-CM ICD-9-CM   1. Chronic pain of both knees  M25.561 719.46    M25.562 338.29    G89.29        Subjective     Pt reports that he is feeling sore today and he had a significant increase in soreness in the LE's after his previous visit. He feels that the intensity may have been a little too much given his current level of fitness. He does not report any increase in knee pain with PT.    Objective   See Exercise, Manual, and Modality Logs for complete treatment.       Assessment/Plan     Decreased intensity of activity in the clinic today and focused on AROM and stretching activity to decrease muscle soreness. Pt responded well and did not have any exacerbation of pain with activity in the clinic. Will cont to progress as indicated.       Alin Lara will continue to benefit from skilled physical therapy services to address deficits and continue to work towards reaching functional goals.           Timed:         Manual Therapy:    25     mins  01614;     Therapeutic Exercise:    15     mins  63973;     Neuromuscular Lisbeth:        mins  73226;    Therapeutic Activity:          mins  18067;     Gait Training:           mins  99781;     Ultrasound:          mins  43868;    Ionto                                   mins   64328  Self Care                            mins   67381  Canalith Repos         mins 87070  Electrical Stimulation:         mins  65918    Un-Timed:  Electrical Stimulation:         mins  40017 ( );  Dry Needling          mins self-pay  Traction          mins 50084      Timed Treatment:   40   mins   Total  Treatment:     40   mins    Cuong Damon PT, DPT, Cert. DN  [place] License: [id number]

## 2025-07-22 ENCOUNTER — TREATMENT (OUTPATIENT)
Dept: PHYSICAL THERAPY | Facility: CLINIC | Age: 89
End: 2025-07-22
Payer: MEDICARE

## 2025-07-22 DIAGNOSIS — M25.562 CHRONIC PAIN OF BOTH KNEES: Primary | ICD-10-CM

## 2025-07-22 DIAGNOSIS — G89.29 CHRONIC PAIN OF BOTH KNEES: Primary | ICD-10-CM

## 2025-07-22 DIAGNOSIS — M25.561 CHRONIC PAIN OF BOTH KNEES: Primary | ICD-10-CM

## 2025-07-22 PROCEDURE — 97112 NEUROMUSCULAR REEDUCATION: CPT | Performed by: PHYSICAL THERAPIST

## 2025-07-22 PROCEDURE — 97110 THERAPEUTIC EXERCISES: CPT | Performed by: PHYSICAL THERAPIST

## 2025-07-22 NOTE — PROGRESS NOTES
Physical Therapy Daily Treatment Note    [facility]   [address] [place] [address]      Patient: Alin Lara   : 1935  Referring practitioner: Surya Loera, *  Date of Initial Visit: Type: THERAPY  Noted: 2025  Today's Date: 2025  Patient seen for 5 sessions    Certification Period 2025 thru 10/19/2025       Visit Diagnoses:    ICD-10-CM ICD-9-CM   1. Chronic pain of both knees  M25.561 719.46    M25.562 338.29    G89.29        Subjective     Pt states that he felt much better after his previous visit and he did not have any increase in soreness or excessive fatigue after the previous visit. He has ordered a tiger tail roller to help with muscle soreness.     Objective   See Exercise, Manual, and Modality Logs for complete treatment.       Assessment/Plan     Performed exercise in the clinic today with light resistance. Held most standing exercise to decrease the chance of significant increases in soreness in the LE's. Will assess his response at the next visit and progress as indicated.      Alin Lara will continue to benefit from skilled physical therapy services to address deficits and continue to work towards reaching functional goals.           Timed:         Manual Therapy:         mins  28744;     Therapeutic Exercise:    30     mins  47975;     Neuromuscular Lisbeth:    14    mins  01078;    Therapeutic Activity:          mins  78524;     Gait Training:           mins  32512;     Ultrasound:          mins  84398;    Ionto                                   mins   86653  Self Care                            mins   22161  Canalith Repos         mins 82333  Electrical Stimulation:         mins  44731    Un-Timed:  Electrical Stimulation:         mins  44769 ( );  Dry Needling          mins self-pay  Traction          mins 02991      Timed Treatment:   44   mins   Total Treatment:     44   mins    Cuong Damon, PT, DPT, Cert. DN  [place] License:  [id number]

## 2025-07-31 ENCOUNTER — TREATMENT (OUTPATIENT)
Dept: PHYSICAL THERAPY | Facility: CLINIC | Age: 89
End: 2025-07-31
Payer: MEDICARE

## 2025-07-31 DIAGNOSIS — G89.29 CHRONIC PAIN OF BOTH KNEES: Primary | ICD-10-CM

## 2025-07-31 DIAGNOSIS — M25.562 CHRONIC PAIN OF BOTH KNEES: Primary | ICD-10-CM

## 2025-07-31 DIAGNOSIS — M25.561 CHRONIC PAIN OF BOTH KNEES: Primary | ICD-10-CM

## 2025-07-31 PROCEDURE — 97112 NEUROMUSCULAR REEDUCATION: CPT | Performed by: PHYSICAL THERAPIST

## 2025-07-31 PROCEDURE — 97110 THERAPEUTIC EXERCISES: CPT | Performed by: PHYSICAL THERAPIST

## 2025-07-31 PROCEDURE — 97140 MANUAL THERAPY 1/> REGIONS: CPT | Performed by: PHYSICAL THERAPIST

## 2025-07-31 NOTE — PROGRESS NOTES
Physical Therapy Daily Treatment Note    [facility]   [address], [address] [place] [address]      Patient: Alin Lara   : 1935  Referring practitioner: Surya Loera, *  Date of Initial Visit: Type: THERAPY  Noted: 2025  Today's Date: 2025  Patient seen for 6 sessions    Certification Period 2025 thru 10/28/2025       Visit Diagnoses:    ICD-10-CM ICD-9-CM   1. Chronic pain of both knees  M25.561 719.46    M25.562 338.29    G89.29        Subjective     Pt states that he is feeling good overall and he denies having any excessive soreness in the LE's after his previous visit. He did note left shoulder pain after his previous session when he was leaning on support to perform exercise. Pt feels good with his progress so far and he expresses that he would like to get more comfortable with being able to get up from the floor after a fall.     Objective   See Exercise, Manual, and Modality Logs for complete treatment.       Assessment/Plan     Addressed pt's left shoulder stiffness today with STM to the left deltoid and upper trap and with gentle PROM. Pt reported having a decrease in intensity of pain in the shoulder, but he continued to have discomfort when trying to raise the arm actively.     Pt was able to perform LE exercise today without any significant pain or excessive fatigue. Will assess his response at the next visit and progress as indicated.       Alin Lara will continue to benefit from skilled physical therapy services to address deficits and continue to work towards reaching functional goals.           Timed:         Manual Therapy:    18     mins  73355;     Therapeutic Exercise:    10     mins  82620;     Neuromuscular Lisbeth:    10    mins  08471;    Therapeutic Activity:          mins  62023;     Gait Training:           mins  33868;     Ultrasound:          mins  18595;    Ionto                                   mins   95597  Self Care                             mins   51233  Canalith Repos         mins 10145  Electrical Stimulation:         mins  47938    Un-Timed:  Electrical Stimulation:         mins  24193 ( );  Dry Needling          mins self-pay  Traction          mins 99878      Timed Treatment:   38   mins   Total Treatment:     38   mins    Cuong Damon PT, DPT, Cert. DN  [place] License: [id number]

## 2025-08-04 ENCOUNTER — TREATMENT (OUTPATIENT)
Dept: PHYSICAL THERAPY | Facility: CLINIC | Age: 89
End: 2025-08-04
Payer: MEDICARE

## 2025-08-04 DIAGNOSIS — G89.29 CHRONIC PAIN OF BOTH KNEES: Primary | ICD-10-CM

## 2025-08-04 DIAGNOSIS — M25.561 CHRONIC PAIN OF BOTH KNEES: Primary | ICD-10-CM

## 2025-08-04 DIAGNOSIS — M25.562 CHRONIC PAIN OF BOTH KNEES: Primary | ICD-10-CM

## 2025-08-04 PROCEDURE — 97112 NEUROMUSCULAR REEDUCATION: CPT | Performed by: PHYSICAL THERAPIST

## 2025-08-04 PROCEDURE — 97110 THERAPEUTIC EXERCISES: CPT | Performed by: PHYSICAL THERAPIST

## 2025-08-04 PROCEDURE — 97140 MANUAL THERAPY 1/> REGIONS: CPT | Performed by: PHYSICAL THERAPIST

## 2025-08-07 ENCOUNTER — TREATMENT (OUTPATIENT)
Dept: PHYSICAL THERAPY | Facility: CLINIC | Age: 89
End: 2025-08-07
Payer: MEDICARE

## 2025-08-07 DIAGNOSIS — M25.562 CHRONIC PAIN OF BOTH KNEES: Primary | ICD-10-CM

## 2025-08-07 DIAGNOSIS — M25.561 CHRONIC PAIN OF BOTH KNEES: Primary | ICD-10-CM

## 2025-08-07 DIAGNOSIS — G89.29 CHRONIC PAIN OF BOTH KNEES: Primary | ICD-10-CM

## 2025-08-07 PROCEDURE — 97110 THERAPEUTIC EXERCISES: CPT | Performed by: PHYSICAL THERAPIST

## 2025-08-07 PROCEDURE — 97530 THERAPEUTIC ACTIVITIES: CPT | Performed by: PHYSICAL THERAPIST

## 2025-08-07 PROCEDURE — 97112 NEUROMUSCULAR REEDUCATION: CPT | Performed by: PHYSICAL THERAPIST

## 2025-08-12 ENCOUNTER — TREATMENT (OUTPATIENT)
Dept: PHYSICAL THERAPY | Facility: CLINIC | Age: 89
End: 2025-08-12
Payer: MEDICARE

## 2025-08-12 DIAGNOSIS — M25.562 CHRONIC PAIN OF BOTH KNEES: Primary | ICD-10-CM

## 2025-08-12 DIAGNOSIS — M25.561 CHRONIC PAIN OF BOTH KNEES: Primary | ICD-10-CM

## 2025-08-12 DIAGNOSIS — G89.29 CHRONIC PAIN OF BOTH KNEES: Primary | ICD-10-CM

## 2025-08-12 PROCEDURE — 97110 THERAPEUTIC EXERCISES: CPT | Performed by: PHYSICAL THERAPIST

## 2025-08-12 PROCEDURE — 97112 NEUROMUSCULAR REEDUCATION: CPT | Performed by: PHYSICAL THERAPIST

## 2025-08-12 PROCEDURE — 97140 MANUAL THERAPY 1/> REGIONS: CPT | Performed by: PHYSICAL THERAPIST

## 2025-08-15 ENCOUNTER — TREATMENT (OUTPATIENT)
Dept: PHYSICAL THERAPY | Facility: CLINIC | Age: 89
End: 2025-08-15
Payer: MEDICARE

## 2025-08-15 DIAGNOSIS — M25.561 CHRONIC PAIN OF BOTH KNEES: Primary | ICD-10-CM

## 2025-08-15 DIAGNOSIS — M25.562 CHRONIC PAIN OF BOTH KNEES: Primary | ICD-10-CM

## 2025-08-15 DIAGNOSIS — G89.29 CHRONIC PAIN OF BOTH KNEES: Primary | ICD-10-CM

## 2025-08-15 PROCEDURE — 97112 NEUROMUSCULAR REEDUCATION: CPT | Performed by: PHYSICAL THERAPIST

## 2025-08-19 ENCOUNTER — TREATMENT (OUTPATIENT)
Dept: PHYSICAL THERAPY | Facility: CLINIC | Age: 89
End: 2025-08-19
Payer: MEDICARE

## 2025-08-19 DIAGNOSIS — G89.29 CHRONIC PAIN OF BOTH KNEES: Primary | ICD-10-CM

## 2025-08-19 DIAGNOSIS — M25.561 CHRONIC PAIN OF BOTH KNEES: Primary | ICD-10-CM

## 2025-08-19 DIAGNOSIS — M25.562 CHRONIC PAIN OF BOTH KNEES: Primary | ICD-10-CM

## 2025-08-19 PROCEDURE — 97140 MANUAL THERAPY 1/> REGIONS: CPT | Performed by: PHYSICAL THERAPIST

## 2025-08-19 PROCEDURE — 97110 THERAPEUTIC EXERCISES: CPT | Performed by: PHYSICAL THERAPIST

## 2025-08-21 ENCOUNTER — TREATMENT (OUTPATIENT)
Dept: PHYSICAL THERAPY | Facility: CLINIC | Age: 89
End: 2025-08-21
Payer: MEDICARE

## 2025-08-21 DIAGNOSIS — M25.562 CHRONIC PAIN OF BOTH KNEES: Primary | ICD-10-CM

## 2025-08-21 DIAGNOSIS — G89.29 CHRONIC PAIN OF BOTH KNEES: Primary | ICD-10-CM

## 2025-08-21 DIAGNOSIS — M25.561 CHRONIC PAIN OF BOTH KNEES: Primary | ICD-10-CM

## 2025-08-21 PROCEDURE — 97112 NEUROMUSCULAR REEDUCATION: CPT | Performed by: PHYSICAL THERAPIST

## 2025-08-21 PROCEDURE — 97530 THERAPEUTIC ACTIVITIES: CPT | Performed by: PHYSICAL THERAPIST

## 2025-08-21 PROCEDURE — 97110 THERAPEUTIC EXERCISES: CPT | Performed by: PHYSICAL THERAPIST

## 2025-08-26 ENCOUNTER — TREATMENT (OUTPATIENT)
Dept: PHYSICAL THERAPY | Facility: CLINIC | Age: 89
End: 2025-08-26
Payer: MEDICARE

## 2025-08-26 DIAGNOSIS — G89.29 CHRONIC PAIN OF BOTH KNEES: Primary | ICD-10-CM

## 2025-08-26 DIAGNOSIS — M25.561 CHRONIC PAIN OF BOTH KNEES: Primary | ICD-10-CM

## 2025-08-26 DIAGNOSIS — M25.562 CHRONIC PAIN OF BOTH KNEES: Primary | ICD-10-CM

## 2025-08-26 PROCEDURE — 97140 MANUAL THERAPY 1/> REGIONS: CPT | Performed by: PHYSICAL THERAPIST

## 2025-08-26 PROCEDURE — 97112 NEUROMUSCULAR REEDUCATION: CPT | Performed by: PHYSICAL THERAPIST

## 2025-08-26 PROCEDURE — 97110 THERAPEUTIC EXERCISES: CPT | Performed by: PHYSICAL THERAPIST

## 2025-08-29 ENCOUNTER — TREATMENT (OUTPATIENT)
Dept: PHYSICAL THERAPY | Facility: CLINIC | Age: 89
End: 2025-08-29
Payer: MEDICARE

## 2025-08-29 DIAGNOSIS — M25.561 CHRONIC PAIN OF BOTH KNEES: Primary | ICD-10-CM

## 2025-08-29 DIAGNOSIS — G89.29 CHRONIC PAIN OF BOTH KNEES: Primary | ICD-10-CM

## 2025-08-29 DIAGNOSIS — M25.562 CHRONIC PAIN OF BOTH KNEES: Primary | ICD-10-CM

## (undated) DEVICE — PUMP PAIN AUTOFUSER AUTO SELCT NOBOLUS 1TO14ML/HR 550ML DISP

## (undated) DEVICE — ELECTRD BLD 1P SS STD 1IN

## (undated) DEVICE — HDRST POSTIN FM CRDL TRACH SLOT 9X8X4IN

## (undated) DEVICE — GLV SURG SENSICARE PI ORTHO SZ7.5 LF STRL

## (undated) DEVICE — MEDI-VAC YANKAUER SUCTION HANDLE: Brand: CARDINAL HEALTH

## (undated) DEVICE — 1010 S-DRAPE TOWEL DRAPE 10/BX: Brand: STERI-DRAPE™

## (undated) DEVICE — APPL CHLORAPREP W/TINT 26ML BLU

## (undated) DEVICE — UNDERGLV SURG BIOGEL INDICATOR LF PF 7.5

## (undated) DEVICE — GOWN,NON-REINFORCED,SIRUS,SET IN SLV,XL: Brand: MEDLINE

## (undated) DEVICE — CVR HNDL LIGHT RIGID

## (undated) DEVICE — SPNG GZ WOVN 4X4IN 12PLY 10/BX STRL

## (undated) DEVICE — SUT VICYL PLS CTD ANTIB BR 1 27IN VIL

## (undated) DEVICE — SUT ETHIB 1 CT1 30IN  X425H

## (undated) DEVICE — 3M™ STERI-DRAPE™ INSTRUMENT POUCH 1018: Brand: STERI-DRAPE™

## (undated) DEVICE — TB SXN FRAZIER 12F STRL

## (undated) DEVICE — DRAPE,TOP,102X53,STERILE: Brand: MEDLINE

## (undated) DEVICE — PK MAJ SHLDR SPLT 10

## (undated) DEVICE — DRSNG PAD ABD 8X10IN STRL

## (undated) DEVICE — 3M™ MEDIPORE™ H SOFT CLOTH SURGICAL TAPE, 2863, 3 IN X 10 YD, 12/CASE: Brand: 3M™ MEDIPORE™

## (undated) DEVICE — STRYKER PERFORMANCE SERIES SAGITTAL BLADE: Brand: STRYKER PERFORMANCE SERIES

## (undated) DEVICE — ANTIBACTERIAL UNDYED BRAIDED (POLYGLACTIN 910), SYNTHETIC ABSORBABLE SUTURE: Brand: COATED VICRYL